# Patient Record
Sex: FEMALE | Race: WHITE | Employment: OTHER | URBAN - METROPOLITAN AREA
[De-identification: names, ages, dates, MRNs, and addresses within clinical notes are randomized per-mention and may not be internally consistent; named-entity substitution may affect disease eponyms.]

---

## 2020-01-01 ENCOUNTER — APPOINTMENT (OUTPATIENT)
Dept: CT IMAGING | Age: 85
DRG: 064 | End: 2020-01-01
Payer: MEDICARE

## 2020-01-01 ENCOUNTER — INITIAL CONSULT (OUTPATIENT)
Dept: CARDIOLOGY CLINIC | Age: 85
End: 2020-01-01
Payer: MEDICARE

## 2020-01-01 ENCOUNTER — APPOINTMENT (OUTPATIENT)
Dept: MRI IMAGING | Age: 85
DRG: 064 | End: 2020-01-01
Payer: MEDICARE

## 2020-01-01 ENCOUNTER — TELEPHONE (OUTPATIENT)
Dept: CARDIOLOGY CLINIC | Age: 85
End: 2020-01-01

## 2020-01-01 ENCOUNTER — APPOINTMENT (OUTPATIENT)
Dept: GENERAL RADIOLOGY | Age: 85
DRG: 064 | End: 2020-01-01
Payer: MEDICARE

## 2020-01-01 ENCOUNTER — HOSPITAL ENCOUNTER (INPATIENT)
Age: 85
LOS: 1 days | Discharge: HOME | DRG: 064 | End: 2020-09-22
Attending: EMERGENCY MEDICINE | Admitting: INTERNAL MEDICINE
Payer: MEDICARE

## 2020-01-01 ENCOUNTER — HOSPITAL ENCOUNTER (INPATIENT)
Age: 85
LOS: 6 days | Discharge: HOME HEALTH | DRG: 064 | End: 2020-10-30
Attending: EMERGENCY MEDICINE | Admitting: INTERNAL MEDICINE
Payer: MEDICARE

## 2020-01-01 VITALS
BODY MASS INDEX: 27.29 KG/M2 | OXYGEN SATURATION: 98 % | HEIGHT: 60 IN | WEIGHT: 139 LBS | SYSTOLIC BLOOD PRESSURE: 120 MMHG | RESPIRATION RATE: 14 BRPM | TEMPERATURE: 97.1 F | DIASTOLIC BLOOD PRESSURE: 80 MMHG | HEART RATE: 81 BPM

## 2020-01-01 VITALS
TEMPERATURE: 95.2 F | BODY MASS INDEX: 29.35 KG/M2 | RESPIRATION RATE: 19 BRPM | OXYGEN SATURATION: 97 % | HEART RATE: 93 BPM | WEIGHT: 149.5 LBS | HEIGHT: 60 IN | SYSTOLIC BLOOD PRESSURE: 118 MMHG | DIASTOLIC BLOOD PRESSURE: 97 MMHG

## 2020-01-01 VITALS
TEMPERATURE: 98.2 F | DIASTOLIC BLOOD PRESSURE: 84 MMHG | HEIGHT: 60 IN | BODY MASS INDEX: 30.26 KG/M2 | WEIGHT: 154.13 LBS | HEART RATE: 77 BPM | RESPIRATION RATE: 23 BRPM | OXYGEN SATURATION: 96 % | SYSTOLIC BLOOD PRESSURE: 167 MMHG

## 2020-01-01 LAB
ALBUMIN SERPL-MCNC: 3.5 GM/DL (ref 3.4–5)
ALBUMIN SERPL-MCNC: 3.8 GM/DL (ref 3.4–5)
ALP BLD-CCNC: 54 IU/L (ref 40–129)
ALP BLD-CCNC: 65 IU/L (ref 40–129)
ALT SERPL-CCNC: 13 U/L (ref 10–40)
ALT SERPL-CCNC: 18 U/L (ref 10–40)
ANION GAP SERPL CALCULATED.3IONS-SCNC: 10 MMOL/L (ref 4–16)
ANION GAP SERPL CALCULATED.3IONS-SCNC: 11 MMOL/L (ref 4–16)
ANION GAP SERPL CALCULATED.3IONS-SCNC: 12 MMOL/L (ref 4–16)
ANION GAP SERPL CALCULATED.3IONS-SCNC: 13 MMOL/L (ref 4–16)
ANION GAP SERPL CALCULATED.3IONS-SCNC: 13 MMOL/L (ref 4–16)
ANION GAP SERPL CALCULATED.3IONS-SCNC: 14 MMOL/L (ref 4–16)
APTT: 103.1 SECONDS (ref 25.1–37.1)
APTT: 117.2 SECONDS (ref 25.1–37.1)
APTT: 150 SECONDS (ref 25.1–37.1)
APTT: 28.6 SECONDS (ref 25.1–37.1)
APTT: 31.5 SECONDS (ref 25.1–37.1)
APTT: 34 SECONDS (ref 25.1–37.1)
APTT: 46.1 SECONDS (ref 25.1–37.1)
APTT: 50.7 SECONDS (ref 25.1–37.1)
APTT: 59.6 SECONDS (ref 25.1–37.1)
APTT: 61.6 SECONDS (ref 25.1–37.1)
APTT: 66.2 SECONDS (ref 25.1–37.1)
APTT: 72.8 SECONDS (ref 25.1–37.1)
APTT: 84.2 SECONDS (ref 25.1–37.1)
APTT: 93.3 SECONDS (ref 25.1–37.1)
AST SERPL-CCNC: 20 IU/L (ref 15–37)
AST SERPL-CCNC: 28 IU/L (ref 15–37)
BACTERIA: ABNORMAL /HPF
BACTERIA: NEGATIVE /HPF
BASOPHILS ABSOLUTE: 0.1 K/CU MM
BASOPHILS ABSOLUTE: 0.1 K/CU MM
BASOPHILS RELATIVE PERCENT: 0.6 % (ref 0–1)
BASOPHILS RELATIVE PERCENT: 0.6 % (ref 0–1)
BILIRUB SERPL-MCNC: 0.3 MG/DL (ref 0–1)
BILIRUB SERPL-MCNC: 0.5 MG/DL (ref 0–1)
BILIRUBIN URINE: NEGATIVE MG/DL
BILIRUBIN URINE: NEGATIVE MG/DL
BLOOD, URINE: ABNORMAL
BLOOD, URINE: NEGATIVE
BUN BLDV-MCNC: 10 MG/DL (ref 6–23)
BUN BLDV-MCNC: 10 MG/DL (ref 6–23)
BUN BLDV-MCNC: 14 MG/DL (ref 6–23)
BUN BLDV-MCNC: 15 MG/DL (ref 6–23)
BUN BLDV-MCNC: 16 MG/DL (ref 6–23)
BUN BLDV-MCNC: 23 MG/DL (ref 6–23)
BUN BLDV-MCNC: 24 MG/DL (ref 6–23)
BUN BLDV-MCNC: 24 MG/DL (ref 6–23)
CALCIUM SERPL-MCNC: 10 MG/DL (ref 8.3–10.6)
CALCIUM SERPL-MCNC: 8.3 MG/DL (ref 8.3–10.6)
CALCIUM SERPL-MCNC: 8.6 MG/DL (ref 8.3–10.6)
CALCIUM SERPL-MCNC: 8.7 MG/DL (ref 8.3–10.6)
CALCIUM SERPL-MCNC: 8.7 MG/DL (ref 8.3–10.6)
CALCIUM SERPL-MCNC: 9.2 MG/DL (ref 8.3–10.6)
CALCIUM SERPL-MCNC: 9.5 MG/DL (ref 8.3–10.6)
CALCIUM SERPL-MCNC: 9.9 MG/DL (ref 8.3–10.6)
CHLORIDE BLD-SCNC: 102 MMOL/L (ref 99–110)
CHLORIDE BLD-SCNC: 89 MMOL/L (ref 99–110)
CHLORIDE BLD-SCNC: 92 MMOL/L (ref 99–110)
CHLORIDE BLD-SCNC: 93 MMOL/L (ref 99–110)
CHLORIDE BLD-SCNC: 95 MMOL/L (ref 99–110)
CHLORIDE BLD-SCNC: 98 MMOL/L (ref 99–110)
CHOLESTEROL: 166 MG/DL
CHOLESTEROL: 191 MG/DL
CLARITY: ABNORMAL
CLARITY: CLEAR
CO2: 19 MMOL/L (ref 21–32)
CO2: 20 MMOL/L (ref 21–32)
CO2: 21 MMOL/L (ref 21–32)
CO2: 22 MMOL/L (ref 21–32)
COLOR: ABNORMAL
COLOR: YELLOW
CREAT SERPL-MCNC: 0.7 MG/DL (ref 0.6–1.1)
CREAT SERPL-MCNC: 0.8 MG/DL (ref 0.6–1.1)
CREAT SERPL-MCNC: 0.8 MG/DL (ref 0.6–1.1)
CREAT SERPL-MCNC: 0.9 MG/DL (ref 0.6–1.1)
CREAT SERPL-MCNC: 0.9 MG/DL (ref 0.6–1.1)
CREATININE URINE: 61.2 MG/DL
CREATININE URINE: 61.4 MG/DL (ref 28–217)
CULTURE: NORMAL
DIFFERENTIAL TYPE: ABNORMAL
DIFFERENTIAL TYPE: ABNORMAL
EKG ATRIAL RATE: 119 BPM
EKG ATRIAL RATE: 129 BPM
EKG ATRIAL RATE: 252 BPM
EKG DIAGNOSIS: NORMAL
EKG P AXIS: 0 DEGREES
EKG Q-T INTERVAL: 328 MS
EKG Q-T INTERVAL: 396 MS
EKG Q-T INTERVAL: 400 MS
EKG QRS DURATION: 82 MS
EKG QRS DURATION: 82 MS
EKG QRS DURATION: 88 MS
EKG QTC CALCULATION (BAZETT): 433 MS
EKG QTC CALCULATION (BAZETT): 472 MS
EKG QTC CALCULATION (BAZETT): 500 MS
EKG R AXIS: -48 DEGREES
EKG R AXIS: -48 DEGREES
EKG R AXIS: -52 DEGREES
EKG T AXIS: -1 DEGREES
EKG T AXIS: -12 DEGREES
EKG T AXIS: -14 DEGREES
EKG VENTRICULAR RATE: 105 BPM
EKG VENTRICULAR RATE: 84 BPM
EKG VENTRICULAR RATE: 96 BPM
EOSINOPHILS ABSOLUTE: 0.2 K/CU MM
EOSINOPHILS ABSOLUTE: 0.2 K/CU MM
EOSINOPHILS RELATIVE PERCENT: 1.8 % (ref 0–3)
EOSINOPHILS RELATIVE PERCENT: 2.6 % (ref 0–3)
ESTIMATED AVERAGE GLUCOSE: 128 MG/DL
ESTIMATED AVERAGE GLUCOSE: 131 MG/DL
FOLATE: >20 NG/ML (ref 3.1–17.5)
GFR AFRICAN AMERICAN: >60 ML/MIN/1.73M2
GFR NON-AFRICAN AMERICAN: 59 ML/MIN/1.73M2
GFR NON-AFRICAN AMERICAN: 59 ML/MIN/1.73M2
GFR NON-AFRICAN AMERICAN: >60 ML/MIN/1.73M2
GLUCOSE BLD-MCNC: 103 MG/DL (ref 70–99)
GLUCOSE BLD-MCNC: 107 MG/DL (ref 70–99)
GLUCOSE BLD-MCNC: 110 MG/DL (ref 70–99)
GLUCOSE BLD-MCNC: 112 MG/DL (ref 70–99)
GLUCOSE BLD-MCNC: 114 MG/DL (ref 70–99)
GLUCOSE BLD-MCNC: 115 MG/DL (ref 70–99)
GLUCOSE BLD-MCNC: 118 MG/DL
GLUCOSE BLD-MCNC: 118 MG/DL (ref 70–99)
GLUCOSE BLD-MCNC: 119 MG/DL
GLUCOSE BLD-MCNC: 119 MG/DL (ref 70–99)
GLUCOSE BLD-MCNC: 120 MG/DL (ref 70–99)
GLUCOSE BLD-MCNC: 92 MG/DL (ref 70–99)
GLUCOSE, URINE: NEGATIVE MG/DL
GLUCOSE, URINE: NEGATIVE MG/DL
HBA1C MFR BLD: 6.1 % (ref 4.2–6.3)
HBA1C MFR BLD: 6.2 % (ref 4.2–6.3)
HCT VFR BLD CALC: 36.6 % (ref 37–47)
HCT VFR BLD CALC: 36.7 % (ref 37–47)
HCT VFR BLD CALC: 36.9 % (ref 37–47)
HCT VFR BLD CALC: 37.5 % (ref 37–47)
HCT VFR BLD CALC: 43.4 % (ref 37–47)
HCT VFR BLD CALC: 43.8 % (ref 37–47)
HDLC SERPL-MCNC: 53 MG/DL
HDLC SERPL-MCNC: 71 MG/DL
HEMOGLOBIN: 12.2 GM/DL (ref 12.5–16)
HEMOGLOBIN: 12.6 GM/DL (ref 12.5–16)
HEMOGLOBIN: 12.9 GM/DL (ref 12.5–16)
HEMOGLOBIN: 13 GM/DL (ref 12.5–16)
HEMOGLOBIN: 14.7 GM/DL (ref 12.5–16)
HEMOGLOBIN: 14.9 GM/DL (ref 12.5–16)
HOMOCYSTEINE: 11.3 UMOL/L (ref 0–10)
HOMOCYSTEINE: 9 UMOL/L (ref 0–10)
IMMATURE NEUTROPHIL %: 0.2 % (ref 0–0.43)
IMMATURE NEUTROPHIL %: 0.2 % (ref 0–0.43)
INR BLD: 1.01 INDEX
INR BLD: 1.03 INDEX
INR BLD: 1.14 INDEX
INR BLD: 1.18 INDEX
INR BLD: 1.19 INDEX
INR BLD: 1.25 INDEX
INR BLD: 1.29 INDEX
KETONES, URINE: ABNORMAL MG/DL
KETONES, URINE: NEGATIVE MG/DL
LDL CHOLESTEROL DIRECT: 103 MG/DL
LDL CHOLESTEROL DIRECT: 108 MG/DL
LEUKOCYTE ESTERASE, URINE: ABNORMAL
LEUKOCYTE ESTERASE, URINE: NEGATIVE
LV EF: 53 %
LVEF MODALITY: NORMAL
LYMPHOCYTES ABSOLUTE: 1.2 K/CU MM
LYMPHOCYTES ABSOLUTE: 1.3 K/CU MM
LYMPHOCYTES RELATIVE PERCENT: 14 % (ref 24–44)
LYMPHOCYTES RELATIVE PERCENT: 14.9 % (ref 24–44)
Lab: NORMAL
MAGNESIUM: 1.9 MG/DL (ref 1.8–2.4)
MCH RBC QN AUTO: 29.5 PG (ref 27–31)
MCH RBC QN AUTO: 30.3 PG (ref 27–31)
MCH RBC QN AUTO: 30.7 PG (ref 27–31)
MCH RBC QN AUTO: 31 PG (ref 27–31)
MCH RBC QN AUTO: 31.1 PG (ref 27–31)
MCH RBC QN AUTO: 31.4 PG (ref 27–31)
MCHC RBC AUTO-ENTMCNC: 33.2 % (ref 32–36)
MCHC RBC AUTO-ENTMCNC: 33.9 % (ref 32–36)
MCHC RBC AUTO-ENTMCNC: 34 % (ref 32–36)
MCHC RBC AUTO-ENTMCNC: 34.4 % (ref 32–36)
MCHC RBC AUTO-ENTMCNC: 34.7 % (ref 32–36)
MCHC RBC AUTO-ENTMCNC: 35 % (ref 32–36)
MCV RBC AUTO: 88.6 FL (ref 78–100)
MCV RBC AUTO: 88.7 FL (ref 78–100)
MCV RBC AUTO: 89.2 FL (ref 78–100)
MCV RBC AUTO: 89.7 FL (ref 78–100)
MCV RBC AUTO: 90.6 FL (ref 78–100)
MCV RBC AUTO: 91.3 FL (ref 78–100)
MONOCYTES ABSOLUTE: 0.7 K/CU MM
MONOCYTES ABSOLUTE: 0.9 K/CU MM
MONOCYTES RELATIVE PERCENT: 10.1 % (ref 0–4)
MONOCYTES RELATIVE PERCENT: 8.3 % (ref 0–4)
MUCUS: ABNORMAL HPF
NITRITE URINE, QUANTITATIVE: NEGATIVE
NITRITE URINE, QUANTITATIVE: NEGATIVE
NUCLEATED RBC %: 0 %
NUCLEATED RBC %: 0 %
OSMOLALITY URINE: 455 MOS/L (ref 292–1090)
PDW BLD-RTO: 13 % (ref 11.7–14.9)
PDW BLD-RTO: 13.1 % (ref 11.7–14.9)
PDW BLD-RTO: 13.2 % (ref 11.7–14.9)
PDW BLD-RTO: 13.3 % (ref 11.7–14.9)
PDW BLD-RTO: 13.3 % (ref 11.7–14.9)
PDW BLD-RTO: 13.4 % (ref 11.7–14.9)
PH, URINE: 6 (ref 5–8)
PH, URINE: 7 (ref 5–8)
PHOSPHORUS: 3.1 MG/DL (ref 2.5–4.9)
PLATELET # BLD: 279 K/CU MM (ref 140–440)
PLATELET # BLD: 287 K/CU MM (ref 140–440)
PLATELET # BLD: 290 K/CU MM (ref 140–440)
PLATELET # BLD: 312 K/CU MM (ref 140–440)
PLATELET # BLD: 321 K/CU MM (ref 140–440)
PLATELET # BLD: 345 K/CU MM (ref 140–440)
PMV BLD AUTO: 10.1 FL (ref 7.5–11.1)
PMV BLD AUTO: 10.6 FL (ref 7.5–11.1)
PMV BLD AUTO: 9.3 FL (ref 7.5–11.1)
PMV BLD AUTO: 9.4 FL (ref 7.5–11.1)
PMV BLD AUTO: 9.7 FL (ref 7.5–11.1)
PMV BLD AUTO: 9.8 FL (ref 7.5–11.1)
POTASSIUM SERPL-SCNC: 3.7 MMOL/L (ref 3.5–5.1)
POTASSIUM SERPL-SCNC: 3.9 MMOL/L (ref 3.5–5.1)
POTASSIUM SERPL-SCNC: 4.2 MMOL/L (ref 3.5–5.1)
POTASSIUM SERPL-SCNC: 4.3 MMOL/L (ref 3.5–5.1)
POTASSIUM SERPL-SCNC: 4.4 MMOL/L (ref 3.5–5.1)
POTASSIUM SERPL-SCNC: 4.4 MMOL/L (ref 3.5–5.1)
POTASSIUM SERPL-SCNC: 4.5 MMOL/L (ref 3.5–5.1)
POTASSIUM SERPL-SCNC: 4.7 MMOL/L (ref 3.5–5.1)
PROT/CREAT RATIO, UR: 0.2
PROTEIN UA: 100 MG/DL
PROTEIN UA: NEGATIVE MG/DL
PROTHROMBIN TIME: 12.2 SECONDS (ref 11.7–14.5)
PROTHROMBIN TIME: 12.5 SECONDS (ref 11.7–14.5)
PROTHROMBIN TIME: 13.8 SECONDS (ref 11.7–14.5)
PROTHROMBIN TIME: 14.3 SECONDS (ref 11.7–14.5)
PROTHROMBIN TIME: 14.4 SECONDS (ref 11.7–14.5)
PROTHROMBIN TIME: 15.1 SECONDS (ref 11.7–14.5)
PROTHROMBIN TIME: 15.7 SECONDS (ref 11.7–14.5)
RBC # BLD: 4.01 M/CU MM (ref 4.2–5.4)
RBC # BLD: 4.14 M/CU MM (ref 4.2–5.4)
RBC # BLD: 4.16 M/CU MM (ref 4.2–5.4)
RBC # BLD: 4.18 M/CU MM (ref 4.2–5.4)
RBC # BLD: 4.79 M/CU MM (ref 4.2–5.4)
RBC # BLD: 4.91 M/CU MM (ref 4.2–5.4)
RBC URINE: 1 /HPF (ref 0–6)
RBC URINE: 17 /HPF (ref 0–6)
REASON FOR REJECTION: NORMAL
REJECTED TEST: NORMAL
SEGMENTED NEUTROPHILS ABSOLUTE COUNT: 6 K/CU MM
SEGMENTED NEUTROPHILS ABSOLUTE COUNT: 6.4 K/CU MM
SEGMENTED NEUTROPHILS RELATIVE PERCENT: 71.6 % (ref 36–66)
SEGMENTED NEUTROPHILS RELATIVE PERCENT: 75.1 % (ref 36–66)
SODIUM BLD-SCNC: 122 MMOL/L (ref 135–145)
SODIUM BLD-SCNC: 127 MMOL/L (ref 135–145)
SODIUM BLD-SCNC: 128 MMOL/L (ref 135–145)
SODIUM BLD-SCNC: 128 MMOL/L (ref 135–145)
SODIUM BLD-SCNC: 130 MMOL/L (ref 135–145)
SODIUM BLD-SCNC: 133 MMOL/L (ref 135–145)
SODIUM URINE: 63 MMOL/L (ref 35–167)
SPECIFIC GRAVITY UA: 1.03 (ref 1–1.03)
SPECIFIC GRAVITY UA: 1.03 (ref 1–1.03)
SPECIMEN: NORMAL
SQUAMOUS EPITHELIAL: <1 /HPF
SQUAMOUS EPITHELIAL: <1 /HPF
TOTAL IMMATURE NEUTOROPHIL: 0.02 K/CU MM
TOTAL IMMATURE NEUTOROPHIL: 0.02 K/CU MM
TOTAL NUCLEATED RBC: 0 K/CU MM
TOTAL NUCLEATED RBC: 0 K/CU MM
TOTAL PROTEIN: 6.7 GM/DL (ref 6.4–8.2)
TOTAL PROTEIN: 7.5 GM/DL (ref 6.4–8.2)
TOTAL RETICULOCYTE COUNT: 0.07 K/CU MM
TRICHOMONAS: ABNORMAL /HPF
TRICHOMONAS: ABNORMAL /HPF
TRIGL SERPL-MCNC: 62 MG/DL
TRIGL SERPL-MCNC: 78 MG/DL
TROPONIN T: 0.01 NG/ML
TROPONIN T: 0.03 NG/ML
TROPONIN T: 0.04 NG/ML
TROPONIN T: <0.01 NG/ML
TSH HIGH SENSITIVITY: 1.26 UIU/ML (ref 0.27–4.2)
URINE TOTAL PROTEIN: 11.3 MG/DL
UROBILINOGEN, URINE: NORMAL MG/DL (ref 0.2–1)
UROBILINOGEN, URINE: NORMAL MG/DL (ref 0.2–1)
VITAMIN B-12: 1282 PG/ML (ref 211–911)
WBC # BLD: 6.5 K/CU MM (ref 4–10.5)
WBC # BLD: 6.9 K/CU MM (ref 4–10.5)
WBC # BLD: 7 K/CU MM (ref 4–10.5)
WBC # BLD: 7.2 K/CU MM (ref 4–10.5)
WBC # BLD: 8.4 K/CU MM (ref 4–10.5)
WBC # BLD: 8.5 K/CU MM (ref 4–10.5)
WBC UA: 5 /HPF (ref 0–5)
WBC UA: ABNORMAL /HPF (ref 0–5)

## 2020-01-01 PROCEDURE — 94761 N-INVAS EAR/PLS OXIMETRY MLT: CPT

## 2020-01-01 PROCEDURE — 93000 ELECTROCARDIOGRAM COMPLETE: CPT | Performed by: INTERNAL MEDICINE

## 2020-01-01 PROCEDURE — 93010 ELECTROCARDIOGRAM REPORT: CPT | Performed by: INTERNAL MEDICINE

## 2020-01-01 PROCEDURE — 85027 COMPLETE CBC AUTOMATED: CPT

## 2020-01-01 PROCEDURE — 6370000000 HC RX 637 (ALT 250 FOR IP): Performed by: PSYCHIATRY & NEUROLOGY

## 2020-01-01 PROCEDURE — 83090 ASSAY OF HOMOCYSTEINE: CPT

## 2020-01-01 PROCEDURE — 2580000003 HC RX 258: Performed by: NURSE PRACTITIONER

## 2020-01-01 PROCEDURE — 97116 GAIT TRAINING THERAPY: CPT

## 2020-01-01 PROCEDURE — 99232 SBSQ HOSP IP/OBS MODERATE 35: CPT | Performed by: INTERNAL MEDICINE

## 2020-01-01 PROCEDURE — 85730 THROMBOPLASTIN TIME PARTIAL: CPT

## 2020-01-01 PROCEDURE — 6360000002 HC RX W HCPCS: Performed by: NURSE PRACTITIONER

## 2020-01-01 PROCEDURE — 6370000000 HC RX 637 (ALT 250 FOR IP): Performed by: NURSE PRACTITIONER

## 2020-01-01 PROCEDURE — 82962 GLUCOSE BLOOD TEST: CPT

## 2020-01-01 PROCEDURE — 6370000000 HC RX 637 (ALT 250 FOR IP): Performed by: INTERNAL MEDICINE

## 2020-01-01 PROCEDURE — 36415 COLL VENOUS BLD VENIPUNCTURE: CPT

## 2020-01-01 PROCEDURE — 4040F PNEUMOC VAC/ADMIN/RCVD: CPT | Performed by: INTERNAL MEDICINE

## 2020-01-01 PROCEDURE — 96374 THER/PROPH/DIAG INJ IV PUSH: CPT

## 2020-01-01 PROCEDURE — 85610 PROTHROMBIN TIME: CPT

## 2020-01-01 PROCEDURE — 96372 THER/PROPH/DIAG INJ SC/IM: CPT

## 2020-01-01 PROCEDURE — 83721 ASSAY OF BLOOD LIPOPROTEIN: CPT

## 2020-01-01 PROCEDURE — 84156 ASSAY OF PROTEIN URINE: CPT

## 2020-01-01 PROCEDURE — G0378 HOSPITAL OBSERVATION PER HR: HCPCS

## 2020-01-01 PROCEDURE — 1036F TOBACCO NON-USER: CPT | Performed by: INTERNAL MEDICINE

## 2020-01-01 PROCEDURE — 6360000002 HC RX W HCPCS: Performed by: INTERNAL MEDICINE

## 2020-01-01 PROCEDURE — 2700000000 HC OXYGEN THERAPY PER DAY

## 2020-01-01 PROCEDURE — 97162 PT EVAL MOD COMPLEX 30 MIN: CPT

## 2020-01-01 PROCEDURE — 97110 THERAPEUTIC EXERCISES: CPT

## 2020-01-01 PROCEDURE — 6360000002 HC RX W HCPCS: Performed by: PSYCHIATRY & NEUROLOGY

## 2020-01-01 PROCEDURE — 97530 THERAPEUTIC ACTIVITIES: CPT

## 2020-01-01 PROCEDURE — 70450 CT HEAD/BRAIN W/O DYE: CPT

## 2020-01-01 PROCEDURE — 80061 LIPID PANEL: CPT

## 2020-01-01 PROCEDURE — 2140000000 HC CCU INTERMEDIATE R&B

## 2020-01-01 PROCEDURE — 70496 CT ANGIOGRAPHY HEAD: CPT

## 2020-01-01 PROCEDURE — G8417 CALC BMI ABV UP PARAM F/U: HCPCS | Performed by: INTERNAL MEDICINE

## 2020-01-01 PROCEDURE — 82607 VITAMIN B-12: CPT

## 2020-01-01 PROCEDURE — 84100 ASSAY OF PHOSPHORUS: CPT

## 2020-01-01 PROCEDURE — 97166 OT EVAL MOD COMPLEX 45 MIN: CPT

## 2020-01-01 PROCEDURE — 83935 ASSAY OF URINE OSMOLALITY: CPT

## 2020-01-01 PROCEDURE — 6370000000 HC RX 637 (ALT 250 FOR IP): Performed by: HOSPITALIST

## 2020-01-01 PROCEDURE — 71045 X-RAY EXAM CHEST 1 VIEW: CPT

## 2020-01-01 PROCEDURE — 2000000000 HC ICU R&B

## 2020-01-01 PROCEDURE — 99205 OFFICE O/P NEW HI 60 MIN: CPT | Performed by: INTERNAL MEDICINE

## 2020-01-01 PROCEDURE — 83735 ASSAY OF MAGNESIUM: CPT

## 2020-01-01 PROCEDURE — 84484 ASSAY OF TROPONIN QUANT: CPT

## 2020-01-01 PROCEDURE — 82746 ASSAY OF FOLIC ACID SERUM: CPT

## 2020-01-01 PROCEDURE — 83036 HEMOGLOBIN GLYCOSYLATED A1C: CPT

## 2020-01-01 PROCEDURE — 6360000004 HC RX CONTRAST MEDICATION: Performed by: EMERGENCY MEDICINE

## 2020-01-01 PROCEDURE — 1123F ACP DISCUSS/DSCN MKR DOCD: CPT | Performed by: INTERNAL MEDICINE

## 2020-01-01 PROCEDURE — 85025 COMPLETE CBC W/AUTO DIFF WBC: CPT

## 2020-01-01 PROCEDURE — 80048 BASIC METABOLIC PNL TOTAL CA: CPT

## 2020-01-01 PROCEDURE — 82570 ASSAY OF URINE CREATININE: CPT

## 2020-01-01 PROCEDURE — 80053 COMPREHEN METABOLIC PANEL: CPT

## 2020-01-01 PROCEDURE — 84300 ASSAY OF URINE SODIUM: CPT

## 2020-01-01 PROCEDURE — 2500000003 HC RX 250 WO HCPCS: Performed by: INTERNAL MEDICINE

## 2020-01-01 PROCEDURE — 99285 EMERGENCY DEPT VISIT HI MDM: CPT

## 2020-01-01 PROCEDURE — 70551 MRI BRAIN STEM W/O DYE: CPT

## 2020-01-01 PROCEDURE — 70547 MR ANGIOGRAPHY NECK W/O DYE: CPT

## 2020-01-01 PROCEDURE — 1200000000 HC SEMI PRIVATE

## 2020-01-01 PROCEDURE — 93005 ELECTROCARDIOGRAM TRACING: CPT | Performed by: EMERGENCY MEDICINE

## 2020-01-01 PROCEDURE — 99291 CRITICAL CARE FIRST HOUR: CPT | Performed by: INTERNAL MEDICINE

## 2020-01-01 PROCEDURE — 92610 EVALUATE SWALLOWING FUNCTION: CPT | Performed by: SPEECH-LANGUAGE PATHOLOGIST

## 2020-01-01 PROCEDURE — 6360000004 HC RX CONTRAST MEDICATION: Performed by: PHYSICIAN ASSISTANT

## 2020-01-01 PROCEDURE — 6360000002 HC RX W HCPCS: Performed by: PHYSICIAN ASSISTANT

## 2020-01-01 PROCEDURE — G8484 FLU IMMUNIZE NO ADMIN: HCPCS | Performed by: INTERNAL MEDICINE

## 2020-01-01 PROCEDURE — 87086 URINE CULTURE/COLONY COUNT: CPT

## 2020-01-01 PROCEDURE — 2580000003 HC RX 258: Performed by: PHYSICIAN ASSISTANT

## 2020-01-01 PROCEDURE — 93005 ELECTROCARDIOGRAM TRACING: CPT | Performed by: PHYSICIAN ASSISTANT

## 2020-01-01 PROCEDURE — 96375 TX/PRO/DX INJ NEW DRUG ADDON: CPT

## 2020-01-01 PROCEDURE — 81001 URINALYSIS AUTO W/SCOPE: CPT

## 2020-01-01 PROCEDURE — 51798 US URINE CAPACITY MEASURE: CPT

## 2020-01-01 PROCEDURE — 97535 SELF CARE MNGMENT TRAINING: CPT

## 2020-01-01 PROCEDURE — G8427 DOCREV CUR MEDS BY ELIG CLIN: HCPCS | Performed by: INTERNAL MEDICINE

## 2020-01-01 PROCEDURE — 93306 TTE W/DOPPLER COMPLETE: CPT

## 2020-01-01 PROCEDURE — 93005 ELECTROCARDIOGRAM TRACING: CPT | Performed by: NURSE PRACTITIONER

## 2020-01-01 PROCEDURE — 1090F PRES/ABSN URINE INCON ASSESS: CPT | Performed by: INTERNAL MEDICINE

## 2020-01-01 PROCEDURE — APPSS30 APP SPLIT SHARED TIME 16-30 MINUTES: Performed by: NURSE PRACTITIONER

## 2020-01-01 PROCEDURE — 84443 ASSAY THYROID STIM HORMONE: CPT

## 2020-01-01 PROCEDURE — 70498 CT ANGIOGRAPHY NECK: CPT

## 2020-01-01 RX ORDER — CLOPIDOGREL BISULFATE 75 MG/1
75 TABLET ORAL DAILY
Status: DISCONTINUED | OUTPATIENT
Start: 2020-01-01 | End: 2020-01-01 | Stop reason: HOSPADM

## 2020-01-01 RX ORDER — LEVOTHYROXINE SODIUM 112 UG/1
112 TABLET ORAL DAILY
Status: DISCONTINUED | OUTPATIENT
Start: 2020-01-01 | End: 2020-01-01 | Stop reason: HOSPADM

## 2020-01-01 RX ORDER — ASPIRIN 81 MG/1
81 TABLET, CHEWABLE ORAL DAILY
Status: DISCONTINUED | OUTPATIENT
Start: 2020-01-01 | End: 2020-01-01 | Stop reason: HOSPADM

## 2020-01-01 RX ORDER — ASPIRIN 300 MG/1
300 SUPPOSITORY RECTAL DAILY
Status: DISCONTINUED | OUTPATIENT
Start: 2020-01-01 | End: 2020-01-01

## 2020-01-01 RX ORDER — ASPIRIN 81 MG/1
81 TABLET ORAL DAILY
Qty: 30 TABLET | Refills: 2 | Status: SHIPPED | OUTPATIENT
Start: 2020-01-01 | End: 2020-01-01

## 2020-01-01 RX ORDER — AMLODIPINE BESYLATE 2.5 MG/1
2.5 TABLET ORAL DAILY
Status: DISCONTINUED | OUTPATIENT
Start: 2020-01-01 | End: 2020-01-01

## 2020-01-01 RX ORDER — DEXTROSE MONOHYDRATE 25 G/50ML
12.5 INJECTION, SOLUTION INTRAVENOUS
Status: DISCONTINUED | OUTPATIENT
Start: 2020-01-01 | End: 2020-01-01

## 2020-01-01 RX ORDER — WARFARIN SODIUM 5 MG/1
5 TABLET ORAL DAILY
Status: DISCONTINUED | OUTPATIENT
Start: 2020-01-01 | End: 2020-01-01 | Stop reason: HOSPADM

## 2020-01-01 RX ORDER — CLOPIDOGREL BISULFATE 75 MG/1
75 TABLET ORAL DAILY
Qty: 30 TABLET | Refills: 3 | Status: SHIPPED | OUTPATIENT
Start: 2020-01-01 | End: 2020-01-01 | Stop reason: ALTCHOICE

## 2020-01-01 RX ORDER — ATORVASTATIN CALCIUM 80 MG/1
80 TABLET, FILM COATED ORAL NIGHTLY
Status: DISCONTINUED | OUTPATIENT
Start: 2020-01-01 | End: 2020-01-01 | Stop reason: HOSPADM

## 2020-01-01 RX ORDER — 0.9 % SODIUM CHLORIDE 0.9 %
50 INTRAVENOUS SOLUTION INTRAVENOUS ONCE
Status: DISCONTINUED | OUTPATIENT
Start: 2020-01-01 | End: 2020-01-01

## 2020-01-01 RX ORDER — PROMETHAZINE HYDROCHLORIDE 25 MG/1
12.5 TABLET ORAL EVERY 6 HOURS PRN
Status: DISCONTINUED | OUTPATIENT
Start: 2020-01-01 | End: 2020-01-01 | Stop reason: HOSPADM

## 2020-01-01 RX ORDER — CLOPIDOGREL BISULFATE 75 MG/1
75 TABLET ORAL DAILY
Status: DISCONTINUED | OUTPATIENT
Start: 2020-01-01 | End: 2020-01-01

## 2020-01-01 RX ORDER — DOCUSATE SODIUM 100 MG/1
100 CAPSULE, LIQUID FILLED ORAL 2 TIMES DAILY
Status: DISCONTINUED | OUTPATIENT
Start: 2020-01-01 | End: 2020-01-01 | Stop reason: HOSPADM

## 2020-01-01 RX ORDER — CLONIDINE HYDROCHLORIDE 0.1 MG/1
0.1 TABLET ORAL 3 TIMES DAILY PRN
Status: DISCONTINUED | OUTPATIENT
Start: 2020-01-01 | End: 2020-01-01 | Stop reason: HOSPADM

## 2020-01-01 RX ORDER — SODIUM CHLORIDE 9 MG/ML
INJECTION, SOLUTION INTRAVENOUS CONTINUOUS
Status: DISCONTINUED | OUTPATIENT
Start: 2020-01-01 | End: 2020-01-01 | Stop reason: HOSPADM

## 2020-01-01 RX ORDER — LABETALOL HYDROCHLORIDE 5 MG/ML
10 INJECTION, SOLUTION INTRAVENOUS EVERY 10 MIN PRN
Status: DISCONTINUED | OUTPATIENT
Start: 2020-01-01 | End: 2020-01-01

## 2020-01-01 RX ORDER — LACTULOSE 10 G/15ML
20 SOLUTION ORAL 3 TIMES DAILY
Status: DISCONTINUED | OUTPATIENT
Start: 2020-01-01 | End: 2020-01-01 | Stop reason: HOSPADM

## 2020-01-01 RX ORDER — SODIUM CHLORIDE 0.9 % (FLUSH) 0.9 %
10 SYRINGE (ML) INJECTION 2 TIMES DAILY
Status: DISCONTINUED | OUTPATIENT
Start: 2020-01-01 | End: 2020-01-01

## 2020-01-01 RX ORDER — WARFARIN SODIUM 4 MG/1
4 TABLET ORAL EVERY EVENING
Qty: 30 TABLET | Refills: 0 | Status: SHIPPED | OUTPATIENT
Start: 2020-01-01

## 2020-01-01 RX ORDER — METOPROLOL TARTRATE 5 MG/5ML
5 INJECTION INTRAVENOUS EVERY 6 HOURS
Status: DISCONTINUED | OUTPATIENT
Start: 2020-01-01 | End: 2020-01-01

## 2020-01-01 RX ORDER — SODIUM CHLORIDE 0.9 % (FLUSH) 0.9 %
10 SYRINGE (ML) INJECTION PRN
Status: DISCONTINUED | OUTPATIENT
Start: 2020-01-01 | End: 2020-01-01

## 2020-01-01 RX ORDER — ONDANSETRON 2 MG/ML
4 INJECTION INTRAMUSCULAR; INTRAVENOUS ONCE
Status: COMPLETED | OUTPATIENT
Start: 2020-01-01 | End: 2020-01-01

## 2020-01-01 RX ORDER — METOPROLOL TARTRATE 5 MG/5ML
5 INJECTION INTRAVENOUS EVERY 6 HOURS PRN
Status: DISCONTINUED | OUTPATIENT
Start: 2020-01-01 | End: 2020-01-01 | Stop reason: HOSPADM

## 2020-01-01 RX ORDER — WARFARIN SODIUM 5 MG/1
2.5 TABLET ORAL DAILY
Status: DISCONTINUED | OUTPATIENT
Start: 2020-01-01 | End: 2020-01-01

## 2020-01-01 RX ORDER — ONDANSETRON 2 MG/ML
4 INJECTION INTRAMUSCULAR; INTRAVENOUS EVERY 6 HOURS PRN
Status: DISCONTINUED | OUTPATIENT
Start: 2020-01-01 | End: 2020-01-01 | Stop reason: HOSPADM

## 2020-01-01 RX ORDER — SODIUM CHLORIDE 0.9 % (FLUSH) 0.9 %
10 SYRINGE (ML) INJECTION EVERY 12 HOURS SCHEDULED
Status: DISCONTINUED | OUTPATIENT
Start: 2020-01-01 | End: 2020-01-01 | Stop reason: HOSPADM

## 2020-01-01 RX ORDER — LABETALOL HYDROCHLORIDE 5 MG/ML
10 INJECTION, SOLUTION INTRAVENOUS EVERY 10 MIN PRN
Status: DISCONTINUED | OUTPATIENT
Start: 2020-01-01 | End: 2020-01-01 | Stop reason: SDUPTHER

## 2020-01-01 RX ORDER — ASPIRIN 81 MG/1
81 TABLET ORAL DAILY
Status: DISCONTINUED | OUTPATIENT
Start: 2020-01-01 | End: 2020-01-01

## 2020-01-01 RX ORDER — AMLODIPINE BESYLATE 5 MG/1
5 TABLET ORAL DAILY
Status: DISCONTINUED | OUTPATIENT
Start: 2020-01-01 | End: 2020-01-01 | Stop reason: HOSPADM

## 2020-01-01 RX ORDER — HEPARIN SODIUM 10000 [USP'U]/100ML
12 INJECTION, SOLUTION INTRAVENOUS CONTINUOUS
Status: DISCONTINUED | OUTPATIENT
Start: 2020-01-01 | End: 2020-01-01

## 2020-01-01 RX ORDER — HEPARIN SODIUM 1000 [USP'U]/ML
60 INJECTION, SOLUTION INTRAVENOUS; SUBCUTANEOUS ONCE
Status: DISCONTINUED | OUTPATIENT
Start: 2020-01-01 | End: 2020-01-01

## 2020-01-01 RX ORDER — ASPIRIN 81 MG/1
81 TABLET ORAL DAILY
Status: ON HOLD | COMMUNITY
End: 2020-01-01 | Stop reason: HOSPADM

## 2020-01-01 RX ORDER — POLYETHYLENE GLYCOL 3350 17 G/17G
17 POWDER, FOR SOLUTION ORAL DAILY PRN
Status: DISCONTINUED | OUTPATIENT
Start: 2020-01-01 | End: 2020-01-01 | Stop reason: HOSPADM

## 2020-01-01 RX ORDER — SODIUM CHLORIDE 0.9 % (FLUSH) 0.9 %
10 SYRINGE (ML) INJECTION PRN
Status: DISCONTINUED | OUTPATIENT
Start: 2020-01-01 | End: 2020-01-01 | Stop reason: HOSPADM

## 2020-01-01 RX ORDER — SODIUM CHLORIDE 9 MG/ML
INJECTION, SOLUTION INTRAVENOUS CONTINUOUS
Status: DISCONTINUED | OUTPATIENT
Start: 2020-01-01 | End: 2020-01-01

## 2020-01-01 RX ORDER — METOPROLOL TARTRATE 50 MG/1
50 TABLET, FILM COATED ORAL 2 TIMES DAILY
Qty: 60 TABLET | Refills: 3 | Status: SHIPPED | OUTPATIENT
Start: 2020-01-01

## 2020-01-01 RX ORDER — HEPARIN SODIUM 1000 [USP'U]/ML
30 INJECTION, SOLUTION INTRAVENOUS; SUBCUTANEOUS PRN
Status: DISCONTINUED | OUTPATIENT
Start: 2020-01-01 | End: 2020-01-01

## 2020-01-01 RX ORDER — SODIUM CHLORIDE 0.9 % (FLUSH) 0.9 %
10 SYRINGE (ML) INJECTION EVERY 12 HOURS SCHEDULED
Status: DISCONTINUED | OUTPATIENT
Start: 2020-01-01 | End: 2020-01-01

## 2020-01-01 RX ORDER — ATORVASTATIN CALCIUM 80 MG/1
80 TABLET, FILM COATED ORAL NIGHTLY
Qty: 30 TABLET | Refills: 3 | Status: SHIPPED | OUTPATIENT
Start: 2020-01-01 | End: 2020-01-01

## 2020-01-01 RX ORDER — LACTULOSE 10 G/15ML
10 SOLUTION ORAL 2 TIMES DAILY PRN
Qty: 2 BOTTLE | Refills: 0 | Status: SHIPPED | OUTPATIENT
Start: 2020-01-01 | End: 2020-01-01

## 2020-01-01 RX ORDER — LABETALOL HYDROCHLORIDE 5 MG/ML
10 INJECTION, SOLUTION INTRAVENOUS EVERY 10 MIN PRN
Status: DISCONTINUED | OUTPATIENT
Start: 2020-01-01 | End: 2020-01-01 | Stop reason: HOSPADM

## 2020-01-01 RX ORDER — HEPARIN SODIUM 1000 [USP'U]/ML
60 INJECTION, SOLUTION INTRAVENOUS; SUBCUTANEOUS PRN
Status: DISCONTINUED | OUTPATIENT
Start: 2020-01-01 | End: 2020-01-01

## 2020-01-01 RX ORDER — SODIUM CHLORIDE 9 MG/ML
INJECTION, SOLUTION INTRAVENOUS CONTINUOUS
Status: ACTIVE | OUTPATIENT
Start: 2020-01-01 | End: 2020-01-01

## 2020-01-01 RX ORDER — ACETAMINOPHEN 325 MG/1
650 TABLET ORAL EVERY 4 HOURS PRN
Status: DISCONTINUED | OUTPATIENT
Start: 2020-01-01 | End: 2020-01-01 | Stop reason: HOSPADM

## 2020-01-01 RX ORDER — DIGOXIN 125 MCG
125 TABLET ORAL DAILY
COMMUNITY
Start: 2020-01-01

## 2020-01-01 RX ORDER — LORAZEPAM 2 MG/ML
1 INJECTION INTRAMUSCULAR ONCE
Status: COMPLETED | OUTPATIENT
Start: 2020-01-01 | End: 2020-01-01

## 2020-01-01 RX ADMIN — ATORVASTATIN CALCIUM 80 MG: 80 TABLET, FILM COATED ORAL at 20:42

## 2020-01-01 RX ADMIN — ONDANSETRON 4 MG: 2 INJECTION INTRAMUSCULAR; INTRAVENOUS at 21:17

## 2020-01-01 RX ADMIN — SODIUM CHLORIDE, PRESERVATIVE FREE 10 ML: 5 INJECTION INTRAVENOUS at 21:17

## 2020-01-01 RX ADMIN — ASPIRIN 81 MG CHEWABLE TABLET 81 MG: 81 TABLET CHEWABLE at 09:56

## 2020-01-01 RX ADMIN — SODIUM CHLORIDE, PRESERVATIVE FREE 10 ML: 5 INJECTION INTRAVENOUS at 20:06

## 2020-01-01 RX ADMIN — DOCUSATE SODIUM 100 MG: 100 CAPSULE, LIQUID FILLED ORAL at 20:42

## 2020-01-01 RX ADMIN — METOPROLOL TARTRATE 25 MG: 25 TABLET, FILM COATED ORAL at 21:18

## 2020-01-01 RX ADMIN — LEVOTHYROXINE SODIUM 112 MCG: 112 TABLET ORAL at 05:45

## 2020-01-01 RX ADMIN — ASPIRIN 81 MG CHEWABLE TABLET 81 MG: 81 TABLET CHEWABLE at 08:43

## 2020-01-01 RX ADMIN — ASPIRIN 81 MG CHEWABLE TABLET 81 MG: 81 TABLET CHEWABLE at 08:26

## 2020-01-01 RX ADMIN — DOCUSATE SODIUM 100 MG: 100 CAPSULE, LIQUID FILLED ORAL at 10:27

## 2020-01-01 RX ADMIN — LEVOTHYROXINE SODIUM 112 MCG: 112 TABLET ORAL at 09:09

## 2020-01-01 RX ADMIN — METOPROLOL TARTRATE 25 MG: 25 TABLET, FILM COATED ORAL at 08:34

## 2020-01-01 RX ADMIN — ENOXAPARIN SODIUM 40 MG: 40 INJECTION SUBCUTANEOUS at 21:48

## 2020-01-01 RX ADMIN — WARFARIN SODIUM 5 MG: 5 TABLET ORAL at 16:00

## 2020-01-01 RX ADMIN — ENOXAPARIN SODIUM 40 MG: 40 INJECTION SUBCUTANEOUS at 08:42

## 2020-01-01 RX ADMIN — CLOPIDOGREL BISULFATE 75 MG: 75 TABLET ORAL at 08:43

## 2020-01-01 RX ADMIN — WARFARIN SODIUM 2.5 MG: 5 TABLET ORAL at 18:28

## 2020-01-01 RX ADMIN — WARFARIN SODIUM 5 MG: 5 TABLET ORAL at 18:15

## 2020-01-01 RX ADMIN — SODIUM CHLORIDE: 9 INJECTION, SOLUTION INTRAVENOUS at 00:13

## 2020-01-01 RX ADMIN — IOPAMIDOL 75 ML: 755 INJECTION, SOLUTION INTRAVENOUS at 19:49

## 2020-01-01 RX ADMIN — DOCUSATE SODIUM 100 MG: 100 CAPSULE, LIQUID FILLED ORAL at 09:10

## 2020-01-01 RX ADMIN — SODIUM CHLORIDE: 9 INJECTION, SOLUTION INTRAVENOUS at 20:39

## 2020-01-01 RX ADMIN — METOPROLOL TARTRATE 25 MG: 25 TABLET, FILM COATED ORAL at 21:21

## 2020-01-01 RX ADMIN — ASPIRIN 325 MG: 325 TABLET, COATED ORAL at 08:40

## 2020-01-01 RX ADMIN — LACTULOSE 20 G: 10 SOLUTION ORAL at 16:39

## 2020-01-01 RX ADMIN — SODIUM CHLORIDE, PRESERVATIVE FREE 10 ML: 5 INJECTION INTRAVENOUS at 11:50

## 2020-01-01 RX ADMIN — SODIUM CHLORIDE, PRESERVATIVE FREE 10 ML: 5 INJECTION INTRAVENOUS at 21:21

## 2020-01-01 RX ADMIN — ENOXAPARIN SODIUM 40 MG: 40 INJECTION SUBCUTANEOUS at 18:25

## 2020-01-01 RX ADMIN — SODIUM CHLORIDE: 9 INJECTION, SOLUTION INTRAVENOUS at 03:50

## 2020-01-01 RX ADMIN — HEPARIN SODIUM AND DEXTROSE 16 UNITS/KG/HR: 10000; 5 INJECTION INTRAVENOUS at 15:45

## 2020-01-01 RX ADMIN — ASPIRIN 81 MG CHEWABLE TABLET 81 MG: 81 TABLET CHEWABLE at 09:09

## 2020-01-01 RX ADMIN — SODIUM CHLORIDE, PRESERVATIVE FREE 10 ML: 5 INJECTION INTRAVENOUS at 08:45

## 2020-01-01 RX ADMIN — DOCUSATE SODIUM 100 MG: 100 CAPSULE, LIQUID FILLED ORAL at 09:02

## 2020-01-01 RX ADMIN — METOPROLOL TARTRATE 25 MG: 25 TABLET, FILM COATED ORAL at 08:26

## 2020-01-01 RX ADMIN — SODIUM CHLORIDE, PRESERVATIVE FREE 10 ML: 5 INJECTION INTRAVENOUS at 08:40

## 2020-01-01 RX ADMIN — ASPIRIN 81 MG CHEWABLE TABLET 81 MG: 81 TABLET CHEWABLE at 08:34

## 2020-01-01 RX ADMIN — HEPARIN SODIUM AND DEXTROSE 11.01 UNITS/KG/HR: 10000; 5 INJECTION INTRAVENOUS at 05:47

## 2020-01-01 RX ADMIN — METOPROLOL TARTRATE 5 MG: 5 INJECTION INTRAVENOUS at 11:49

## 2020-01-01 RX ADMIN — ASPIRIN 81 MG CHEWABLE TABLET 81 MG: 81 TABLET CHEWABLE at 09:01

## 2020-01-01 RX ADMIN — CLOPIDOGREL BISULFATE 75 MG: 75 TABLET ORAL at 15:51

## 2020-01-01 RX ADMIN — LEVOTHYROXINE SODIUM 112 MCG: 112 TABLET ORAL at 08:44

## 2020-01-01 RX ADMIN — LORAZEPAM 1 MG: 2 INJECTION INTRAMUSCULAR; INTRAVENOUS at 19:08

## 2020-01-01 RX ADMIN — LEVOTHYROXINE SODIUM 112 MCG: 112 TABLET ORAL at 06:29

## 2020-01-01 RX ADMIN — DOCUSATE SODIUM 100 MG: 100 CAPSULE, LIQUID FILLED ORAL at 21:21

## 2020-01-01 RX ADMIN — AMLODIPINE BESYLATE 2.5 MG: 2.5 TABLET ORAL at 08:40

## 2020-01-01 RX ADMIN — LEVOTHYROXINE SODIUM 112 MCG: 112 TABLET ORAL at 06:46

## 2020-01-01 RX ADMIN — DOCUSATE SODIUM 100 MG: 100 CAPSULE, LIQUID FILLED ORAL at 08:26

## 2020-01-01 RX ADMIN — ONDANSETRON 4 MG: 2 INJECTION INTRAMUSCULAR; INTRAVENOUS at 14:15

## 2020-01-01 RX ADMIN — WARFARIN SODIUM 2.5 MG: 5 TABLET ORAL at 16:59

## 2020-01-01 RX ADMIN — SODIUM CHLORIDE, PRESERVATIVE FREE 10 ML: 5 INJECTION INTRAVENOUS at 09:00

## 2020-01-01 RX ADMIN — LEVOTHYROXINE SODIUM 112 MCG: 112 TABLET ORAL at 06:32

## 2020-01-01 RX ADMIN — LEVOTHYROXINE SODIUM 112 MCG: 112 TABLET ORAL at 09:01

## 2020-01-01 RX ADMIN — LEVOTHYROXINE SODIUM 112 MCG: 112 TABLET ORAL at 05:55

## 2020-01-01 RX ADMIN — METOPROLOL TARTRATE 25 MG: 25 TABLET, FILM COATED ORAL at 09:56

## 2020-01-01 RX ADMIN — ATORVASTATIN CALCIUM 80 MG: 80 TABLET, FILM COATED ORAL at 21:13

## 2020-01-01 RX ADMIN — HEPARIN SODIUM AND DEXTROSE 12 UNITS/KG/HR: 10000; 5 INJECTION INTRAVENOUS at 14:22

## 2020-01-01 RX ADMIN — METOPROLOL TARTRATE 25 MG: 25 TABLET, FILM COATED ORAL at 21:13

## 2020-01-01 RX ADMIN — DOCUSATE SODIUM 100 MG: 100 CAPSULE, LIQUID FILLED ORAL at 21:13

## 2020-01-01 RX ADMIN — ONDANSETRON 4 MG: 2 INJECTION INTRAMUSCULAR; INTRAVENOUS at 07:59

## 2020-01-01 RX ADMIN — SODIUM CHLORIDE: 9 INJECTION, SOLUTION INTRAVENOUS at 05:45

## 2020-01-01 RX ADMIN — WARFARIN SODIUM 2.5 MG: 5 TABLET ORAL at 18:44

## 2020-01-01 RX ADMIN — SODIUM CHLORIDE, PRESERVATIVE FREE 10 ML: 5 INJECTION INTRAVENOUS at 10:08

## 2020-01-01 RX ADMIN — IOPAMIDOL 80 ML: 755 INJECTION, SOLUTION INTRAVENOUS at 14:33

## 2020-01-01 RX ADMIN — SODIUM CHLORIDE, PRESERVATIVE FREE 10 ML: 5 INJECTION INTRAVENOUS at 21:49

## 2020-01-01 RX ADMIN — ASPIRIN 81 MG: 81 TABLET, COATED ORAL at 10:08

## 2020-01-01 RX ADMIN — SODIUM CHLORIDE: 900 INJECTION INTRAVENOUS at 15:23

## 2020-01-01 RX ADMIN — CLOPIDOGREL BISULFATE 75 MG: 75 TABLET ORAL at 01:28

## 2020-01-01 RX ADMIN — SODIUM CHLORIDE, PRESERVATIVE FREE 10 ML: 5 INJECTION INTRAVENOUS at 20:43

## 2020-01-01 RX ADMIN — LACTULOSE 20 G: 10 SOLUTION ORAL at 09:56

## 2020-01-01 RX ADMIN — SODIUM CHLORIDE, PRESERVATIVE FREE 10 ML: 5 INJECTION INTRAVENOUS at 09:46

## 2020-01-01 RX ADMIN — LACTULOSE 20 G: 10 SOLUTION ORAL at 08:37

## 2020-01-01 RX ADMIN — HEPARIN SODIUM AND DEXTROSE 15.99 UNITS/KG/HR: 10000; 5 INJECTION INTRAVENOUS at 15:55

## 2020-01-01 RX ADMIN — ASPIRIN 81 MG: 81 TABLET, COATED ORAL at 21:48

## 2020-01-01 RX ADMIN — METOPROLOL TARTRATE 25 MG: 25 TABLET, FILM COATED ORAL at 09:10

## 2020-01-01 RX ADMIN — ENOXAPARIN SODIUM 40 MG: 40 INJECTION SUBCUTANEOUS at 08:37

## 2020-01-01 ASSESSMENT — PAIN SCALES - GENERAL
PAINLEVEL_OUTOF10: 0
PAINLEVEL_OUTOF10: 10
PAINLEVEL_OUTOF10: 0

## 2020-01-01 ASSESSMENT — ENCOUNTER SYMPTOMS
COLOR CHANGE: 0
WHEEZING: 0
DIARRHEA: 0
ABDOMINAL PAIN: 0
VOMITING: 0
CHEST TIGHTNESS: 0
CONSTIPATION: 0
COUGH: 0
EYE PAIN: 0
SHORTNESS OF BREATH: 1
PHOTOPHOBIA: 0
BLOOD IN STOOL: 0
BACK PAIN: 0
NAUSEA: 0

## 2020-09-20 PROBLEM — R51.9 HEADACHE: Status: ACTIVE | Noted: 2020-01-01

## 2020-09-20 NOTE — ED NOTES
Porcupine,lactic acid, venous ph,ammonia and first set of blood cultures were drawn on patient     Jennifer Peacockem  09/20/20 2837

## 2020-09-20 NOTE — ED PROVIDER NOTES
Triage Chief Complaint:   Headache and Diplopia        Of note, this patient was also evaluated by the attending physician, Dr. Julia Valero. [place]:  Modesto Apodaca is a 80 y.o. female that presents granddaughter for altered mental status, vision changes. Last known well unknown. Granddaughter states that she was scheduled to attend family wedding yesterday and did not arrive. Patient was called by telephone and sounded confused and \"off\". Family member went to the house and patient was laying on couch and appeared to be confused and thus was brought to the emergency department for evaluation. Patient is unable to determine when her last known well was. She states yesterday she notes frontal headache with associated vision changes-she is unable to qualify changes in vision-patient states she can see the object but cannot state what it is. REVIEW OF SYSTEMS    Review of systems somewhat limited due to patient appears afraid, slightly panicked and offer scattered information, sometimes does not answer my questions directly. General:  No fevers  Eyes: See HPI.  ENT:  No hearing changes  Cardiovascular:  No chest pain, no palpitations  Respiratory:  No shortness of breath, no cough, no wheezing  Gastrointestinal:  No pain, no nausea, no vomiting  Musculoskeletal:  No muscle pain, no joint pain  Skin:  No rash  Neurologic:  See HPI  Genitourinary:  No dysuria, no hematuria  Endocrine:  No unexpected weight gain, no unexpected weight loss  Extremities:  no edema, no pain    All other review of systems are negative  See HPI and nursing notes for additional information      Physical Exam:  ED Triage Vitals [09/20/20 1351]   Enc Vitals Group      BP (!) 158/84      Pulse 83      Resp 18      Temp       Temp src       SpO2 98 %      Weight       Height       Head Circumference       Peak Flow       Pain Score       Pain Loc       Pain Edu? Excl. in [address]?         GENERAL APPEARANCE: Awake and alert to my presence. Patient appears anxious. HEAD: Normocephalic. Atraumatic. EYES:  EOM's grossly intact. Sclera anicteric. ENT: Mucous membranes are moist. Tolerates saliva. No trismus. NECK: Supple. No meningismus. Trachea midline. HEART: RRR. Radial pulses 2+. LUNGS: Respirations unlabored. CTAB  ABDOMEN: Soft. Non-tender. No guarding or rebound. EXTREMITIES: No acute deformities. SKIN: Warm and dry. Neurologic:    - Alert & oriented person. Disoriented to place and time. No speech difficulties or slurring.  - No obvious gross motor deficits, however I am unable to fully assess neurological exam as well as NIH stroke scale given that patient does not cooperate, follow my directions. She repetitively states she \"cannot see\" but notes that indeed she can see objects that I hold in her visual field but is unable to identify them. She does not follow my directions for finger-to-nose, rapid alternating movements, heel-to-shin, pronator drift, extremity strength assessment or extremity sensation assessment  -No truncal ataxia  -Negative skew test bilateral eyes      ----------------------------------------------------------------------------------------------------------------------    NIH Stroke Scale  (Total score at bottom)        (1A) LOC  (Alert?):  0 - alert; keenly responsive    (1B) LOC Questions (Month / Age):  2 - answers neither question correctly     (1C) LOC Command  (Close eyes, squeeze my hands):  1 - performs one task correctly    (2) Best Gaze  (Eyes open-patient follows finger or face):   -Unable to assess due to poor patient cooperation, not following my commands or answering my questions    (3) Visual  (Introduce visual stimulus to patient's visual field quadrants):   -Unable to assess due to poor patient cooperation, not following my commands or answering my questions    (4)  Facial palsy  (Show teeth, raise eyebrows and squeeze eyes shut): No obvious facial palsy.   Again not able to confidently assess due to poor patient cooperation    (5) Motor arms  (Elevate extremity to 90° in sitting or 45° if supine -  score drift/movement)       (5A)  motor arm LEFT :   -Unable to assess due to poor patient cooperation, not following my commands or answering my questions       (5B) motor arm RIGHT:    -Unable to assess due to poor patient cooperation, not following my commands or answering my questions      (6) Motor legs  (Elevate extremity to 30° while supine and score drift/movement)       (6A)  motor leg LEFT:    -Unable to assess due to poor patient cooperation, not following my commands or answering my questions       (6B) motor leg RIGHT:   -Unable to assess due to poor patient cooperation, not following my commands or answering my questions      (7)  Limb Ataxia  (Finger-nose, heel-shin):   -Unable to assess due to poor patient cooperation, not following my commands or answering my questions    (8) Sensory  (face, arms trunk, and legs-compare side to side):  -Unable to assess due to poor patient cooperation, not following my commands or answering my questions    (9)  Best language  (Name items, describes a picture, read sentence-see attached testing cards): No obvious slurring or difficulty enunciating.    (10)  Dysarthria  (Evaluate speech clarity by patient repeating listed words):  0 - normal    (11)  Extinction and inattention  (can feel \"left, right, or both sides\" of face, arms, legs w/ closed eyes) (can see moving index finger in \"left, right, or both\":   -Unable to assess due to poor patient cooperation, not following my commands or answering my questions      Total NIH stroke scale score-unable to confidently assess score given patient's poor cooperation, not following commands mentioned above.    ----------------------------------------------------------------------------------------------------------------------        LABS:  Results for orders placed or performed during the hospital encounter of 09/20/20   CBC Auto Differential   Result Value Ref Range    WBC 8.5 4.0 - 10.5 K/CU MM    RBC 4.18 (L) 4.2 - 5.4 M/CU MM    Hemoglobin 13.0 12.5 - 16.0 GM/DL    Hematocrit 37.5 37 - 47 %    MCV 89.7 78 - 100 FL    MCH 31.1 (H) 27 - 31 PG    MCHC 34.7 32.0 - 36.0 %    RDW 13.0 11.7 - 14.9 %    Platelets 789 932 - 826 K/CU MM    MPV 9.3 7.5 - 11.1 FL    Differential Type AUTOMATED DIFFERENTIAL     Segs Relative 75.1 (H) 36 - 66 %    Lymphocytes % 14.0 (L) 24 - 44 %    Monocytes % 8.3 (H) 0 - 4 %    Eosinophils % 1.8 0 - 3 %    Basophils % 0.6 0 - 1 %    Segs Absolute 6.4 K/CU MM    Lymphocytes Absolute 1.2 K/CU MM    Monocytes Absolute 0.7 K/CU MM    Eosinophils Absolute 0.2 K/CU MM    Basophils Absolute 0.1 K/CU MM    Nucleated RBC % 0.0 %    Total Nucleated RBC 0.0 K/CU MM    TRC 0.0665 K/CU MM    Total Immature Neutrophil 0.02 K/CU MM    Immature Neutrophil % 0.2 0 - 0.43 %   Comprehensive Metabolic Panel w/ Reflex to MG   Result Value Ref Range    Sodium 122 (L) 135 - 145 MMOL/L    Potassium 3.9 3.5 - 5.1 MMOL/L    Chloride 89 (L) 99 - 110 mMol/L    CO2 19 (L) 21 - 32 MMOL/L    BUN 24 (H) 6 - 23 MG/DL    CREATININE 0.7 0.6 - 1.1 MG/DL    Glucose 114 (H) 70 - 99 MG/DL    Calcium 8.7 8.3 - 10.6 MG/DL    Alb 3.5 3.4 - 5.0 GM/DL    Total Protein 6.7 6.4 - 8.2 GM/DL    Total Bilirubin 0.5 0.0 - 1.0 MG/DL    ALT 13 10 - 40 U/L    AST 20 15 - 37 IU/L    Alkaline Phosphatase 54 40 - 129 IU/L    GFR Non-African American >60 >60 mL/min/1.73m2    GFR African American >60 >60 mL/min/1.73m2    Anion Gap 14 4 - 16   Troponin   Result Value Ref Range    Troponin T <0.010 <0.01 NG/ML   Protime-INR   Result Value Ref Range    Protime 12.5 11.7 - 14.5 SECONDS    INR 1.03 INDEX   Urinalysis   Result Value Ref Range    Color, UA YELLOW YELLOW    Clarity, UA HAZY (A) CLEAR    Glucose, Urine NEGATIVE NEGATIVE MG/DL    Bilirubin Urine NEGATIVE NEGATIVE MG/DL    Ketones, Urine SMALL (A) NEGATIVE MG/DL    Specific Gravity,

## 2020-09-20 NOTE — ED PROVIDER NOTES
12 lead EKG per my interpretation:  Atrial Flutter 3:1 84 bpm, LAFB  Axis is   Left axis deviation  QTc is  472  There is specific T wave changes appreciated. Inverted T wave III  There is no specific ST wave changes appreciated.     Prior EKG to compare with was not available        Ehsan Burciaga DO  09/20/20 3397

## 2020-09-20 NOTE — ED NOTES
Bed: ED-18  Expected date:   Expected time:   Means of arrival:   Comments:  rajwinder Walter, MARIANELA  09/20/20 [id number]

## 2020-09-20 NOTE — ED PROVIDER NOTES
eMERGENCY dEPARTMENT eNCOUnter    Attending note    I cared for and evaluated the patient in conjunction with the ED Advanced Practice Provider    HPI/Physical Exam/Medical Decision Making  Seun Hess is a 80 y.o. female here for evaluation of confusion and vision changes. The patient is here with her granddaughter who helps provide the history. The granddaughter states that there was a family wedding yesterday that they were expecting the patient to come to but she did not show up. They called her at home today and she seemed to be confused. They brought her here for further evaluation. The patient is complaining that she can't see correctly. Please see FRANCESCA note for further details. Vitals:   ED Triage Vitals [09/20/20 1351]   Enc Vitals Group      BP (!) 158/84      Pulse 83      Resp 18      Temp       Temp src       SpO2 98 %      Weight       Height       Head Circumference       Peak Flow       Pain Score       Pain Loc       Pain Edu? Excl. in [address]? EKG Interpretation  Interpreted by me  No prior to compare to  Rhythm: normal sinus   Rate: normal 84  Axis: normal  Ectopy: none  Conduction: LAFB  ST Segments: normal  T Waves: inverted in lead 3, otherwise normal  Clinical Impression: normal sinus rhythm, LAFB, inverted t-waves in lead 3    Cardiac Monitor Strip Interpretation  Interpreted by me  Monitor strip interpreted for greater than 10 seconds  Rhythm: normal sinus   Rate: normal  Ectopy: none  ST Segments: normal      On exam, the patient is afebrile and nontoxic appearing. She is hypertensive with a known history of hypertension and is asymptomatic. She is otherwise hemodynamically stable. She is confused at times. She has no focal neurological deficits. Airway is patent. Neck is supple. Heart sounds have a regular rate and rhythm. Lungs are clear to auscultation bilaterally. The patient's abdomen is soft, non-tender and non-distended with no peritoneal signs.  Extremities are warm, pink, and well perfused. EKG shows a normal sinus rhythm with no ST elevation or depression. Labs are obtained and are significant for hyponatremia. There are no other clinically significant lab abnormalities. CT head is negative for acute intracranial abnormality. There is cerebral parenchymal volume loss with chronic microvascular white matter ischemic disease. CT of the head and neck are obtained and there is a 25% stenosis in the proximal right internal carotid artery. There is no flow limiting stenosis in the left internal carotid artery. There is 50% stenosis and bilateral supraclinoid ICA's. Otherwise unremarkable CT of the head and neck. Chest x-rays obtained and shows interstitial changes of the lungs and central pulmonary vascular congestion. This likely reflects a degree of chronic underlying lung changes with possible superimposed mild edema versus pneumonitis. There is no consolidation. The patient was treated with Zofran. The etiology of her confusion and vision changes is unclear. We recommended admission to the hospital and the patient was agreeable. The patient will be admitted to the hospitalist.      All diagnostic, treatment, and disposition decisions were made by myself in conjunction with the advanced practice provider. For all further details of the patient's emergency department visit, please see the advanced practice provider's documentation. Comment: Please note this report has been produced using speech recognition software and may contain errors related to that system including errors in grammar, punctuation, and spelling, as well as words and phrases that may be inappropriate. If there are any questions or concerns please feel free to contact the dictating provider for clarification.        Chastity Welsh MD  09/22/20 6773 [address], [place]  09/22/20 1789

## 2020-09-20 NOTE — PROGRESS NOTES
Pt arrive to room 3008 from ED. Pt is very confused and pulling at all wires. Skin assessment completed. Skin is clean, dry, and intact. Bruising to right foot. Pt [place]. Oriented to room. Call light in reach and bed alarm on.

## 2020-09-20 NOTE — H&P
History and Physical      Name:  Nell Madrid /Age/Sex: 1928  (80 y.o. female)   MRN & CSN:  [id number] & [id number] Admission Date/Time: 2020  1:42 PM   Location:  ED18/ED-18 PCP: Ronald Myers MD       Admitting Physicians : Dr. Jeannine Beltran is a 80 y.o.  female  who presents with Headache and Diplopia    Assessment and Plan:   Headache concerning for CVA  CT head and CTA head and neck: No acute intracranial abnormality. Cerebral parenchymal volume loss with chronic microvascular white matter ischemic disease. There is a 25% stenosis in the proximal right internal carotid artery. No evidence of a flow-limiting stenosis in the left internal carotid artery. 50% stenosis in the bilateral supraclinoid ICA. No stroke alert initiated  - Start aspirin  -Not a candidate for statins  -Hold blood pressure medication to allow permissive hypertension  -Neuro check  -PT/ OT  -MRI brain    Acute moderate Hyponatremia-122  -IVF  -Monitor BMP q 4  -Check TSH and urine indices    Hypothyroidism  -TSH  -Continue    HTN, uncontrolled  -Hold valsartan and Bysystolic  -Labetalol PRN IV for SBP greater than 220 or DBP greater than 120          DVT-PPX: Lovenox  Diet: General after passing swallow eval      Medications:   Medications:    Infusions:    sodium chloride 60 mL/hr at 20 1523     PRN Meds:      Current Facility-Administered Medications:     0.9 % sodium chloride infusion, , Intravenous, Continuous, Arielle Kearneyma, Last Rate: 60 mL/hr at 20 1523    Current Outpatient Medications:     Nebivolol HCl (BYSTOLIC PO), Take  by mouth., Disp: , Rfl:     levothyroxine (SYNTHROID) 112 MCG tablet, Take 112 mcg by mouth Daily. , Disp: , Rfl:     Valsartan (DIOVAN PO), Take  by mouth., Disp: , Rfl:     History of present illness     Chief Complaint: Headache and Diplopia      Nell Madrid is a 80 y.o.  female  who presents with granddaughter for vision changes and altered mental Lifestyle    Physical activity     Days per week: None     Minutes per session: None    Stress: None   Relationships    Social connections     Talks on phone: None     Gets together: None     Attends Jainism service: None     Active member of club or organization: None     Attends meetings of clubs or organizations: None     Relationship status: None    Intimate partner violence     Fear of current or ex partner: None     Emotionally abused: None     Physically abused: None     Forced sexual activity: None   Other Topics Concern    None   Social History Narrative    None       Electronically signed by MAY Leo CNP on 9/20/2020 at 5:27 PM

## 2020-09-21 PROBLEM — I63.9 ACUTE CVA (CEREBROVASCULAR ACCIDENT) (HCC): Status: ACTIVE | Noted: 2020-01-01

## 2020-09-21 NOTE — CONSULTS
Continuous  levothyroxine (SYNTHROID) tablet 112 mcg, 112 mcg, Oral, Daily  sodium chloride flush 0.9 % injection 10 mL, 10 mL, Intravenous, 2 times per day  sodium chloride flush 0.9 % injection 10 mL, 10 mL, Intravenous, PRN  polyethylene glycol (GLYCOLAX) packet 17 g, 17 g, Oral, Daily PRN  promethazine (PHENERGAN) tablet 12.5 mg, 12.5 mg, Oral, Q6H PRN **OR** ondansetron (ZOFRAN) injection 4 mg, 4 mg, Intravenous, Q6H PRN  enoxaparin (LOVENOX) injection 40 mg, 40 mg, Subcutaneous, QPM  aspirin EC tablet 81 mg, 81 mg, Oral, Daily **OR** aspirin suppository 300 mg, 300 mg, Rectal, Daily  labetalol (NORMODYNE;TRANDATE) injection 10 mg, 10 mg, Intravenous, Q10 Min PRN  acetaminophen (TYLENOL) tablet 650 mg, 650 mg, Oral, Q4H PRN  Allergies:  Patient has no known allergies. Social History:  TOBACCO:   reports that she has never smoked. She does not have any smokeless tobacco history on file. ETOH:   reports no history of alcohol use. DRUGS:   reports no history of drug use. Family History:   History reviewed. No pertinent family history. REVIEW OF SYSTEMS:  CONSTITUTIONAL:  negative  HEENT:  negative  RESPIRATORY:  negative  CARDIOVASCULAR:  negative  GASTROINTESTINAL:  negative  GENITOURINARY:  negative  MUSCULOSKELETAL:  negative  BEHAVIOR/PSYCH:  Negative    ROS neg    Family hx neg    PHYSICAL EXAM  ------------------------  Vitals:  /75   Pulse 76   Temp 97.3 °F (36.3 °C) (Oral)   Resp 16   Ht 5' (1.524 m)   Wt 154 lb 2 oz (69.9 kg)   SpO2 94%   BMI 30.10 kg/m²      General:  Awake, alert, oriented X 3. Well developed, well nourished, well groomed. No apparent distress. HEENT:  Normocephalic, atraumatic. Pupils equal, round, reactive to light. No scleral icterus. No conjunctival injection. Normal lips, teeth, and gums. No nasal discharge.   Neck:  Supple  Heart:  RRR, no murmurs, gallops, rubs  Lungs:  CTA bilaterally, bilat symmetrical expansion, no wheeze, rales, or rhonchi  Abdomen: Bowel sounds present, soft, nontender, no masses, no organomegaly, no peritoneal signs  Extremities:  No clubbing, cyanosis, or edema  Skin:  Warm and dry, no open lesions or rash  Breast: deferred  Rectal: deferred  Genitalia:  deferred    NEUROLOGICAL EXAM  ---------------------------------    Mental Status Exam:             Alert and oriented times three,follows commands,speech and language intact    Cranial Olocwp-FL-PKY Intact.         Cranial nerve II           Visual acuity:  normal                 Cranial nerve III           Pupils:  equal, round, reactive to light      Cranial nerves III, IV, VI           Extraocular Movements: intact      Cranial nerve V           Facial sensation:  intact      Cranial nerve VII           Facial strength: intact      Cranial nerve VIII           Hearing:  intact      Cranial nerve IX           Palate:  intact      Cranial nerve XI         Shoulder shrug:  intact      Cranial nerve XII          Tongue movement:  normal    Motor:    Drift:  absent  Motor exam is symmetrical 5 out of 5 all extremities bilaterally  Tone:  normal  Abnormal Movements:  Absent    DTRs-2+ biceps,triceps,brachioradialis,knee jerks and ankle jerks bilaterally symmetrical.  Toes-downgoing bilaterally            Sensory:normal sensation              CBC with Differential:    Lab Results   Component Value Date    WBC 8.5 09/20/2020    RBC 4.18 09/20/2020    HGB 13.0 09/20/2020    HCT 37.5 09/20/2020     09/20/2020    MCV 89.7 09/20/2020    MCH 31.1 09/20/2020    MCHC 34.7 09/20/2020    RDW 13.0 09/20/2020    SEGSPCT 75.1 09/20/2020    LYMPHOPCT 14.0 09/20/2020    MONOPCT 8.3 09/20/2020    BASOPCT 0.6 09/20/2020    MONOSABS 0.7 09/20/2020    LYMPHSABS 1.2 09/20/2020    EOSABS 0.2 09/20/2020    BASOSABS 0.1 09/20/2020    DIFFTYPE AUTOMATED DIFFERENTIAL 09/20/2020     CMP:    Lab Results   Component Value Date     09/21/2020    K 4.5 09/21/2020    CL 95 09/21/2020    CO2 21 09/21/2020    BUN 24 09/21/2020    CREATININE 0.8 09/21/2020    GFRAA >60 09/21/2020    LABGLOM >60 09/21/2020    GLUCOSE 110 09/21/2020    PROT 6.7 09/20/2020    LABALBU 3.5 09/20/2020    CALCIUM 8.7 09/21/2020    BILITOT 0.5 09/20/2020    ALKPHOS 54 09/20/2020    AST 20 09/20/2020    ALT 13 09/20/2020     BMP:    Lab Results   Component Value Date     09/21/2020    K 4.5 09/21/2020    CL 95 09/21/2020    CO2 21 09/21/2020    BUN 24 09/21/2020    LABALBU 3.5 09/20/2020    CREATININE 0.8 09/21/2020    CALCIUM 8.7 09/21/2020    GFRAA >60 09/21/2020    LABGLOM >60 09/21/2020    GLUCOSE 110 09/21/2020     PT/INR:    Lab Results   Component Value Date    PROTIME 12.5 09/20/2020    INR 1.03 09/20/2020     PTT:  No results found for: APTT, PTT[APTT  U/A:    Lab Results   Component Value Date    COLORU YELLOW 09/20/2020    WBCUA 5 09/20/2020    RBCUA 17 09/20/2020    MUCUS RARE 09/20/2020    TRICHOMONAS NONE SEEN 09/20/2020    BACTERIA NEGATIVE 09/20/2020    CLARITYU HAZY 09/20/2020    SPECGRAV 1.034 09/20/2020    LEUKOCYTESUR TRACE 09/20/2020    UROBILINOGEN NORMAL 09/20/2020    BILIRUBINUR NEGATIVE 09/20/2020    BLOODU NEGATIVE 09/20/2020     TSH:  No results found for: TSH  VITAMIN B12: No components found for: B12  FOLATE:  No results found for: FOLATE  RPR:  No results found for: RPR  BROCK:  No results found for: ANATITER, BROCK  Urine Toxicology:  No components found for: IAMMENTA, IBARBIT, IBENZO, ICOCAINE, IMARTHC, IOPIATES, IPHENCYC     IMPRESSION:    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 Yassine Real MD  BOARD CERTIFIED-NEUROLOGY.

## 2020-09-21 NOTE — CARE COORDINATION
Attempted to see patient for discharge planning and she was sleeping soundly. 2:25 PM  Chart reviewed, in to see pt for dc planning. Introduced self and role explained. Pt was admitted for neuro work up and found to have an acute CVA. States she lives at home alone and is able to call her family when she needs anything though does not have regular visits. Reviewed therapy rec of ARU and she adamantly denied this and also denied HHC. States she has no new deficits as she has a broken bone in her foot and failed knee replacements. Explained she follows with PCP, has insurance with affordable RX co-pays and reliable transportation per herself and friends. CM did speak with tech who states patient ambulates with no hands on assistance to bathroom with her cane. CM also called her granddaughter/Cindy [phone] while in the room and updated her of patient plan for home without [facility] and she is agreeable and states family will check in more often. CM remains available if needs arise. 3:00 PM  Updated [facility] admissions patient going home.

## 2020-09-21 NOTE — PROGRESS NOTES
[address] [place] [address]  HOSPITALIST PROGRESS NOTE                       Name:  Donnell Mendez /Age/Sex: 1928  (80 y.o. female)   MRN & CSN:  [id number] & [id number] Admission Date/Time: 2020  1:42 PM   Location:  [facility]3008- Attending:  Tracy Burns MD                                                  HPI  Donnell Mendez is a 80 y.o. female who presents with headache    SUBJECTIVE  - less headache today, speech not very clear at times. 10 point review of systems reviewed and negative unless noted above. ALLERGIES: No Known Allergies    PCP: Aditi Russell MD    PAST MEDICAL HISTORY, SURGICAL HISTORY, SOCIAL HISTORY and  HOME MEDICATIONS all reviewed. OBJECTIVE  Vitals:    20 0101 20 0512 20 0905 20 1000   BP: 132/67 (!) 147/73  (!) 142/71   Pulse: 78 62 74 75   Resp: 18 15  19   Temp: 97.4 °F (36.3 °C) 97.6 °F (36.4 °C)  97.3 °F (36.3 °C)   TempSrc: Oral Oral  Oral   SpO2: 92% 94%  94%       PHYSICAL EXAM   GEN Awake female, sitting upright in bed in no apparent distress. [place] EYES Pupils are equally round. No scleral erythema, discharge, or conjunctivitis. HENT Mucous membranes are moist. Oral pharynx without exudates, no evidence of thrush. NECK Supple, no apparent thyromegaly or masses. RESP Clear to auscultation, no wheezes, rales or rhonchi. Symmetric chest movement  CARDIO/VASC S1/S2 auscultated. Regular rate without appreciable murmurs, rubs, or gallops. No JVD or carotid bruits. Peripheral pulses equal bilaterally and palpable. No peripheral edema. GI Abdomen is soft without significant tenderness, masses, or guarding. Bowel sounds are normoactive. Rectal exam deferred.  No costovertebral angle tenderness. Normal appearing external genitalia. Martin catheter is not present. HEME/LYMPH No palpable cervical lymphadenopathy and no hepatosplenomegaly. No petechiae or ecchymoses. MSK Spontaneous movement of all extremities. No gross joint deformities.   SKIN Normal coloration, warm, dry. NEURO Cranial nerves appear grossly intact, normal speech, no lateralizing weakness. INTAKE: In: 775 [I.V.:775]  Out: -   OUTPUT: In: 775   Out: -     LABS  Recent Labs     09/20/20  1420   WBC 8.5   HGB 13.0   HCT 37.5         Recent Labs     09/20/20  1420 09/20/20  2102 09/21/20  0328   * 128* 127*   K 3.9 4.4 4.5   CL 89* 93* 95*   CO2 19* 21 21   BUN 24* 23 24*   CREATININE 0.7 0.7 0.8     Recent Labs     09/20/20  1420   AST 20   ALT 13   BILITOT 0.5   ALKPHOS 54     Recent Labs     09/20/20  1420   INR 1.03     Recent Labs     09/20/20  1420   TROPONINT <0.010          Abnormal labs for today noted      Imaging:     ECHO:    Microbiology:  Blood culture:    Urine culture:    Sputum culture:    Procedures done this admission:    MEDS  Scheduled Meds:   levothyroxine  112 mcg Oral Daily    sodium chloride flush  10 mL Intravenous 2 times per day    enoxaparin  40 mg Subcutaneous QPM    aspirin  81 mg Oral Daily    Or    aspirin  300 mg Rectal Daily     Continuous Infusions:   sodium chloride 75 mL/hr at 09/21/20 0545     PRN Meds:sodium chloride flush, polyethylene glycol, promethazine **OR** ondansetron, labetalol, acetaminophen        ASSESSMENT and PLAN  Hospital Day: 2    1-Acute to subacute infarctions on MRI- in right occipital, left frontal and cerbellar hemispheres- 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 ASA/statin. 2-Acute moderate Hyponatremia-likely due to hypovolemia- improved with IVF- nephro managing.   3-HTN- hold antihypertensives for now  4-Hypothyroidism- on levothyroxine    -continue work up in progress, PT/OT         Updated grand daughter      Disp:     Diet DIET GENERAL; Daily Fluid Restriction: 1800 ml   DVT Prophylaxis [] Lovenox, []  Heparin, [] SCDs, [] Ambulation   GI Prophylaxis [] PPI,  [] H2 Blocker,  [] Carafate,  [] Diet/Tube Feeds   Code Status Full Code   Disposition Patient requires continued admission due to acute CVA   CMS Level of Risk [] Low, [] Moderate,[x]  High  Patient's risk as above due to acute CVA     NOELLE SHORT MD 9/21/2020 10:07 AM

## 2020-09-21 NOTE — CONSULTS
Nephrology Service Consultation    Patient:  Maricarmen Lee  MRN: [id number]  Consulting physician:  Baldemar Rojas MD  Reason for Consult:   Acute hyponatremia     History Obtained From:  patient  PCP: Sharri Everett MD    HISTORY OF PRESENT ILLNESS:   The patient is a 80 y.o. female who was transported to [facility] ED on 9/20. It is reported that the patient had presented to the ED with altered mental   Status and visual changes. Patient had reported having a frontal headache  And myalgias which had started on 9/18 and she was concerned that  She may have contracted the coronavirus. REVIEW OF SYSTEMS:  The ten point review of systems   are negative except as mentioned above. Medical History: HTN (2010) / hard of hearing / hypothyroidism   CVA: small acute to sub-acute infarctions: multiple sites (Sept. 2020)  Breast CA (circa 2000); in remission     Surgical History: Mastectomy   Bilateral knee replacement surgery     Renal History: no known history of nephrolithiasis / denies having RRT      SOCIAL HISTORY:   Tobacco: denies use     Alcohol: denies use     Recreational Drug Use: denies use     Demographic History; prior to admission: residing at home alone     Review of pertinent lab work and diagnostics available thus far:   Non-contrast Brain MRI from 9/20:   Scattered small acute to subacute infarctions, most pronounced in the right   occipital lobe, minimally in the left frontal lobe and bilateral cerebellar   hemispheres, may be embolic. Scattered small old lacunar infarcts. Mild parenchymal volume loss. Mild chronic microvascular disease.     Medications:   Scheduled Meds:   levothyroxine  112 mcg Oral Daily    sodium chloride flush  10 mL Intravenous 2 times per day    enoxaparin  40 mg Subcutaneous QPM    aspirin  81 mg Oral Daily    Or    aspirin  300 mg Rectal Daily     Continuous Infusions:   sodium chloride 75 mL/hr at 09/21/20 0545     PRN Meds:.sodium chloride flush, polyethylene glycol, promethazine **OR** ondansetron, labetalol, acetaminophen    Allergies:  Patient has no known allergies. Family History:   History reviewed. No pertinent family history. Physical Exam:    Vitals: BP (!) 147/73   Pulse 62   Temp 97.6 °F (36.4 °C) (Oral)   Resp 15   SpO2 94%     General appearance:  awake and verbally interactive   HEENT: Head: Normal, normocephalic, atraumatic. Neck: supple, symmetrical, trachea midline  Cardiovascular: S1 and S2: normal / no rub  Pulmonary: diminished lung sounds bilaterally  Abdomen:  soft / non-tender   Extremities: Trace edema to bilateral lower legs     CBC:   Recent Labs     09/20/20  1420   WBC 8.5   HGB 13.0        BMP:    Recent Labs     09/20/20  1420 09/20/20  2102 09/21/20  0328   * 128* 127*   K 3.9 4.4 4.5   CL 89* 93* 95*   CO2 19* 21 21   BUN 24* 23 24*   CREATININE 0.7 0.7 0.8   GLUCOSE 114* 107* 110*     Hepatic:   Recent Labs     09/20/20  1420   AST 20   ALT 13   BILITOT 0.5   ALKPHOS 54     Troponin: No results for input(s): TROPONINI in the last 72 hours. Mg, Phos: No results for input(s): MG, PHOS in the last 72 hours. ABGs: No results found for: PHART, PO2ART, WMR0QSP  INR:   Recent Labs     09/20/20  1420   INR 1.03     -----------------------------------------------------------------  Patient Active Problem List   Diagnosis Code    Headache R51     Assessment and Recommendations     Impression   1. Acute hyponatremia   2. Small acute to subacute infarct  3. HTN / headache  4. Metabolic Encephalopathy   5.   Hypothyroidism      PLAN   1.   -BMP Q6H to follow serum sodium trend   -NS reduced from 75 to 50 / hour   -serum sodium: 122 to 127 in approximately 13 hours  -additional labs: Urine Osm / Na / UPC / magnesium / phosphorous   -bladder scan ordered to screen for urinary retention   -please placed on fluid restriction when diet initiated   2.   -referral submitted to neuro for further evaluation 3   -BP trend: systolic BP's have recently been 132 - 149  -IV labetalol for systolic greater than 894  -no routine BP medications at present time and patient is NPO   Preceding swallow test  4.   -monitor affect and sensorium  -avoid CNS-altering medications   5.   -currently on synthyroid    Electronically signed by MAY Núñez - CNP      Nephrology Attending Progress Note  9/21/2020 10:04 AM  Subjective:   Admit Date: 9/20/2020  PCP: Carmine Magaña MD    Interval History: I have personally performed face to face diagnostic evaluation on this patient. I have personally reviewed pertinent labs and imaging and agree with the care plan above. My additional findings are as follows:  80year old white female with no diuretic prior and grandson physician here and asked renal monitor low na. Pt state did go to wedding this weekend and has some wine and state drink sig water. Present with severe headache and low na nad admitted work up. Pt wants to go home today.  Pt lives alone and chart state pt actually missed the wedding and no acute change cta head with contrast.     Objective:   Vitals: BP (!) 142/71   Pulse 75   Temp 97.3 °F (36.3 °C) (Oral)   Resp 19   SpO2 94%   Weak anxious  Soft nt  Decrease bs  Trace edema    Assessment and Plan:  IMP:  As stated above    Plan     1 bp stable today monitor  2 headache improved fu neuro eval- concern for cva on mri  3 slowly get na > 130 and then can plan for dc  4 concern if able care for herself in home setting- check pt/ot  5 restrict water  6 sp contrast and renal stable and maintain ivf for now  Will follow             Electronically signed by Maty Paez MD on 9/21/2020 at 10:04 AM

## 2020-09-21 NOTE — CONSULTS
[address] [facility], 9/22/1928, 3008/3008-A, 9/21/2020    History  [place]:  The primary encounter diagnosis was Altered mental status, unspecified altered mental status type. A diagnosis of Hyponatremia was also pertinent to this visit. Patient  has a past medical history of Cancer ([address] Hypertension, Osteoporosis, and TIA (transient ischemic attack). Patient  has a past surgical history that includes Breast surgery. Subjective:  Patient states:  \"I'm going home, get my clothes\". Pain:  Denies. Communication with other providers:  Handoff to RN, co-eval with OT. Restrictions: Fall risk    Home Setup/Prior level of function  Social/Functional History  Lives With: Alone  Type of Home: House  ADL Assistance: Independent  Homemaking Assistance: Independent  Ambulation Assistance: Independent  Transfer Assistance: [address]: traveling    Examination of body systems (includes body structures/functions, activity/participation limitations):  · Observation:  Supine in bed upon arrival  · Vision:  [employer]  · Hearing:  [facility] [employer]  · Cardiopulmonary:  No O2 needs  · Cognition: min confusion and decreased insight, impulsive, see OT/SLP note for further evaluation. Musculoskeletal  · ROM R/L:  WFL. · Strength R/L:  4+/5, min weakness in function and endurance. · Neuro:  L visuospatial deficits       Mobility:  · Supine to sit:  SBA  · Transfers: CGA  · Sitting balance:  SBA. · Standing balance:  CGA. · Gait: CGA    [facility] 6 Clicks Inpatient Mobility:  18/24    Treatment:  Sup to sit: SBA from flat bed. Sitting balance: SBA    Transfers: STS from bed and commode CGA for safety. Gait: ambulated x10ft, x50ft x2, no AD, unable to navigate around L visual field objects in environment without prompting. Unable to fully educate on CVA and CVA rehab as patient has not been updated on MRI results.   Limited ability to educate on deficits, patient dismissive during this conversation. Safety: patient left in chair with chair alarm, call light within reach, RN notified, gait belt used. Assessment:  Patient is a 79 yo female who presents with acute CVA in R occipital, L frontal and cerebellar hemispheres, mild dysarthria and noted balance deficits and visouspatial deficits. Rec d/c to ARU to address new deficits. Complexity: Moderate  Prognosis: Good, no significant barriers to participation at this time. Plan Times per week: 3/week, 1 week,      Equipment: TBD    Goals:  Short term goals  Time Frame for Short term goals: 1 week  Short term goal 1: Patient will ambulate x200ft mod I. Short term goal 2: Patient will ambulate with appropriate environmental scanning with less than 2 cues. Treatment plan:  Bed mobility, transfers, balance, gait, TA, TX. Recommendations for NURSING mobility: ambulate in halls with gait belt.     Time:   Time in: 0915  Time out: 0935  Timed treatment minutes: 10  Total time: 20    Electronically signed by:    Kendra Cisse, PT  9/21/2020, 3:56 PM

## 2020-09-21 NOTE — PROGRESS NOTES
Speech Language Pathology  Facility/Department: [address] [place] [address] BEDSIDE SWALLOW EVALUATION    NAME: Klarissa Bates  : 1928  MRN: [id number]    Impression  Dysphagia Diagnosis: Swallow function appears grossly intact  Dysphagia Outcome Severity Scale: Level 7: Normal in all situations     Klairssa Bates was seen for a clinical bedside swallow evaluation following admission for c/o severe headache, acute to subacute infarctions (right occipital, left frontal, and cerebellar hemispheres). CXR + for possible edema vs pneumonitis. Pt was alert, pleasant, and cooperative and denies dysphagia. She accurately provided [place] and reports mild dysarthria compared to her baseline but feels it is greatly improved. Pt followed complex commands for oral motor exam which demonstrated no focal or generalized weakness. Vocal quality and cough were WNL. PO trials of regular solids and thin liquids by straw from breakfast tray completed with normal oropharyngeal swallow. Speech and language were screened throughout the assessment and judged [facility] with occasional mild imprecise targets. Cognition appears at baseline as pt was able to provide accurate history and demonstrate normal insight regarding her speech. Recommend:  Regular/Thins. No needs identified at this time.      ADMISSION DATE: 2020  ADMITTING DIAGNOSIS: has Headache on their problem list.  ONSET DATE: 2020     Recent Chest Xray/CT of Chest:    Narrative    EXAMINATION:    ONE XRAY VIEW OF THE CHEST         2020 2:08 pm         COMPARISON:    Chest x-ray 2009; CT chest 2009         HISTORY:    ORDERING SYSTEM PROVIDED HISTORY: ams    TECHNOLOGIST PROVIDED HISTORY:    Reason for exam:->ams    Acuity: Acute    Type of Exam: Initial         FINDINGS:    Cardiac silhouette is stable.  Atherosclerotic changes of the aorta.  Mild    central pulmonary vascular congestion and mild interstitial opacities    bilaterally.  Findings likely reflect a degree of chronic underlying    interstitial lung changes with possible superimposed mild edema versus    pneumonitis.  No well-defined consolidation.  No pleural effusion or    pneumothorax.  Surgical clips project over the region of thyroid bed. Surgical clips also project over the bilateral axilla.              Impression    1. Interstitial change of the lungs and central pulmonary vascular    congestion.  Findings likely reflect a degree of chronic underlying lung    changes with possible superimposed mild edema versus pneumonitis.  No    well-defined consolidation. Date of Eval: 9/21/2020  Evaluating Therapist: Lian Crump    Current Diet level:  Current Diet : Regular  Current Liquid Diet : Thin    Primary Complaint  Patient Complaint: denies    Pain:  Pain Assessment  Pain Assessment: 0-10  Pain Level: 0  Patient's Stated Pain Goal: No pain    Reason for Referral  Tracy Rivera was referred for a bedside swallow evaluation to assess the efficiency of her swallow function, identify signs and symptoms of aspiration and make recommendations regarding safe dietary consistencies, effective compensatory strategies, and safe eating environment. Treatment Plan  Requires SLP Intervention: No  Duration/Frequency of Treatment: n/a  D/C Recommendations: To be determined       Recommended Diet and Intervention  Diet Solids Recommendation: Regular  Liquid Consistency Recommendation: Thin  Recommended Form of Meds: PO  Recommendations: Self feed       Compensatory Swallowing Strategies     Treatment/Goals  Short-term Goals  Timeframe for Short-term Goals: n/a  Long-term Goals  Timeframe for Long-term Goals: n/a    General  Chart Reviewed: Yes  Behavior/Cognition: Alert; Cooperative;Pleasant mood  Respiratory Status: Room air  O2 Device: None (Room air)  Communication Observation: Functional  Follows Directions: Complex  Dentition: Adequate  Patient Positioning: Upright in chair  Baseline Vocal Quality: Normal  Volitional Cough: Strong  Prior Dysphagia History: denies  Consistencies Administered: Reg solid; Thin - straw      Vision/Hearing  Vision  Vision: Within Functional Limits  Hearing  Hearing: Exceptions to [facility] Exceptions: Hard of hearing/hearing concerns    Oral Motor Deficits  Oral/Motor  Oral Motor:  Within functional limits    Oral Phase Dysfunction  Oral Phase  Oral Phase: WNL     Indicators of Pharyngeal Phase Dysfunction   Pharyngeal Phase  Pharyngeal Phase: WNL    Prognosis  Prognosis  Prognosis for safe diet advancement: excellent  Individuals consulted  Consulted and agree with results and recommendations: Patient;RN    Education  Patient Education: results WNL  Patient Education Response: Demonstrated understanding  Safety Devices in place: Yes  Type of devices: Left in bed       Therapy Time  SLP Individual Minutes  Time In: 6408  Time Out: [address]  Minutes: Sydni [id number], SLP  9/21/2020 10:44 AM

## 2020-09-21 NOTE — PROGRESS NOTES
Occupational Therapy  [facility] OCCUPATIONAL THERAPY EVALUATION    History  [place]:  The primary encounter diagnosis was Altered mental status, unspecified altered mental status type. A diagnosis of Hyponatremia was also pertinent to this visit. Restrictions:                            Communication with other providers: PT    Subjective:  Patient states:  \"I need to go home this morning\"  Pain:  None  Patient goal:  Home ASAP    Occupational profile (relevant social history and personal factors):    Social/Functional History  Lives With: Alone  Type of Home: House  ADL Assistance: Independent  Homemaking Assistance: Independent  Ambulation Assistance: Independent  Transfer Assistance: [address] [place] [address] Street: traveling    Examination of body systems (includes body structures/functions, activity/participation limitations):  · Orientation: WFL   · Cognition:  Reduced attention, impulsive, reduced safety and judgement   · Observation:  Received pt in bed. Alert and cooperative. Perseverates on going home ASAP. · Vision:  L visual field cut observed in function. Not tested formally. · Hearing:  WFL   · ROM:  WFL BUE observed in function  · Strength: WFL BUE observed in function. No unilateral weakness observed  · Sensation: WFL BUE  · Coordination: no overt impairments observed in function, not tested formally    ADLs  Feeding: CHELA    Grooming: CGA (for balance) in stance    Dressing: UB SBA in seated LB CGA    Bathing: UB CGA in seated LB CGA    Toileting: CGA    *Some ADL determined per observation of actual ADL performance, functional mobility, balance, activity tolerance, and cognition.      AM-PAC 6 click short form for inpatient daily activity:  Raw Score: 19  24/24 = unimpaired  23/24 = 1-20% impaired   20/24-22/24 = 21-40% impaired  15/24-19/24 = 41-59% impaired   10/24-14/24 = 60%-79% impaired  7/24-9/24 = 80%-99% impaired  6/24 = 100% impaired    Functional Mobility  Bed mobility:   Supine to sit: SBA    Sitting balance: SBA    Transfers: CGA sit>stand at chair, stand to sit in chair with safety cues. Standing balance: CGA    Ambulation: ambulated x10ft, x50ft x2, no AD, unable to navigate around L visual field objects in environment without prompting.       Activity tolerance  WFL    Assessment:  Assessment  Performance deficits / Impairments: Decreased functional mobility , Decreased endurance, Decreased balance, Decreased posture, Decreased ADL status, Decreased safe awareness, Decreased high-level IADLs, Decreased vision/visual deficit  Treatment Diagnosis: CVA (\"Scattered small acute to subacute infarctions, most pronounced in the right occipital lobe, minimally in the left frontal lobe and bilateral cerebellar hemispheres, may be embolic\")  Prognosis: Good  Decision Making: Medium Complexity  REQUIRES OT FOLLOW UP: Yes  Discharge Recommendations: IP Rehab      Goals:  By d/c or goals met:     Pt will perform all bed mobility with CHELA in prep for EOB/OOB activity. Pt will perform all functional transfers with SBA and appropriate use of LRD to bed, toilet, chair in prep for increased functional independence. Pt will perform UB ADLs with SBA to increase functional independence. Pt will perform LB ADLs with SBA to increase functional independence. Pt will perform all aspects of toileting with SBA to increase functional independence. Pt will participate in therex/therax c emphasis on strength, activity tolerance,  safety, CHELA tasks. Plan:  Plan  Times per week: 2x      Recommendation for activity with nursing staff:  Walk to bathroom c CGA    Treatment today:      Therapeutic Activity Training:   Therapeutic activity training was instructed today. Cues were given for safety, sequence, UE/LE placement, visual cues, and balance.     Activities performed today included bed mobility training, sup-sit, sit-stand, walk  Education: Role of OT, OT POC, d/c needs, home safety    Safety: Left in chair with all needs in reach. chair alarm applied. Gait belt used for transfer and mobility. Time in:  0912  Time out:  0934  Timed treatment minutes:  8  Total treatment time:  22    Electronically signed by:    [id number]utiérrez, OTR/L, JENIFER   [id number]   2:45 PM, 9/21/2020

## 2020-09-22 NOTE — DISCHARGE SUMMARY
[address] Hospitalist     Discharge Summary    Name:  Ban Encarnacion /Age/Sex: 1928  (80 y.o. female)   MRN & CSN:  [id number] & [id number] Admission Date/Time: 2020  1:42 PM   Attending:  Paula Thompson MD Discharging Physician: Paula Thompson MD     HPI:     Please, see admission HPI in [address] and patient's hospital course below    Hospital Course:   Ban Encarnacion is a 80 y.o.  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 - Na improved to 130 - follow up with nephrology as an outpatient    HTN- resume home BP medications as before admission    Hypothyroidism- on levothyroxine     Patient is hemodynamically stable for DC to home    The patient/family expressed appropriate understanding of and agreement with the discharge recommendations, medications, and plan.      Consults this admission:  IP CONSULT TO PHARMACY  PHARMACY TO CHANGE BASE FLUIDS  IP CONSULT TO HOSPITALIST  IP CONSULT TO NEPHROLOGY  IP CONSULT TO NEUROLOGY    Discharge Instruction:   Follow up appointments: Neurology  Primary care physician:  within 1 to 2 weeks    Diet:  regular diet   Activity: activity as tolerated  Disposition: Discharged to:   [x]Home, []C, []SNF, []Acute Rehab, []Hospice   Condition on discharge: Stable    Discharge Medications:      Saroj [facility] Medication Instructions AMF:[id number]    Printed on:20 8980   Medication Information                      aspirin 325 MG EC tablet  Take 1 tablet by mouth daily             atorvastatin (LIPITOR) 80 MG tablet  Take 1 tablet by mouth nightly             clopidogrel (PLAVIX) 75 MG tablet  Take 1 tablet by mouth daily             levothyroxine (SYNTHROID) 112 MCG tablet  Take 112 mcg by mouth Daily. Nebivolol HCl (BYSTOLIC PO)  Take  by mouth. Valsartan (DIOVAN PO)  Take  by mouth. Subjective _patient states that her headache has resolved, and denies any numbness or tingling or weakness of any of her extremities and she wanted to go home    Objective Findings at Discharge:   BP (!) 167/84   Pulse 77   Temp 98.2 °F (36.8 °C) (Oral)   Resp 23   Ht 5' (1.524 m)   Wt 154 lb 2 oz (69.9 kg)   SpO2 96%   BMI 30.10 kg/m²            PHYSICAL EXAM   GEN Awake female, resting in bed in no apparent distress. Appears given age. RESP Clear to auscultation, no wheezes, rales or rhonchi. CARDIO/VAS - S1/S2 auscultated. Regular rate without appreciable murmurs, rubs, or gallops. Peripheral pulses equal bilaterally and palpable. No peripheral edema. GI Abdomen is soft without significant tenderness, masses, or guarding. Bowel sounds are normoactive  MSK No gross joint deformities. Spontaneous movement of all extremities  SKIN Normal coloration, warm, dry. NEURO Cranial nerves appear grossly intact, normal speech, no lateralizing weakness.     BMP/CBC  Recent Labs     09/20/20  1420 09/20/20  2102 09/21/20  0328 09/22/20  0202   * 128* 127* 130*   K 3.9 4.4 4.5 4.2   CL 89* 93* 95* 98*   CO2 19* 21 21 20*   BUN 24* 23 24* 15   CREATININE 0.7 0.7 0.8 0.7   WBC 8.5  --   --   --    HCT 37.5  --   --   --      --   --   --          Discharge Time of 33 minutes    Electronically signed by Rajendra Salazar MD on 9/22/2020 at 3:39 PM

## 2020-09-22 NOTE — PROGRESS NOTES
Physical Therapy  Per nurse ok to tx but pt refusing tx at this time stating she is going home soon.

## 2020-09-22 NOTE — PROGRESS NOTES
Patient refused statin medication stating it makes her muscle pain worse and that Dr Juan Ramon Pedroza has her taking a yeast pill at home to help with cholesterol levels instead.

## 2020-09-22 NOTE — PROGRESS NOTES
Nephrology Progress Note  9/22/2020 7:02 AM  Subjective:   Admit Date: 9/20/2020    PCP: Ronald Myers MD    Interval History: Patient is voicing no pressing concerns during our visit    Diet: DIET GENERAL; Daily Fluid Restriction: 1800 ml    Data:   Scheduled Meds:   atorvastatin  80 mg Oral Nightly    aspirin  325 mg Oral Daily    Or    aspirin  300 mg Rectal Daily    levothyroxine  112 mcg Oral Daily    sodium chloride flush  10 mL Intravenous 2 times per day    enoxaparin  40 mg Subcutaneous QPM     Continuous Infusions:   sodium chloride 50 mL/hr at 09/22/20 0350     PRN Meds:sodium chloride flush, polyethylene glycol, promethazine **OR** ondansetron, labetalol, acetaminophen  No intake/output data recorded. No intake/output data recorded. No intake or output data in the 24 hours ending 09/22/20 0702  CBC:   Recent Labs     09/20/20  1420   WBC 8.5   HGB 13.0        BMP:    Recent Labs     09/20/20  2102 09/21/20  0328 09/22/20  0202   * 127* 130*   K 4.4 4.5 4.2   CL 93* 95* 98*   CO2 21 21 20*   BUN 23 24* 15   CREATININE 0.7 0.8 0.7   GLUCOSE 107* 110* 92     Hepatic:   Recent Labs     09/20/20  1420   AST 20   ALT 13   BILITOT 0.5   ALKPHOS 54     Troponin: No results for input(s): TROPONINI in the last 72 hours. BNP: No results for input(s): BNP in the last 72 hours.   Lipids:   Recent Labs     09/21/20  0751   CHOL 191   HDL 71     ABGs: No results found for: PHART, PO2ART, OMQ8YOX  INR:   Recent Labs     09/20/20  1420   INR 1.03     Renal Labs  Albumin:    Lab Results   Component Value Date    LABALBU 3.5 09/20/2020     Calcium:    Lab Results   Component Value Date    CALCIUM 8.6 09/22/2020     Phosphorus:    Lab Results   Component Value Date    PHOS 3.1 09/22/2020     U/A:    Lab Results   Component Value Date    NITRU NEGATIVE 09/20/2020    COLORU YELLOW 09/20/2020    WBCUA 5 09/20/2020    RBCUA 17 09/20/2020    MUCUS RARE 09/20/2020    TRICHOMONAS NONE SEEN 09/20/2020 BACTERIA NEGATIVE 09/20/2020    CLARITYU HAZY 09/20/2020    SPECGRAV 1.034 09/20/2020    UROBILINOGEN NORMAL 09/20/2020    BILIRUBINUR NEGATIVE 09/20/2020    BLOODU NEGATIVE 09/20/2020    KETUA SMALL 09/20/2020     ABG:  No results found for: PHART, SZB5FBI, PO2ART, BBZ2OXS, BEART, THGBART, JEB9VJF, R6EGHEMH  HgBA1c:    Lab Results   Component Value Date    LABA1C 6.2 09/21/2020     Microalbumen/Creatinine ratio:  No components found for: RUCREAT  TSH:  No results found for: TSH  IRON:  No results found for: IRON  Iron Saturation:  No components found for: PERCENTFE  TIBC:  No results found for: TIBC  FERRITIN:  No results found for: FERRITIN  RPR:  No results found for: RPR  BROCK:  No results found for: ANATITER, BROCK    Objective:   Patient Active Problem List:     Headache     Acute CVA (cerebrovascular accident) ([facility] [address]    BP (!) 154/72   Pulse 68   Temp 98.1 °F (36.7 °C) (Oral)   Resp 24   Ht 5' (1.524 m)   Wt 154 lb 2 oz (69.9 kg)   SpO2 94%   BMI 30.10 kg/m²      General appearance:  awake and verbally interactive   HEENT: Head: Normal, normocephalic, atraumatic. Neck: supple, symmetrical, trachea midline  Cardiovascular: S1 and S2: normal / no rub  Pulmonary: diminished lung sounds bilaterally  Abdomen:  soft / non-tender   Extremities: Trace edema to bilateral lower legs     Impression and Plan:    Impression   1. Acute hyponatremia   2. Small acute to subacute infarct  3. HTN / headache  4. Metabolic Encephalopathy   5. Hypothyroidism       PLAN   1.   -progressive and acceptable rise in serum sodium: latest 130  -BMP Q12h to monitor serum sodium trend  -fluid restriction 1.8 liters per day    -currently on NS which is ordered at 50 / hour  2.   -followed by neurology; recommended YOSELYN and loop recorder via cardiology  And increase ASA to 325 daily and plavix as well   3.    -BP trend: systolic BP's have recently been 138 - 155  -switched PRN IV labetalol to PRN clonidine  -started patient on low

## 2020-09-22 NOTE — PLAN OF CARE
Problem: Skin Integrity:  Goal: Will show no infection signs and symptoms  Description: Will show no infection signs and symptoms  Outcome: Ongoing  Goal: Absence of new skin breakdown  Description: Absence of new skin breakdown  Outcome: Ongoing     Problem: Falls - Risk of:  Goal: Will remain free from falls  Description: Will remain free from falls  Outcome: Ongoing  Goal: Absence of physical injury  Description: Absence of physical injury  Outcome: Ongoing     Problem: Confusion - Acute:  Goal: Absence of continued neurological deterioration signs and symptoms  Description: Absence of continued neurological deterioration signs and symptoms  Outcome: Ongoing  Goal: Mental status will be restored to baseline  Description: Mental status will be restored to baseline  Outcome: Ongoing     Problem: Discharge Planning:  Goal: Ability to perform activities of daily living will improve  Description: Ability to perform activities of daily living will improve  Outcome: Ongoing  Goal: Participates in care planning  Description: Participates in care planning  Outcome: Ongoing

## 2020-09-23 NOTE — PROGRESS NOTES
COMPARISON: 05/11/2013 HISTORY: ORDERING SYSTEM PROVIDED HISTORY: unknown last known well TECHNOLOGIST PROVIDED HISTORY: Has a \"code stroke\" or \"stroke alert\" been called? ->No Reason for exam:->unknown last known well Reason for Exam: AMS/ diplopia, unknown last known well Acuity: Acute Type of Exam: Initial Relevant Medical/Surgical History: no hx; ORDERING SYSTEM PROVIDED HISTORY: unknown last known well TECHNOLOGIST PROVIDED HISTORY: Has a \"code stroke\" or \"stroke alert\" been called? ->No Reason for exam:->unknown last known well Reason for Exam: AMS/ diplopia, unknown last known well Acuity: Acute Type of Exam: Initial FINDINGS: CT HEAD: BRAIN/VENTRICLES:  The cerebral and cerebellar parenchyma demonstrate volume loss. There are scattered and confluent low-attenuation areas noted in the periventricular white matter, compatible with chronic microvascular white matter ischemic disease. No areas of acute hemorrhage, mass, or midline shift. There are no abnormal extra-axial fluid collections. The ventricles are proportional to the cerebral sulci. Gray-white differentiation is maintained without evidence of acute infarct. ORBITS: Lens implants from prior cataract surgery are noted. The orbits are otherwise unremarkable. SINUSES:  The visualized paranasal sinuses and mastoid air cells demonstrate no acute abnormality. SOFT TISSUES/SKULL: No acute abnormality of the visualized skull or soft tissues. CTA NECK: AORTIC ARCH/ARCH VESSELS: Extensive atherosclerotic plaque is noted along the aortic arch. The visualized portions of the innominate and right subclavian artery are patent. There is fibrocalcific plaque in the proximal left subclavian artery with a 25% stenosis. The mid and distal left subclavian artery are patent. CAROTID ARTERIES: The arteries in the neck are severely tortuous, likely related to underlying hypertension. The right common carotid artery is normal in caliber.   Fibrocalcific plaque is noted in the proximal right internal carotid artery with a 25% stenosis using NASCET criteria. The left common carotid artery is normal in caliber. Fibrocalcific plaque is noted in the proximal left internal carotid artery without evidence of a flow-limiting stenosis using NASCET criteria. VERTEBRAL ARTERIES: Fibrocalcific plaque is noted at the right vertebral artery origin without evidence of a flow-limiting stenosis. The left vertebral artery is patent. Minimal fibrocalcific plaque is noted in the V4 segment of the right vertebral artery. SOFT TISSUES: There are areas of mosaic perfusion in the lung apices, likely related to underlying small airway disease (asthma, bronchiolitis, etc). The thyroid gland is not visualized. The submandibular glands and parotid glands are unremarkable. No appreciable cervical adenopathy. BONES: Degenerative changes are noted in the spine. Streak artifact from dental amalgam is noted. No fracture. CTA HEAD: ANTERIOR CIRCULATION: There are areas of fibrocalcific plaque in the cavernous and supraclinoid ICAs with areas of moderate narrowing measuring approximately 50% in bilateral supraclinoid ICAs. The anterior cerebral arteries are normal in appearance the middle cerebral arteries are normal in appearance. POSTERIOR CIRCULATION: The basilar artery is unremarkable. The posterior cerebral arteries are normal in appearance. OTHER: No dural venous sinus thrombosis on this non-dedicated study. 1. No acute intracranial abnormality. 2. Cerebral parenchymal volume loss with chronic microvascular white matter ischemic disease. 3. There is a 25% stenosis in the proximal right internal carotid artery. No evidence of a flow-limiting stenosis in the left internal carotid artery. 4. There are 50% stenoses in bilateral supraclinoid ICAs. 5. Otherwise unremarkable CTA of the head and neck.  6. Mosaic perfusion in the lung apices suggesting underlying small airway disease (asthma, bronchiolitis, etc). Xr Chest Portable    Result Date: 9/20/2020  EXAMINATION: ONE XRAY VIEW OF THE CHEST 9/20/2020 2:08 pm COMPARISON: Chest x-ray July 1, 2009; CT chest July 1, 2009 HISTORY: ORDERING SYSTEM PROVIDED HISTORY: ams TECHNOLOGIST PROVIDED HISTORY: Reason for exam:->ams Acuity: Acute Type of Exam: Initial FINDINGS: Cardiac silhouette is stable. Atherosclerotic changes of the aorta. Mild central pulmonary vascular congestion and mild interstitial opacities bilaterally. Findings likely reflect a degree of chronic underlying interstitial lung changes with possible superimposed mild edema versus pneumonitis. No well-defined consolidation. No pleural effusion or pneumothorax. Surgical clips project over the region of thyroid bed. Surgical clips also project over the bilateral axilla. 1. Interstitial change of the lungs and central pulmonary vascular congestion. Findings likely reflect a degree of chronic underlying lung changes with possible superimposed mild edema versus pneumonitis. No well-defined consolidation. Cta Neck W Contrast    Result Date: 9/20/2020  EXAMINATION: CTA OF THE NECK; CTA OF THE HEAD WITH CONTRAST; CT OF THE HEAD WITHOUT CONTRAST 9/20/2020 2:31 pm: TECHNIQUE: CTA of the neck was performed with the administration of intravenous contrast. Multiplanar reformatted images are provided for review. MIP images are provided for review. Stenosis of the internal carotid arteries measured using NASCET criteria. Dose modulation, iterative reconstruction, and/or weight based adjustment of the mA/kV was utilized to reduce the radiation dose to as low as reasonably achievable.; CTA of the head/brain was performed with the administration of intravenous contrast. Multiplanar reformatted images are provided for review. MIP images are provided for review.  Dose modulation, iterative reconstruction, and/or weight based adjustment of the mA/kV was utilized to reduce the radiation dose to as low as reasonably achievable.; CT of the head was performed without the administration of intravenous contrast. Dose modulation, iterative reconstruction, and/or weight based adjustment of the mA/kV was utilized to reduce the radiation dose to as low as reasonably achievable. Noncontrast CT of the head with reconstructed 2-D images are also provided for review. COMPARISON: 05/11/2013 HISTORY: ORDERING SYSTEM PROVIDED HISTORY: unknown last known well TECHNOLOGIST PROVIDED HISTORY: Has a \"code stroke\" or \"stroke alert\" been called? ->No Reason for exam:->unknown last known well Reason for Exam: AMS/ diplopia, unknown last known well Acuity: Acute Type of Exam: Initial Relevant Medical/Surgical History: no hx; ORDERING SYSTEM PROVIDED HISTORY: unknown last known well TECHNOLOGIST PROVIDED HISTORY: Has a \"code stroke\" or \"stroke alert\" been called? ->No Reason for exam:->unknown last known well Reason for Exam: AMS/ diplopia, unknown last known well Acuity: Acute Type of Exam: Initial FINDINGS: CT HEAD: BRAIN/VENTRICLES:  The cerebral and cerebellar parenchyma demonstrate volume loss. There are scattered and confluent low-attenuation areas noted in the periventricular white matter, compatible with chronic microvascular white matter ischemic disease. No areas of acute hemorrhage, mass, or midline shift. There are no abnormal extra-axial fluid collections. The ventricles are proportional to the cerebral sulci. Gray-white differentiation is maintained without evidence of acute infarct. ORBITS: Lens implants from prior cataract surgery are noted. The orbits are otherwise unremarkable. SINUSES:  The visualized paranasal sinuses and mastoid air cells demonstrate no acute abnormality. SOFT TISSUES/SKULL: No acute abnormality of the visualized skull or soft tissues. CTA NECK: AORTIC ARCH/ARCH VESSELS: Extensive atherosclerotic plaque is noted along the aortic arch.   The visualized portions of the innominate and right subclavian artery are patent. There is fibrocalcific plaque in the proximal left subclavian artery with a 25% stenosis. The mid and distal left subclavian artery are patent. CAROTID ARTERIES: The arteries in the neck are severely tortuous, likely related to underlying hypertension. The right common carotid artery is normal in caliber. Fibrocalcific plaque is noted in the proximal right internal carotid artery with a 25% stenosis using NASCET criteria. The left common carotid artery is normal in caliber. Fibrocalcific plaque is noted in the proximal left internal carotid artery without evidence of a flow-limiting stenosis using NASCET criteria. VERTEBRAL ARTERIES: Fibrocalcific plaque is noted at the right vertebral artery origin without evidence of a flow-limiting stenosis. The left vertebral artery is patent. Minimal fibrocalcific plaque is noted in the V4 segment of the right vertebral artery. SOFT TISSUES: There are areas of mosaic perfusion in the lung apices, likely related to underlying small airway disease (asthma, bronchiolitis, etc). The thyroid gland is not visualized. The submandibular glands and parotid glands are unremarkable. No appreciable cervical adenopathy. BONES: Degenerative changes are noted in the spine. Streak artifact from dental amalgam is noted. No fracture. CTA HEAD: ANTERIOR CIRCULATION: There are areas of fibrocalcific plaque in the cavernous and supraclinoid ICAs with areas of moderate narrowing measuring approximately 50% in bilateral supraclinoid ICAs. The anterior cerebral arteries are normal in appearance the middle cerebral arteries are normal in appearance. POSTERIOR CIRCULATION: The basilar artery is unremarkable. The posterior cerebral arteries are normal in appearance. OTHER: No dural venous sinus thrombosis on this non-dedicated study. 1. No acute intracranial abnormality.  2. Cerebral parenchymal volume loss with chronic microvascular white matter ischemic disease. 3. There is a 25% stenosis in the proximal right internal carotid artery. No evidence of a flow-limiting stenosis in the left internal carotid artery. 4. There are 50% stenoses in bilateral supraclinoid ICAs. 5. Otherwise unremarkable CTA of the head and neck. 6. Mosaic perfusion in the lung apices suggesting underlying small airway disease (asthma, bronchiolitis, etc). Cta Head W Contrast    Result Date: 9/20/2020  EXAMINATION: CTA OF THE NECK; CTA OF THE HEAD WITH CONTRAST; CT OF THE HEAD WITHOUT CONTRAST 9/20/2020 2:31 pm: TECHNIQUE: CTA of the neck was performed with the administration of intravenous contrast. Multiplanar reformatted images are provided for review. MIP images are provided for review. Stenosis of the internal carotid arteries measured using NASCET criteria. Dose modulation, iterative reconstruction, and/or weight based adjustment of the mA/kV was utilized to reduce the radiation dose to as low as reasonably achievable.; CTA of the head/brain was performed with the administration of intravenous contrast. Multiplanar reformatted images are provided for review. MIP images are provided for review. Dose modulation, iterative reconstruction, and/or weight based adjustment of the mA/kV was utilized to reduce the radiation dose to as low as reasonably achievable.; CT of the head was performed without the administration of intravenous contrast. Dose modulation, iterative reconstruction, and/or weight based adjustment of the mA/kV was utilized to reduce the radiation dose to as low as reasonably achievable. Noncontrast CT of the head with reconstructed 2-D images are also provided for review. COMPARISON: 05/11/2013 HISTORY: ORDERING SYSTEM PROVIDED HISTORY: unknown last known well TECHNOLOGIST PROVIDED HISTORY: Has a \"code stroke\" or \"stroke alert\" been called? ->No Reason for exam:->unknown last known well Reason for Exam: AMS/ diplopia, unknown last known well Acuity: Acute Type of Exam: Initial Relevant Medical/Surgical History: no hx; ORDERING SYSTEM PROVIDED HISTORY: unknown last known well TECHNOLOGIST PROVIDED HISTORY: Has a \"code stroke\" or \"stroke alert\" been called? ->No Reason for exam:->unknown last known well Reason for Exam: AMS/ diplopia, unknown last known well Acuity: Acute Type of Exam: Initial FINDINGS: CT HEAD: BRAIN/VENTRICLES:  The cerebral and cerebellar parenchyma demonstrate volume loss. There are scattered and confluent low-attenuation areas noted in the periventricular white matter, compatible with chronic microvascular white matter ischemic disease. No areas of acute hemorrhage, mass, or midline shift. There are no abnormal extra-axial fluid collections. The ventricles are proportional to the cerebral sulci. Gray-white differentiation is maintained without evidence of acute infarct. ORBITS: Lens implants from prior cataract surgery are noted. The orbits are otherwise unremarkable. SINUSES:  The visualized paranasal sinuses and mastoid air cells demonstrate no acute abnormality. SOFT TISSUES/SKULL: No acute abnormality of the visualized skull or soft tissues. CTA NECK: AORTIC ARCH/ARCH VESSELS: Extensive atherosclerotic plaque is noted along the aortic arch. The visualized portions of the innominate and right subclavian artery are patent. There is fibrocalcific plaque in the proximal left subclavian artery with a 25% stenosis. The mid and distal left subclavian artery are patent. CAROTID ARTERIES: The arteries in the neck are severely tortuous, likely related to underlying hypertension. The right common carotid artery is normal in caliber. Fibrocalcific plaque is noted in the proximal right internal carotid artery with a 25% stenosis using NASCET criteria. The left common carotid artery is normal in caliber. Fibrocalcific plaque is noted in the proximal left internal carotid artery without evidence of a flow-limiting stenosis using NASCET criteria.  VERTEBRAL HISTORY: ORDERING SYSTEM PROVIDED HISTORY: headache and diplopia TECHNOLOGIST PROVIDED HISTORY: Reason for exam:->headache and diplopia Reason for Exam: NKI, HA, No Surg FINDINGS: BRAIN/VENTRICLES: There are scattered small acute to subacute infarctions, most pronounced in the right occipital lobe, minimally in the left frontal lobe and bilateral cerebellar hemispheres, may be embolic. There are small old lacunar infarcts in the midbrain, right thalamus, right cerebellar hemisphere and bilateral periventricular white matter. There is mild parenchymal volume loss. There is periventricular white matter low attenuation, likely related to mild chronic microvascular disease. There is no acute intracranial hemorrhage, mass effect or midline shift. No abnormal extra-axial fluid collection. There is no hydrocephalus. ORBITS: The visualized portion of the orbits demonstrate no acute abnormality. SINUSES: The visualized paranasal sinuses and mastoid air cells demonstrate no acute abnormality. SOFT TISSUES/SKULL:  No acute abnormality of the visualized skull or soft tissues. Scattered small acute to subacute infarctions, most pronounced in the right occipital lobe, minimally in the left frontal lobe and bilateral cerebellar hemispheres, may be embolic. Scattered small old lacunar infarcts. Mild parenchymal volume loss. Mild chronic microvascular disease.        LAB RESULTS  --------------------    Recent Results (from the past 24 hour(s))   Basic Metabolic Panel w/ Reflex to MG    Collection Time: 09/22/20  2:02 AM   Result Value Ref Range    Sodium 130 (L) 135 - 145 MMOL/L    Potassium 4.2 3.5 - 5.1 MMOL/L    Chloride 98 (L) 99 - 110 mMol/L    CO2 20 (L) 21 - 32 MMOL/L    Anion Gap 12 4 - 16    BUN 15 6 - 23 MG/DL    CREATININE 0.7 0.6 - 1.1 MG/DL    Glucose 92 70 - 99 MG/DL    Calcium 8.6 8.3 - 10.6 MG/DL    GFR Non-African American >60 >60 mL/min/1.73m2    GFR African American >60 >60 mL/min/1.73m2   Phosphorus Collection Time: 09/22/20  2:02 AM   Result Value Ref Range    Phosphorus 3.1 2.5 - 4.9 MG/DL   Magnesium    Collection Time: 09/22/20  2:02 AM   Result Value Ref Range    Magnesium 1.9 1.8 - 2.4 mg/dl   Homocysteine, Serum    Collection Time: 09/22/20  2:02 AM   Result Value Ref Range    Homocysteine 9.0 0 - 10 umol/L         Medical problems    Patient Active Problem List:     Headache     Acute CVA (cerebrovascular accident) ([facility] [address]      ASSESSMENT:  ---------------------    Right frontal occipital bilateral cerebellar acute infarcts r/o cardio embolic event     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       Electronically signed by Angeline Smiley MD on 9/22/2020 at 8:42 PM SHORTNESS OF BREATH

## 2020-10-24 PROBLEM — I63.9 ACUTE CEREBROVASCULAR ACCIDENT (HCC): Status: ACTIVE | Noted: 2020-01-01

## 2020-10-24 NOTE — ED NOTES
Pharmacy notified of needed tpa and order for it.  Weight put in at this time      Garrick Glover RN  10/24/20 1954

## 2020-10-24 NOTE — ED NOTES
Impression    1. Findings most consistent with acute infarction of the left    parieto-occipital region.  No acute intracranial hemorrhage identified. 2. Chronic microvascular ischemic changes and global cerebral atrophy. Critical results were called by Dr. Emi Olvera MD to Dr. Marilu Gutierrez    on 10/24/2020 at 19:45.          Chilo Dowd RN  10/24/20 1952

## 2020-10-24 NOTE — ED PROVIDER NOTES
Emergency Department Encounter    Patient: Kayley Johnson  MRN: [id number]  : 1928  Date of Evaluation: 10/24/2020  ED Provider:  Karli Daily    Triage Chief Complaint:   Altered Mental Status ( Facial droop and slurred speech )    [place]:  Kayley Johnson is a 80 y.o. female that presents with complaint of left-sided facial droop and slurred speech. .  She reports that she was making her dinner and everything was normal at 5 PM, 525 noted the symptoms. Granddaughter last saw her at 80 and everything was normal.  Denies weakness in her extremities. No numbness in her extremities. Does have a history of stroke. She is on aspirin and Plavix. No chest pain or abdominal pain. No vomiting. No dizziness. No falls or head injury. No history of head bleed. No history of GI bleed. ROS - see HPI, below listed is current ROS at time of my eval:  10 systems reviewed and negative except as above      Past Medical History:   Diagnosis Date    Cancer ([facility] [address]     Hypertension     Osteoporosis     TIA (transient ischemic attack)      Past Surgical History:   Procedure Laterality Date    BREAST SURGERY       No family history on file. Social History     Socioeconomic History    Marital status:       Spouse name: Not on file    Number of children: Not on file    Years of education: Not on file    Highest education level: Not on file   Occupational History    Not on file   Social Needs    Financial resource strain: Not on file    Food insecurity     Worry: Not on file     Inability: Not on file    Transportation needs     Medical: Not on file     Non-medical: Not on file   Tobacco Use    Smoking status: Never Smoker   Substance and Sexual Activity    Alcohol use: No    Drug use: No    Sexual activity: Not on file   Lifestyle    Physical activity     Days per week: Not on file     Minutes per session: Not on file    Stress: Not on file   Relationships    Social connections     Talks on phone: Not on file     Gets together: Not on file     Attends Orthodoxy service: Not on file     Active member of club or organization: Not on file     Attends meetings of clubs or organizations: Not on file     Relationship status: Not on file    Intimate partner violence     Fear of current or ex partner: Not on file     Emotionally abused: Not on file     Physically abused: Not on file     Forced sexual activity: Not on file   Other Topics Concern    Not on file   Social History Narrative    Not on file     Current Facility-Administered Medications   Medication Dose Route Frequency Provider Last Rate Last Dose    sodium chloride flush 0.9 % injection 10 mL  10 mL Intravenous PRN Renaldo Ferrer MD         Current Outpatient Medications   Medication Sig Dispense Refill    aspirin 325 MG EC tablet Take 1 tablet by mouth daily 30 tablet 3    atorvastatin (LIPITOR) 80 MG tablet Take 1 tablet by mouth nightly 30 tablet 3    clopidogrel (PLAVIX) 75 MG tablet Take 1 tablet by mouth daily 30 tablet 3    Nebivolol HCl (BYSTOLIC PO) Take  by mouth.  levothyroxine (SYNTHROID) 112 MCG tablet Take 112 mcg by mouth Daily.  Valsartan (DIOVAN PO) Take  by mouth. No Known Allergies    Nursing Notes Reviewed    Physical Exam:  ED Triage Vitals [10/24/20 1953]   Enc Vitals Group      BP (!) 156/106      Pulse 124      Resp 16      Temp 97.6 °F (36.4 °C)      Temp Source Oral      SpO2 95 %      Weight 150 lb (68 kg)      Height 5' (1.524 m)      Head Circumference       Peak Flow       Pain Score       Pain Loc       Pain Edu? Excl. in [address]? My pulse ox interpretation is - normal    General appearance:  No acute distress. Skin:  Warm. Dry. No rash. Eye:  Extraocular movements intact. Ears, nose, mouth and throat:  Oral mucosa moist   Neck:  Trachea midline. Extremity:  No swelling. Normal ROM     Heart:  Regular rate and rhythm, normal S1 & S2, no extra heart sounds.     Perfusion:  intact Respiratory:  Lungs clear to auscultation bilaterally. Respirations nonlabored. Abdominal:  Normal bowel sounds. Soft. Nontender. Non distended. Neurological:      Mental Status Exam:   Alert and oriented times three, follows commands, speech and language intact though with mild slurred speech. Cranial [id number] Intact. Cranial nerve II  Visual acuity: normal  Cranial nerve III  Pupils: equal, round, reactive to light  Cranial nerves III, IV, VI  Extraocular Movements: intact  Cranial nerve V  Facial sensation: intact  Cranial nerve VII  Facial strength:+ left sided facial droop.    Cranial nerve VIII  Hearing: intact  Cranial nerve IX  Palate: intact  Cranial nerve XI  Shoulder shrug: intact  Cranial nerve XII  Tongue movement: normal    Motor:   Drift: absent  Motor exam is symmetrical 5 out of 5 all extremities bilaterally  Tone: normal  Abnormal Movements: Absent    DTRs- intact bilaterally and symmetrical.  Toes-downgoing bilaterally  Sensory:  Light Touch  Right Upper Extremity: normal  Left Upper Extremity: normal  Right Lower Extremity: normal  Left Lower Extremity: normal      I have reviewed and interpreted all of the currently available lab results from this visit (if applicable):  Results for orders placed or performed during the hospital encounter of 10/24/20   CBC Auto Differential   Result Value Ref Range    WBC 8.4 4.0 - 10.5 K/CU MM    RBC 4.79 4.2 - 5.4 M/CU MM    Hemoglobin 14.7 12.5 - 16.0 GM/DL    Hematocrit 43.4 37 - 47 %    MCV 90.6 78 - 100 FL    MCH 30.7 27 - 31 PG    MCHC 33.9 32.0 - 36.0 %    RDW 13.3 11.7 - 14.9 %    Platelets 351 569 - 572 K/CU MM    MPV 10.1 7.5 - 11.1 FL    Differential Type AUTOMATED DIFFERENTIAL     Segs Relative 71.6 (H) 36 - 66 %    Lymphocytes % 14.9 (L) 24 - 44 %    Monocytes % 10.1 (H) 0 - 4 %    Eosinophils % 2.6 0 - 3 %    Basophils % 0.6 0 - 1 %    Segs Absolute 6.0 K/CU MM    Lymphocytes Absolute 1.3 K/CU MM    Monocytes Absolute 0.9 K/CU MM    Eosinophils Absolute 0.2 K/CU MM    Basophils Absolute 0.1 K/CU MM    Nucleated RBC % 0.0 %    Total Nucleated RBC 0.0 K/CU MM    Total Immature Neutrophil 0.02 K/CU MM    Immature Neutrophil % 0.2 0 - 0.43 %   Protime-INR   Result Value Ref Range    Protime 12.2 11.7 - 14.5 SECONDS    INR 1.01 INDEX   POCT Glucose   Result Value Ref Range    Glucose 118 mg/dL   POCT Glucose   Result Value Ref Range    POC Glucose 118 (H) 70 - 99 MG/DL      Radiographs (if obtained):  Radiologist's Report Reviewed:  No results found. EKG (if obtained): (All EKG's are interpreted by myself in the absence of a cardiologist)  Atrial fibrillation with rapid ventricular response, rate of 105 bpm, left anterior fascicular block. No ST elevation. No previous to compare    Stroke alert timing details    1. Physician evaluation performed at: On arrival.   2.  Stroke alert called at: in field by EMS, recalled on arrival.   3.  CT results obtained at: 1941 - left parietal occipital lobe low attenuation consistent with acute stroke, not seen on CT from 9/20/2020.   4.  Decision to administer tPA at:  Initially discussed with patient, within time frame and ordered while awaiting telestroke evaluation, spoke with patient's POA- they are refusing TPA and transfer to [facility]        MDM:  58-year-old female with history as above presents with left facial droop and slurred speech that she reports started 5:25 PM, stroke alert was called by EMS and continued here, I saw her initially as they came through the ambulance bay doors, she is alert and oriented, speaking, protecting her airway and so was taken straight for head CT, I placed orders. Evaluated as soon as brought back to the room, positive facial droop and slurred speech, has a positive CT head as above, discussed TPA with her and plan to order and discuss with telestroke team.     2000- Jose Villela called, patient's POA.   Patient has waxing and waning mental status, Justin Forward is her POA, former ICU nurse. They had spoken even as recently as this week that she would potentially not even want to be transferred to the hospital if similar type symptoms were to occur as she had very similar type symptoms in September. They would not want to progress forward with TPA or with transfer to [facility], this is not in line with her previously stated wishes. They would want her to be admitted at [facility] for monitoring and observation as she does live alone. They do understand that she has had a stroke. 2009- spoke with Dr. Jacky Martinez at [facility]- discussed patient presentation and positive stroke on CT, patient's POA is granddaughter, discussion as above they are refusing TPA and transfer to [facility], are agreeable to admission here at [address] [place] [address] for observation. Plan for admission to hospitalist team.     Total critical care time today provided was 35 minutes. This excludes seperately billable procedure. Critical care time provided for stroke that required close evaluation and/or intervention with concern for patient decompensation. Clinical Impression:  1. Cerebrovascular accident (CVA), unspecified mechanism ([address]      Disposition referral (if applicable):  No follow-up provider specified. Disposition medications (if applicable):  New Prescriptions    No medications on file     ED Provider Disposition Time  DISPOSITION Decision To Admit 10/24/2020 08:56:12 PM      Comment: Please note this report has been produced using speech recognition software and may contain errors related to that system including errors in grammar, punctuation, and spelling, as well as words and phrases that may be inappropriate. Efforts were made to edit the dictations. \       Sky Vergara MD  10/24/20 4881 [id number]- called by radiology. Right internal carotid- new dissection, and new thrombus in the lumen- high risk for embolization, vessel is not occluded completely. No large vessel occluded in the brain.      Called patient's POA-left message at phone number listed to call back. Maria Luz Comer MD  10/24/20 9841      Updated patient's granddaughter- they want to continue current course, no intervention right now, will discuss code status. I have sent perfect serve to hospitalist NP Carmel Ceron to update as well.         Maria Luz Comer MD  10/24/20 8366

## 2020-10-24 NOTE — ED NOTES
Pt had stroke on 9/20 of last month pt great grand daughter stated. Pt great granddaughter stated around 31 [phone] the pt seem more confuse and had a (L) side droop. But pt did manage to walk the cart to get in the squad to come to [facility]. Pt was seen by Dr Cristo Veliz as she was being transported to [facility].       Shaye Morrell RN  10/24/20 7222

## 2020-10-25 NOTE — PROGRESS NOTES
Hospitalist Progress Note      Name:  Jono Emerson DOB/Age/Sex: 1928  (80 y.o. female)   MRN & CSN:  [id number] & [id number] Admission Date/Time: 10/24/2020  7:40 PM   Location:  - PCP: Ara Knutson MD         Hospital Day: 2    Assessment and Plan:   Jono Emerson is a 80 y.o.  female  who presents with slurred speech    · Acute CVA, left parietal -occipital lobe  -CT head: findings suggestive of Left parieto-occip acute infarct  -CTA head/neck-right internal carotid artery with acute intraluminal thrombus, acute dissection without complete vessel occlusion  -Did not get TPA, POA refused  -Neurology and cardio evaluation pending  -MRI brain pending  -Permissive hypertension, pressure medications on hold  -N.p.o. for now and swallow evaluation pending  -On aspirin, Plavix , statin  -Physical therapy on board    · Paroxysmal atrial fibrillation, rate controlled  -Not on any medication  -Cardiology was consulted    · Chronic hyponatremia, mild, improving  -On IV hydration    · Elevated troponin  -Serial troponin stable  -Cardiology on board    Chronic medical conditions  - HTN- Stable- holding valsartan and nebivolol for permissive HTN  - CVA hx 2020- cont home dapt  - HLD- Cont statin  - Hypothyroidism- Cont synthroid; recent TSH WNL   - Hx of breast ca, s/p bilateral mastectomies    Diet Diet NPO Effective Now   DVT Prophylaxis [] Lovenox, []  Heparin, [] SCDs, []No VTE prophylaxis, patient ambulating   GI Prophylaxis [] PPI, [] H2 Blocker, [] No GI prophylaxis, patient is receiving diet/Tube Feeds   Code Status Full Code   Disposition Patient requires continued admission due to    MDM [] Low, [] Moderate,[]  High  Patient's risk as above due to      History of Present Illness:     Pt S&E. Patient agitated, said she did not get TPA, feels slurred speech is much better, discussed with patient, understands better now.     10-14 point ROS reviewed negative, unless as noted above    Objective: Intake/Output Summary (Last 24 hours) at 10/25/2020 1211  Last data filed at 10/25/2020 0845  Gross per 24 hour   Intake 10 ml   Output --   Net 10 ml      Vitals:   Vitals:    10/25/20 1102   BP:    Pulse:    Resp: 20   Temp:    SpO2: 94%     Physical Exam:    GEN Awake female, sitting upright in bed in no apparent distress. Appears given age. EYES Pupils are equally round. No scleral erythema, discharge, or conjunctivitis. HENT Mucous membranes are moist.  Speech slurred, loss of nasolabial fold  NECK No apparent thyromegaly or masses. RESP Clear to auscultation, no wheezes, rales or rhonchi. Symmetric chest movement while on room air. CARDIO/VASC S1/S2 auscultated. Regular rate without appreciable murmurs, rubs, or gallops. Peripheral pulses equal bilaterally and palpable. No peripheral edema. GI Abdomen is soft without significant tenderness, masses, or guarding. Bowel sounds are normoactive. Rectal exam deferred.  Martin catheter is not present. HEME/LYMPH No petechiae or ecchymoses. MSK No gross joint deformities. Spontaneous movement of all extremities  SKIN Normal coloration, warm, dry. NEURO Cranial nerves appear grossly intact, normal speech, no lateralizing weakness. PSYCH Awake, alert, oriented x 4. Affect appropriate.     Medications:   Medications:    docusate sodium  100 mg Oral BID    sodium chloride flush  10 mL Intravenous 2 times per day    enoxaparin  40 mg Subcutaneous Daily    atorvastatin  80 mg Oral Nightly    clopidogrel  75 mg Oral Daily    levothyroxine  112 mcg Oral Daily    aspirin  81 mg Oral Daily      Infusions:    sodium chloride 50 mL/hr at 10/25/20 0013     PRN Meds: sodium chloride flush, 10 mL, PRN  polyethylene glycol, 17 g, Daily PRN  promethazine, 12.5 mg, Q6H PRN    Or  ondansetron, 4 mg, Q6H PRN  labetalol, 10 mg, Q10 Min PRN        Data    Recent Labs     10/24/20  2000 10/25/20  0808   WBC 8.4 7.0   HGB 14.7 12.6   HCT 43.4 36.6*    279 Recent Labs     10/24/20  2047 10/25/20  0015 10/25/20  0808   * 128* 133*   K 4.3 4.4 3.7   CL 92* 93* 102   CO2 22 21 21   BUN 16 14 10   CREATININE 0.9 0.9 0.7     Recent Labs     10/24/20  2047   AST 28   ALT 18   BILITOT 0.3   ALKPHOS 65     Recent Labs     10/24/20  2000   INR 1.01     No results for input(s): CKTOTAL, CKMB, CKMBINDEX, TROPONINI in the last 72 hours.         Electronically signed by Floyd Welsh MD on 10/25/2020 at 12:11 PM

## 2020-10-25 NOTE — CONSULTS
[address], 9/22/1928, 2105/2105-A, 10/25/2020    Dis[facility]ation: [facility] (eval only)      History:  [place]:  The encounter diagnosis was Cerebrovascular accident (CVA), unspecified mechanism ([address] Subjective:  Patient states: Agreeable to therapy. Pain: Pt denied pain this date  Communication with other providers: PT, RN  Restrictions: General Precautions, Fall Risk    Home Setup/Prior level of function:  Social/Functional History  Lives With: Alone  Type of Home: House  Home Layout: One level, Laundry in basement  Home Access: Stairs to enter with rails  Entrance Stairs - Number of Steps: 5 steps  Bathroom Shower/Tub: Tub/Shower unit  Bathroom Toilet: Handicap height  Bathroom Equipment: Grab bars in shower, Grab bars around toilet  Bathroom Accessibility: [address] [place] [address] Rd: [facility] Help From: Family, Friend(s)  ADL Assistance: Independent  Homemaking Assistance: Needs assistance( comes 3 days/week)  Homemaking Responsibilities: No  Ambulation Assistance: Independent  Transfer Assistance: Independent  Active : No  Patient's  Info: Active  until last week 10/2020    Examination:  · Observation: Supine in bed upon arrival  · Vision: Pt reports impaired vision when reading with slightly decreased peripheral vision on the R side. · [facility]: [facility] [place]  · Vitals: Stable vitals throughout session    Body Systems and functions:  · ROM: WFL   · Strength: 3/5 BUE shoulder flexion. · Sensation: WFL  · Tone: Normal  · Coordination: WFL  · Perception: WNL    Activities of Daily Living (ADLs):  · Feeding: Jose (setup)  · Grooming: CGA (anticipate pt could complete in standing). · UB bathing: SBA (pt educated on use of shower chair and appeared receptive). · LB bathing: SBA (recommend pt complete in seated).    · UB dressing: SBA  · LB dressing: SBA  · Toileting: CGA     Cognitive and Psychosocial Functioning:  · Overall cognitive status: Pt was oriented to self and location. Reports the date as 11/2021. · Affect: Normal     Balance:   · Sitting: Supervision (pt sat EOB demo good dynamic sitting balance). · Standing: CGA (pt stood with RW demo good static balance). Functional Mobility:  · Bed Mobility: Supervision (performed supine to seated bed mobility with VC for sequencing). · Transfers: CGA (STS from EOB with RW). · Ambulation: MCGA (ambulated approx 200ft with RW, VC for RW management, slow pace throughout). AM-PAC 6 click short form for inpatient daily activity:   How much help from another person does the patient currently need. .. Unable  Dep A Lot  Max A A Lot   Mod A A Little  Min A A Little   CGA  SBA None   Mod I  Indep  Sup   1. Putting on and taking off regular lower body clothing? [] 1    [] 2   [] 2   [] 3   [x] 3   [] 4      2. Bathing (including washing, rinsing, drying)? [] 1   [] 2   [] 2 [] 3 [x] 3 [] 4   3. Toileting, which includes using toilet, bedpan, or urinal? [] 1    [] 2   [] 2   [] 3   [x] 3   [] 4     4. Putting on and taking off regular upper body clothing? [] 1   [] 2   [] 2   [] 3   [x] 3    [] 4      5. Taking care of personal grooming such as brushing teeth? [] 1   [] 2    [] 2 [] 3    [x] 3   [] 4      6. Eating meals? [] 1   [] 2   [] 2   [] 3   [] 3   [x] 4      Raw Score:  19     [24=0% impaired(CH), 23=1-19%(CI), 20-22=20-39%(CJ), 15-19=40-59%(CK), 10-14=60-79%(CL), 7-9=80-99%(CM), 6=100%(CN)]     Treatment:  Therapeutic Activity Training:   Therapeutic activity training was instructed today. Cues were given for safety, sequence, UE/LE placement, awareness, and balance. Activities performed today included bed mobility training, sup-sit, sit-stand, SPT.     Safety Measures: Gait belt used, Left in chair, Alarm in place    Assessment:  Assessment  Performance deficits / Impairments: Decreased functional mobility , Decreased vision/visual deficit, Decreased strength, Decreased ADL status, Decreased high-level IADLs  Treatment Diagnosis: Acute CVA  Prognosis: Good  Decision Making: Medium Complexity  REQUIRES OT FOLLOW UP: Yes  Discharge Recommendations: Home with Home health OT    Pt is a 80year old female admitted for Acute CVA. Per chart review, CT SCAN reports findings most consistent with acute infarction of the L parieto-occipital lobe. MRI from 9/20/2020 also showed scattered small acute to subacute infarction in rt occipital lode, left frontal lobe and b/l cerebral lobe likely embolic, she has afib on ekg. Pt reports decreased vision this date with slightly impaired R peripheral. Pt also slightly confused this date. Pt reports that prior to admission she was independent in ADLs, not responsible for IADLs, and Jose for functional mobility. This date, pt demo fair+ ability to ambulate with RW and good insight to her deficits. Pt reports she has good supports at home to assist her PRN. This date pt demo decreased strength, endurance, and slight visual deficits impacting ADL status. Pt HR reached 145 while ambulating with SPO2 in the high 80s, vitals stabilized with rest break in standing. Pt is currently functioning below baseline and would benefit from skilled OT services through [facility] (eval only to ensure home is setup well for visual deficits/WS/EC). Pt educated on WS/EC for ADLs/IADLs, as well as a shower chair to be used while recovering. Plan:  Plan  Times per week: 2+  Times per day: Daily      Goals:  1. Pt will complete all aspects of bed mobility for EOB/OOB ADLs with Jose. 2. Pt will complete UB/LB bathing with SBA and AE as needed. 3. Pt will complete all aspects of LB dressing with supervision and AE as needed. 4. Pt will complete all functional transfers to and from bed, chair, toilet, shower chair with supervision and RW.  5. Pt will ambulate HH distance to bathroom for toileting with supervision and RW. 6. Pt will complete all aspects of toileting task with supervision. 7. Pt will complete oral hygiene/grooming routine in standing at sink with supervision demo good dynamic standing balance for approx 8 minutes. 8. Pt will complete ther ex/ther act with focus on UB strengthening. Time:   Time in: 1044  Time out: 1132  Timed treatment minutes: 38  Total time: 48      Electronically signed by:       NIKKI Jaimes/L, [place], .[id number]

## 2020-10-25 NOTE — CONSULTS
CARDIOLOGY CONSULT NOTE   Reason for consultation:  CVA, afib, RT carotid artery dissection and thrombus    Referring physician:  Param Patel MD     Primary care physician: Tristin Mason MD      Dear Dr. Luis Medina  Thanks for the consult. History of present illness:Deborah is a 80 y. o.year old who  presents with stroke with slurring of speech, mild confusion, and facial droop, getting better, initially contacted [facility] for stroke, however, family did not want to transfer her yesterday, now she had CTA which showed rt CA, cervical region thrombus and carvernous region dissection without complete occlusion, she had MRI of brain which showed scattered small acute to subacute infarction in rt occipital lode, left frontal lobe and b/l cerebral lobe likely embolic, she has afib on ekg. Chief Complaint   Patient presents with    Altered Mental Status      Facial droop and slurred speech      Blood pressure, cholesterol, blood glucose and weight are well controlled. Past medical history:    has a past medical history of Cancer ([address] Hypertension, Osteoporosis, and TIA (transient ischemic attack). Past surgical history:   has a past surgical history that includes Breast surgery. Social History:   reports that she has never smoked. She does not have any smokeless tobacco history on file. She reports that she does not drink alcohol or use drugs.   Family history:   no family history of CAD, STROKE of DM    No Known Allergies    docusate sodium (COLACE) capsule 100 mg, BID  sodium chloride flush 0.9 % injection 10 mL, 2 times per day  sodium chloride flush 0.9 % injection 10 mL, PRN  polyethylene glycol (GLYCOLAX) packet 17 g, Daily PRN  promethazine (PHENERGAN) tablet 12.5 mg, Q6H PRN    Or  ondansetron (ZOFRAN) injection 4 mg, Q6H PRN  enoxaparin (LOVENOX) injection 40 mg, Daily  labetalol (NORMODYNE;TRANDATE) injection 10 mg, Q10 Min PRN  atorvastatin (LIPITOR) tablet 80 mg, Nightly  clopidogrel (PLAVIX) tablet 75 mg, Daily  levothyroxine (SYNTHROID) tablet 112 mcg, Daily  aspirin chewable tablet 81 mg, Daily      Current Facility-Administered Medications   Medication Dose Route Frequency Provider Last Rate Last Dose    docusate sodium (COLACE) capsule 100 mg  100 mg Oral BID Queenie Amaro MD   100 mg at 10/25/20 1027    sodium chloride flush 0.9 % injection 10 mL  10 mL Intravenous 2 times per day MAY Randall - NP   10 mL at 10/25/20 0845    sodium chloride flush 0.9 % injection 10 mL  10 mL Intravenous PRN MAY Randall - CHAD        polyethylene glycol (GLYCOLAX) packet 17 g  17 g Oral Daily PRN MAY Randall - CHAD        promethazine (PHENERGAN) tablet 12.5 mg  12.5 mg Oral Q6H PRN MAY Randall NP        Or    ondansetron (ZOFRAN) injection 4 mg  4 mg Intravenous Q6H PRN MAY Randall - CHAD        enoxaparin (LOVENOX) injection 40 mg  40 mg Subcutaneous Daily MAY Randall - NP   40 mg at 10/25/20 0842    labetalol (NORMODYNE;TRANDATE) injection 10 mg  10 mg Intravenous Q10 Min PRN MAY Randall - CHAD        atorvastatin (LIPITOR) tablet 80 mg  80 mg Oral Nightly MAY Randall NP        clopidogrel (PLAVIX) tablet 75 mg  75 mg Oral Daily MAY Randall - NP   75 mg at 10/25/20 1077    levothyroxine (SYNTHROID) tablet 112 mcg  112 mcg Oral Daily MAY Randall - NP   112 mcg at 10/25/20 0863    aspirin chewable tablet 81 mg  81 mg Oral Daily MAY Randall - NP   81 mg at 10/25/20 9451     Review of Systems:   · Constitutional: No Fever or Weight Loss   · Eyes: No Decreased Vision  · ENT: No Headaches, Hearing Loss or Vertigo  · Cardiovascular: No chest pain, dyspnea on exertion, palpitations or loss of consciousness  · Respiratory: No cough or wheezing    · Gastrointestinal: No abdominal pain, appetite loss, blood in stools, constipation, diarrhea or heartburn  · Genitourinary: No dysuria, trouble voiding, or hematuria  · Musculoskeletal:  No gait disturbance, weakness or joint complaints  · Integumentary: No rash or pruritis  · Neurological: No TIA or stroke symptoms  · Psychiatric: No anxiety or depression  · Endocrine: No malaise, fatigue or temperature intolerance  · Hematologic/Lymphatic: No bleeding problems, blood clots or swollen lymph nodes  · Allergic/Immunologic: No nasal congestion or hives  All systems negative except as marked. ·   ·      Physical Examination:    Vitals:    10/25/20 1102   BP: 137/70   Pulse: 84   Resp: 20   Temp: AFEBRILE   SpO2: 94%      Wt Readings from Last 3 Encounters:   10/25/20 146 lb 2.6 oz (66.3 kg)   09/21/20 154 lb 2 oz (69.9 kg)     Body mass index is 28.55 kg/m². General Appearance:  No distress, conversant    Constitutional:  Well developed, Well nourished, No acute distress, Non-toxic appearance. HENT:  Normocephalic, Atraumatic, Bilateral external ears normal, Oropharynx moist, No oral exudates, Nose normal. Neck- Normal range of motion, No tenderness, Supple, No stridor,no apical-carotid delay, no carotid bruit  Eyes:  PERRL, EOMI, Conjunctiva normal, No discharge. Respiratory:  Normal breath sounds, No respiratory distress, No wheezing, No chest tenderness. ,no use of accessory muscles, diaphragm movement is normal  Cardiovascular: (PMI) apex non displaced,no lifts no thrills, no s3,no s4, Normal heart rate, Normal rhythm, No murmurs, No rubs, No gallops. Carotid arteries pulse and amplitude are normal no bruit, no abdominal bruit noted ( normal abdominal aorta ausculation), femoral arteries pulse and amplitude are normal no bruit, pedal pulses are normal  GI:  Bowel sounds normal, Soft, No tenderness, No masses, No pulsatile masses, no hepatosplenomegally, no bruits  : External genitalia appear normal, No masses or lesions. No discharge. No CVA tenderness. Musculoskeletal:  Intact distal pulses, No edema, No tenderness, No cyanosis, No clubbing. Good range of motion in all major joints. No tenderness to palpation or major deformities noted. Back- No tenderness. Integument:  Warm, Dry, No erythema, No rash. Skin: no rash, no ulcers  Lymphatic:  No lymphadenopathy noted. Neurologic:  Alert & oriented x 3,MILD SLURRING OF SPEECH, MILD FACIAL DROOP,   Psychiatric:  Affect normal, Judgment normal, Mood normal.   Lab Review   Recent Labs     10/25/20  0808   WBC 7.0   HGB 12.6   HCT 36.6*         Recent Labs     10/25/20  0808   *   K 3.7      CO2 21   BUN 10   CREATININE 0.7     Recent Labs     10/24/20  2047   AST 28   ALT 18   BILITOT 0.3   ALKPHOS 65     No results for input(s): TROPONINI in the last 72 hours. No results found for: BNP  Lab Results   Component Value Date    INR 1.01 10/24/2020    PROTIME 12.2 10/24/2020         EKG:AFIB    Chest Xray:    ECHO:PENDING  Labs, echo, meds reviewed  Assessment: 80 y. o.year old with PMH of  has a past medical history of Cancer ([address] Hypertension, Osteoporosis, and TIA (transient ischemic attack). Recommendations:    1. ACUTE CVA:CASE discussed with patient, family, staff, ct sx and neurology, due to dissection in LORENZA at cavernous region and thormbus at cervical region, its recommended to get neuro-intervenal radiologist evaluation for possible cerebral angiogram, aspiration of thrombs and or stent at dissection, will start IV hep for now, and case discussed with hospitalist for transfer to [address] [place] [address], however, her daughter is refusing the transfer and only agrees with IV heparin even if she clinically deteriorate and even if her dissection flap closes and get complete stoke. 2. Afib: START heparin, she will also need YOSELYN  she has b/l cerebral infarctions  3. Htn: stable  4. Critical care 30 mins  5. Health maintenance: exerise and diet  All labs, medications and tests reviewed, continue all other medications of all above medical condition listed as is.          Lorenzo Fall MD, 10/25/2020 12:40 PM

## 2020-10-25 NOTE — PROGRESS NOTES
Notified Dr. Evens Shaw at bedside regarding CT Neck showing carotid dissection, asked about CT consult. Also regarding pt having productive cough this am and ground glass opacities noted on imaging- if pt needs COVID testing.

## 2020-10-25 NOTE — CONSULTS
Jonathan Jolley MD.  Section of General Neurology - Adult  Consult Note        Reason for Consult:    Requesting Physician:  No referring provider defined for this encounter. Thank you for your kind referral.    CHIEF COMPLAINT:  Left facial droop slurred speech         HISTORY OF PRESENT ILLNESS:              The patient is a 80 y.o. female with a history of  female  who presents from home with the above symptoms, onset today. Context is, patient lives independently with frequent assistance from family. Family reported to ED staff they saw with her around 100 pm today and patient seemed at baseline status. Patient reports she laid down for a nap sometime this afternoon, woke around 500 pm and noticed symptoms shortly thereafter, around 520 pm.  She does not believe she woke with symptoms. She states symptoms she noted were left facial droop and slurred speech; she denies noticing any focal weakness or vision changes. ED MD reports calling [facility], who declined T-PA based on patient's wishes after similar admission last month and discussion with patient this week. On exam, patient has slightly slurred speech and left facial droop. No focal weakness noted. Denies vision changes. Called and s/w Mikal Post, who identified self as HCPOA, confirmed no T-pa, provided update, confirmed full code status. She states they will discuss code status with family/patient tomorrow. Patient denies chest pain/pressure, abdominal pain, n/v, diarrhea, recent fever/chills. CT brain CTA head neck was positive for right carotid dissection with thrombus and left PArieto-occipital acute infarct and subacute right frontal left occipital and bialteral cerebellar infarcts on Mri brain 9/2020. pt also was in a fib. pt was also started on heparin by cardiology. pt was on asa and plavix at home and was complaint.     Past Medical History:        Diagnosis Date    [address] ([facility] [address]     Hypertension     Osteoporosis     TIA (transient ischemic attack)      Past Surgical History:        Procedure Laterality Date    BREAST SURGERY       Current Medications:   Current Facility-Administered Medications: docusate sodium (COLACE) capsule 100 mg, 100 mg, Oral, BID  heparin 25,000 units in dextrose 5% 250 mL infusion, 12 Units/kg/hr, Intravenous, Continuous  heparin (porcine) injection 3,980 Units, 60 Units/kg, Intravenous, Once  heparin (porcine) injection 3,980 Units, 60 Units/kg, Intravenous, PRN  heparin (porcine) injection 1,990 Units, 30 Units/kg, Intravenous, PRN  sodium chloride flush 0.9 % injection 10 mL, 10 mL, Intravenous, 2 times per day  sodium chloride flush 0.9 % injection 10 mL, 10 mL, Intravenous, PRN  polyethylene glycol (GLYCOLAX) packet 17 g, 17 g, Oral, Daily PRN  promethazine (PHENERGAN) tablet 12.5 mg, 12.5 mg, Oral, Q6H PRN **OR** ondansetron (ZOFRAN) injection 4 mg, 4 mg, Intravenous, Q6H PRN  labetalol (NORMODYNE;TRANDATE) injection 10 mg, 10 mg, Intravenous, Q10 Min PRN  atorvastatin (LIPITOR) tablet 80 mg, 80 mg, Oral, Nightly  levothyroxine (SYNTHROID) tablet 112 mcg, 112 mcg, Oral, Daily  aspirin chewable tablet 81 mg, 81 mg, Oral, Daily  Allergies:  Patient has no known allergies. Social History:  TOBACCO:   reports that she has never smoked. She does not have any smokeless tobacco history on file. ETOH:   reports no history of alcohol use. DRUGS:   reports no history of drug use. Family History:   No family history on file.     REVIEW OF SYSTEMS:  CONSTITUTIONAL:  negative  HEENT:  negative  RESPIRATORY:  negative  CARDIOVASCULAR:  negative  GASTROINTESTINAL:  negative  GENITOURINARY:  negative  MUSCULOSKELETAL:  negative  BEHAVIOR/PSYCH:  Negative    ROS neg    Family hx neg    PHYSICAL EXAM  ------------------------  Vitals:  BP (!) 100/53   Pulse 98   Temp 97.3 °F (36.3 °C) (Oral)   Resp 19   Ht 5' (1.524 m)   Wt 146 lb 2.6 oz (66.3 kg)   SpO2 93%   BMI 28.55 kg/m²      General:  Awake, alert, oriented X 3.  Well developed, well nourished, well groomed. No apparent distress. HEENT:  Normocephalic, atraumatic. Pupils equal, round, reactive to light. No scleral icterus. No conjunctival injection. Normal lips, teeth, and gums. No nasal discharge. Neck:  Supple  Heart:  RRR, no murmurs, gallops, rubs  Lungs:  CTA bilaterally, bilat symmetrical expansion, no wheeze, rales, or rhonchi  Abdomen: Bowel sounds present, soft, nontender, no masses, no organomegaly, no peritoneal signs  Extremities:  No clubbing, cyanosis, or edema  Skin:  Warm and dry, no open lesions or rash  Breast: deferred  Rectal: deferred  Genitalia:  deferred    NEUROLOGICAL EXAM  ---------------------------------    Mental Status Exam:             Alert and oriented times three,follows commands,speech and language intact    Cranial [id number] Intact.         Cranial nerve II           Visual acuity:  normal                 Cranial nerve III           Pupils:  equal, round, reactive to light      Cranial nerves III, IV, VI           Extraocular Movements: intact      Cranial nerve V           Facial sensation:  intact      Cranial nerve VII           Facial strength: intact      Cranial nerve VIII           Hearing:  intact      Cranial nerve IX           Palate:  intact      Cranial nerve XI         Shoulder shrug:  intact      Cranial nerve XII          Tongue movement:  normal    Motor:    Drift:  absent  Motor exam is symmetrical 5 out of 5 left side-right side 5-/5  Tone:  normal  Abnormal Movements:  Absent    DTRs-1+ biceps,triceps,brachioradialis,knee jerks and ankle jerks bilaterally symmetrical.  Toes-downgoing bilaterally            Sensory:normal sensation              CBC with Differential:    Lab Results   Component Value Date    WBC 6.9 10/25/2020    RBC 4.91 10/25/2020    HGB 14.9 10/25/2020    HCT 43.8 10/25/2020     10/25/2020    MCV 89.2 10/25/2020    MCH 30.3 10/25/2020    MCHC 34.0 10/25/2020    RDW 13.3 10/25/2020 SEGSPCT 71.6 10/24/2020    LYMPHOPCT 14.9 10/24/2020    MONOPCT 10.1 10/24/2020    BASOPCT 0.6 10/24/2020    MONOSABS 0.9 10/24/2020    LYMPHSABS 1.3 10/24/2020    EOSABS 0.2 10/24/2020    BASOSABS 0.1 10/24/2020    DIFFTYPE AUTOMATED DIFFERENTIAL 10/24/2020     CMP:    Lab Results   Component Value Date     10/25/2020    K 3.7 10/25/2020     10/25/2020    CO2 21 10/25/2020    BUN 10 10/25/2020    CREATININE 0.7 10/25/2020    GFRAA >60 10/25/2020    LABGLOM >60 10/25/2020    GLUCOSE 103 10/25/2020    PROT 7.5 10/24/2020    LABALBU 3.8 10/24/2020    CALCIUM 8.3 10/25/2020    BILITOT 0.3 10/24/2020    ALKPHOS 65 10/24/2020    AST 28 10/24/2020    ALT 18 10/24/2020     BMP:    Lab Results   Component Value Date     10/25/2020    K 3.7 10/25/2020     10/25/2020    CO2 21 10/25/2020    BUN 10 10/25/2020    LABALBU 3.8 10/24/2020    CREATININE 0.7 10/25/2020    CALCIUM 8.3 10/25/2020    GFRAA >60 10/25/2020    LABGLOM >60 10/25/2020    GLUCOSE 103 10/25/2020     PT/INR:    Lab Results   Component Value Date    PROTIME 12.2 10/24/2020    INR 1.01 10/24/2020     PTT:    Lab Results   Component Value Date    APTT 31.5 10/25/2020   [APTT  U/A:    Lab Results   Component Value Date    COLORU STRAW 10/24/2020    WBCUA NONE SEEN 10/24/2020    RBCUA 1 10/24/2020    MUCUS RARE 09/20/2020    TRICHOMONAS NONE SEEN 10/24/2020    BACTERIA RARE 10/24/2020    CLARITYU CLEAR 10/24/2020    SPECGRAV 1.031 10/24/2020    LEUKOCYTESUR NEGATIVE 10/24/2020    UROBILINOGEN NORMAL 10/24/2020    BILIRUBINUR NEGATIVE 10/24/2020    BLOODU SMALL 10/24/2020     TSH:  No results found for: TSH  VITAMIN B12: No components found for: B12  FOLATE:    Lab Results   Component Value Date    FOLATE >20.0 09/20/2020     RPR:  No results found for: RPR  BROCK:  No results found for: ANATITER, BROCK  Urine Toxicology:  No components found for: IAMMENTA, IBARBIT, IBENZO, ICOCAINE, IMARTHC, IOPIATES, IPHENCYC     IMPRESSION:    Left Parieto-Occipital infarct-acute    Subacute bilateral cerebellar-left occipital-right frontal infarcts    Right carotid cervical dissection-thrombus    A fib    PLAN:    Mri brain Mra neck    CT brain CTA head neck as above    Heparin ordered by cardiology    Coumadin per hospitalist for 3-6 months    Dc plavix    Continue asa    Discussed dx prognosis mds side effects and above with patients including benefits and risks of using AC and answered all questions. [facility]  For clot retreival and other options were discussed with pt and pt did not want to be transferred and wants to stay here. discussed with pts Cardiologist.discussed with pts nurse. cardiology hospitalist discussed with pts CASANDRA Murphy MD  BOARD CERTIFIED-NEUROLOGY.

## 2020-10-25 NOTE — H&P
History and Physical      Name:  Madeline Jensen /Age/Sex: 1928  (80 y.o. female)   MRN & CSN:  [id number] & [id number] Admission Date/Time: 10/24/2020  7:40 PM   Location:  ED14/ED-14 PCP: Temo Martinez MD       Hospital Day: 1    Discussed patient and POC with Dr. Marvel Palmer and Plan:     Madeline Jensen is a 80 y.o.  female  who presents with stroke-like symptoms    - Stroke-like symptoms/TIA  Left facial droop, slurred speech  CT head: findings suggestive of Left parieto-occip acute infarct  CTA head/neck- pending in ED  HCPOA- declined TPA  Admit to ICU due to acute CVA- close monitoring  Neuro checks Q 4  Check Brain MRI without contrast  Consult to Neuro, CARD- neuro recommended CARD c/s last month  Cont home ASA, plavix, & statin  Had Echo last month- not reordering  Permissive HTN- holding home ARB and BB; labetalol PRN per hospital guidelines for permissive HTN    - Hyponatremia  Chronic, 127  ?  Hypovolemia  Gentle IVF 50/hr  BMP q 6 hrs; goal Na correction not to exceed 2 meq/6 hrs- RN to d/c IVF and notify hospitalist if over-correcting    - Elevated trop  0.036 (new)  Denies CP  Serial trop  Had echo last month  Consult to CARD, as above  Already taking DAPT- continue        Chronic  - HTN- Stable- holding valsartan and nebivolol for permissive HTN  - CVA hx 2020- cont home dapt  - HLD- Cont statin  - Hypothyroidism- Cont synthroid; recent TSH WNL   - Hx of breast ca, s/p bilateral mastectomies      Discussed patient with ED physician    Diet NPO until swallow study; then AAT to    DVT Prophylaxis [x] Lovenox, []  Heparin, [] SCDs, [] Ambulation   GI Prophylaxis [] PPI,  [] H2 Blocker,  [] Carafate,  [] Diet/Tube Feeds   Code Status Full    Disposition Patient requires continued admission due to    MDM [] Low, [] Moderate,[x]  High  Patient's risk as above due to      [] One or more chronic illnesses with exacerbation progression      [x] Two or more stable chronic illnesses      [x] Undiagnosed new problem with uncertain prognosis      [] Elective major surgery      []Prescription drug management     History of Present Illness:     Chief Complaint: left facial droop, slurred speech    Angus Garcia is a 80 y.o.  female  who presents from home with the above symptoms, onset today. Context is, patient lives independently with frequent assistance from family. Family reported to ED staff they saw with her around 100 pm today and patient seemed at baseline status. Patient reports she laid down for a nap sometime this afternoon, woke around 500 pm and noticed symptoms shortly thereafter, around 520 pm.  She does not believe she woke with symptoms. She states symptoms she noted were left facial droop and slurred speech; she denies noticing any focal weakness or vision changes. ED MD reports calling ERICH, who declined T-PA based on patient's wishes after similar admission last month and discussion with patient this week. On exam, patient has slightly slurred speech and left facial droop. No focal weakness noted. Denies vision changes. Called and s/w Giulia Rodrigues, who identified self as HCPOA, confirmed no T-pa, provided update, confirmed full code status. She states they will discuss code status with family/patient tomorrow. Patient denies chest pain/pressure, abdominal pain, n/v, diarrhea, recent fever/chills. Ten point ROS reviewed negative, unless as noted above    Objective:     No intake or output data in the 24 hours ending 10/24/20 2112     Vitals:   Vitals:    10/24/20 1953   BP: (!) 156/106   Pulse: 124   Resp: 16   Temp: 97.6 °F (36.4 °C)   SpO2: 95%       Physical Exam:   GEN Awake female, sitting upright in bed in no apparent distress. Appears given age. EYES Pupils are 3mm and PERRLA bilat. EOMs intact. No scleral erythema, discharge, or conjunctivitis. HENT Mucous membranes are moist. Oral pharynx without exudates, no evidence of thrush.   NECK Supple, no apparent thyromegaly or masses. RESP Clear to auscultation, no wheezes, rales or rhonchi. Symmetric chest movement while on room air. CARDIO/VASC S1/S2 auscultated. Regular rate without appreciable murmurs, rubs, or gallops. No JVD or carotid bruits. Peripheral pulses equal bilaterally and palpable. No peripheral edema. GI Abdomen is soft without significant tenderness, masses, or guarding. Bowel sounds are normoactive. Rectal exam deferred.  No costovertebral angle tenderness. Martin catheter is not present. HEME/LYMPH No palpable cervical lymphadenopathy and no hepatosplenomegaly. No petechiae or ecchymoses. MSK No gross joint deformities. Equal strength bilaterally to upper and lower extremities. 5/5 tested at delt/triceps/biceps/grasps/finger intrinsics/hip/knee/df/pf  SKIN Normal coloration, warm, dry. NEURO Cranial nerves appear grossly intact, slightly slurred speech, left facial droop. no extremity lateralizing weakness. Sensation intact and equal bilaterally. No dysmetria noted with finger/nose bilat. Patient with limited ROM at knees bilat, but able to do heel-to-shin bilaterally with fluidity. PSYCH Awake, alert, oriented x 3. Past Medical History:        Past Medical History:   Diagnosis Date    Cancer ([facility] [address]     Hypertension     Osteoporosis     TIA (transient ischemic attack)        PSHX:  has a past surgical history that includes Breast surgery. Allergies: No Known Allergies    FAM HX: denies known family history    Soc HX:   Social History     Socioeconomic History    Marital status:       Spouse name: Not on file    Number of children: Not on file    Years of education: Not on file    Highest education level: Not on file   Occupational History    Not on file   Social Needs    Financial resource strain: Not on file    Food insecurity     Worry: Not on file     Inability: Not on file    Transportation needs     Medical: Not on file     Non-medical: Not on file   Tobacco the thoracic inlet.  Widening of the   right superior mediastinum was noted which could represent tortuosity of the   right brachiocephalic artery.  Left ventricular enlargement was noted.  Mild   pulmonary vascular redistribution was noted.  No pleural effusion was   identified.  Postoperative changes were noted from bilateral mastectomies. Subtle scarring was identified probably in the lingula.  Degenerative osteo   arthritic changes were noted in both shoulders, marked on the left and   moderate to marked on the right.       Impression:         Left ventricular enlargement was noted with mild pulmonary vascular   redistribution. No pneumonia, interstitial edema or pleural effusion.  Subtle scarring was   identified probably in the lingula. Postoperative changes were noted from bilateral mastectomies.       CTA HEAD NECK W CONTRAST [[id number]]  Resulted: 10/24/20 1949       Order Status: No result  Updated: 10/24/20 1949       CT HEAD WO CONTRAST [[id number]]  Collected: 10/24/20 1938       Order Status: Completed  Updated: 10/24/20 1948       Narrative:         EXAMINATION:   CT OF THE HEAD WITHOUT CONTRAST  10/24/2020 7:27 pm     TECHNIQUE:   CT of the head was performed without the administration of intravenous   contrast. Dose modulation, iterative reconstruction, and/or weight based   adjustment of the mA/kV was utilized to reduce the radiation dose to as low   as reasonably achievable. COMPARISON:   CT head September 20, 2020     HISTORY:   ORDERING SYSTEM PROVIDED HISTORY: stroke alert, facial droop and slurred   speech   TECHNOLOGIST PROVIDED HISTORY:   Has a \"code stroke\" or \"stroke alert\" been called? ->Yes   Reason for exam:->stroke alert, facial droop and slurred speech     FINDINGS:   BRAIN/VENTRICLES: There is no acute intracranial hemorrhage, mass effect or   midline shift.  No abnormal extra-axial fluid collection.  New region of low   attenuation involving the left parieto-occipital

## 2020-10-25 NOTE — CONSULTS
[facility], 9/22/1928, 2105/2105-A, 10/25/2020    History  Hughes:  The encounter diagnosis was Cerebrovascular accident (CVA), unspecified mechanism ([facility] [address] Patient  has a past medical history of Cancer ([facility] [address] Hypertension, Osteoporosis, and TIA (transient ischemic attack). Patient  has a past surgical history that includes Breast surgery. Subjective:  Patient states:  \"I just need somebody to tell me what's going on\". Pain:  Pt denies pain. Communication with other providers:  Handoff to RN, OT  Restrictions: general precautions, fall risk    Home Setup/Prior level of function  Social/Functional History  Lives With: Alone  Type of Home: House  Home Layout: One level, Laundry in basement  Home Access: Stairs to enter with rails  Entrance Stairs - Number of Steps: 5 steps  Bathroom Shower/Tub: Tub/Shower unit  Bathroom Toilet: Handicap height  Bathroom Equipment: Grab bars in shower, Grab bars around toilet  Bathroom Accessibility: [facility] Help From: Family, Friend(s)  ADL Assistance: Independent  Homemaking Assistance: Needs assistance( comes 3 days/week)  Homemaking Responsibilities: No  Ambulation Assistance: Independent  Transfer Assistance: Independent  Active : No  Patient's  Info: Active  until last week 10/2020    Examination of body systems (includes body structures/functions, activity/participation limitations):  · Observation:  Pt supine in bed upon arrival and agreeable to therapy  · Vision:  Slight decreased peripheral vision of R side  · Hearing:  Bilateral hearing aides  · Cardiopulmonary:  No O2 needs  · Cognition: slight decreased command following, slight impulsiveness, see OT/SLP note for further evaluation. Musculoskeletal  · ROM R/L:  WFL.     · Strength R/L:  4/5    · Neuro:  Denies N/T, slight impulsiveness, slight decreased peripheral vision of R side, no unilateral weakness, slight slurred speech, pt reports vision changes when reading small print of \"words running off the page and onto the wall\"      Mobility:  · Rolling L/R:  supervision  · Supine to sit:  Supervision with cues for sequencing  · Transfers: Pt performed STS transfer from bed and to chair CGA with poor eccentric control upon descent and poor technique despite cues for sequencing. · Sitting balance:  good. · Standing balance:  Fair+. · Gait: Pt ambulated 150' CGA with two standing rest breaks with RW and no LOB throughout bout. Pt educated on use of walker with intermittent cues for walker management. Standing rest breaks provided due to increased HR of 145bpm which quickly recovered after standing rest break. [facility] 6 Clicks Inpatient Mobility:  AM-PAC Inpatient Mobility Raw Score : 18    Education: Pt and granddaughter educated on using walker vs no AD in home to increase safety. Education also extensively provided on energy conservation, slowing gait to improve safety, further evaluations of vision impairments if persists, further therapy after discharge, and to not use basement initially upon discharge. Safety: patient left in chair with alarm on, call light within reach, RN notified, gait belt used. Assessment:  Pt is a 80 y.o. female admitted to the hospital for acute cerebrovascular accident. Pt is typically independent at home with ambulation and functional mobility with no AD inside home and cane outside home. Pt is currently ambulating with ' with standing rest breaks CGA and CGA for all transfers. Pt is presenting with slight vision abnormalities, impaired gait, slightly decreased strength, decreased endurance, and impaired balance. Pt would benefit from continued acute care PT as well as [facility] PT upon discharge. Pt has good family support system and  3x per week to check on pt.      Complexity: Moderate  Prognosis: Good, no significant barriers to participation at this time.    Plan Times per week: 4+/week     Equipment: Front wheeled walker    Goals:  Short term goals  Time Frame for Short term goals: 1 week  Short term goal 1: Pt to complete all bed mobility mod I  Short term goal 2: Pt to complete all STS transfers to/from bed, commode, and chair mod I  Short term goal 3: Pt to ambulate 50' LRAD mod I  Short term goal 4: Pt to ambulate 150' with LRAD and supervision  Short term goal 5: Pt to ascend/descecnd 5 steps with supervision to simulate home set up       Treatment plan:  Bed mobility, transfers, balance, gait, TA, TX    Recommendations for NURSING mobility: CGA, RW, gait belt    Time:   Time in: 1045  Time out: 1132  Timed treatment minutes: 38  Total time: 48    Electronically signed by:    Gilda Granger PT  10/25/2020, 1:42 PM

## 2020-10-25 NOTE — PROGRESS NOTES
80-year-old who is being managed for acute CVA, with slurred speech, CT angiogram showing thrombus in the right ICA, and dissection in the cavernous region without complete occlusion, also has A. Fib. Was evaluated by cardiologist, who recommends starting on heparin infusion and transferred to [facility] for possible stent placement at dissection. Spoke with daughter, who is healthcare power of , and granddaughter, Evelia Liang, benefits versus risks discussed, agreeable for Heparin drip, do not want any invasive intervention at this time, no transfer to [facility] for now. They will also speak with patient, and reevaluate if any changes.

## 2020-10-26 NOTE — PROGRESS NOTES
NEUROLOGY NOTE  DR. Brice Varela MD.  -------------------------------------------------  Subjective:    Pt states she is feeling better    Denies any new symptoms    Doing better. Denies any new symptoms. Denies headache nausea vomiting dizziness    Denies numbness weakness extremities    Denies blurring of vision double vision    Objective:    /84   Pulse 100   Temp 98 °F (36.7 °C) (Oral)   Resp 27   Ht 5' (1.524 m)   Wt 149 lb 14.6 oz (68 kg)   SpO2 94%   BMI 29.28 kg/m²   HEENT nl      Neuro exam    Alert Oriented  X 3  Follow simple commands  EOMI Pupils 3 mm vitor  5/5 all 4 extremities      RADIOLOGY  -----------------    Ct Head Wo Contrast    Result Date: 10/24/2020  EXAMINATION: CT OF THE HEAD WITHOUT CONTRAST  10/24/2020 7:27 pm TECHNIQUE: CT of the head was performed without the administration of intravenous contrast. Dose modulation, iterative reconstruction, and/or weight based adjustment of the mA/kV was utilized to reduce the radiation dose to as low as reasonably achievable. COMPARISON: CT head September 20, 2020 HISTORY: ORDERING SYSTEM PROVIDED HISTORY: stroke alert, facial droop and slurred speech TECHNOLOGIST PROVIDED HISTORY: Has a \"code stroke\" or \"stroke alert\" been called? ->Yes Reason for exam:->stroke alert, facial droop and slurred speech FINDINGS: BRAIN/VENTRICLES: There is no acute intracranial hemorrhage, mass effect or midline shift. No abnormal extra-axial fluid collection. New region of low attenuation involving the left parieto-occipital region most consistent with acute infarction. There is no evidence of hydrocephalus. Atherosclerotic changes of the intracranial vasculature. There is moderate periventricular and subcortical white matter hypoattenuation most consistent with microvascular ischemic changes. There is mild age appropriate global cerebral atrophy. Atherosclerotic changes of the intracranial vasculature.  ORBITS: The visualized portion of the orbits demonstrate no acute abnormality. SINUSES: The visualized paranasal sinuses and mastoid air cells demonstrate no acute abnormality. SOFT TISSUES/SKULL:  No acute abnormality of the visualized skull or soft tissues. 1. Findings most consistent with acute infarction of the left parieto-occipital region. No acute intracranial hemorrhage identified. 2. Chronic microvascular ischemic changes and global cerebral atrophy. Critical results were called by Dr. Sagar Aguila MD to Dr. Laxmi Tan on 10/24/2020 at 19:45. Xr Chest Portable    Result Date: 10/24/2020  EXAMINATION: ONE XRAY VIEW OF THE CHEST 10/24/2020 6:37 pm COMPARISON: 09/20/2020 HISTORY: ORDERING SYSTEM PROVIDED HISTORY: stroke alert TECHNOLOGIST PROVIDED HISTORY: Reason for exam:->stroke alert Reason for Exam: stroke alert Acuity: Acute Type of Exam: Initial FINDINGS: Metallic surgical clips were noted at the thoracic inlet. Widening of the right superior mediastinum was noted which could represent tortuosity of the right brachiocephalic artery. Left ventricular enlargement was noted. Mild pulmonary vascular redistribution was noted. No pleural effusion was identified. Postoperative changes were noted from bilateral mastectomies. Subtle scarring was identified probably in the lingula. Degenerative osteo arthritic changes were noted in both shoulders, marked on the left and moderate to marked on the right. Left ventricular enlargement was noted with mild pulmonary vascular redistribution. No pneumonia, interstitial edema or pleural effusion. Subtle scarring was identified probably in the lingula. Postoperative changes were noted from bilateral mastectomies.      Cta Head Neck W Contrast    Result Date: 10/24/2020  EXAMINATION: CTA OF THE HEAD AND NECK WITH CONTRAST, 10/24/2020 7:49 pm TECHNIQUE: CTA of the head and neck was performed with the administration of intravenous contrast. Multiplanar reformatted images are provided for review. MIP images are provided for review. Stenosis of the internal carotid arteries measured using NASCET criteria. Dose modulation, iterative reconstruction, and/or weight based adjustment of the mA/kV was utilized to reduce the radiation dose to as low as reasonably achievable. COMPARISON: 09/20/2020 HISTORY: ORDERING SYSTEM PROVIDED HISTORY: Stroke alert, facial droop and slurred speech. TECHNOLOGIST PROVIDED HISTORY: Reason for exam:  Stroke alert, facial droop and slurred speech. Reason for Exam: Stroke alert, facial droop and slurred speech. Acuity: Acute Type of Exam: Initial FINDINGS: CTA NECK: AORTIC ARCH/ARCH VESSELS: No dissection or arterial injury. No significant stenosis of the brachiocephalic or subclavian arteries. CAROTID ARTERIES: The common carotid arteries are patent. In the midportion of the right internal carotid artery cervical segment along the medial wall is new thrombus which slightly projects into the lumen and causes 50% luminal stenosis. Given that this is completely new since September and associated with an intraluminal projection, this is at high risk for distal embolization. There is also a focal small flap-like filling defect in the lateral aspect of the cavernous segment which is new. The left internal carotid artery is patent. VERTEBRAL ARTERIES: No dissection, arterial injury, or significant stenosis. SOFT TISSUES: Extensive pulmonary ground-glass opacities could represent pneumonia. No neck mass. BONES: No acute osseous abnormality. CTA HEAD: ANTERIOR CIRCULATION: No stenosis or occlusion of the anterior or middle cerebral arteries. POSTERIOR CIRCULATION: No significant stenosis of the vertebral, basilar, or posterior cerebral arteries. No aneurysm. OTHER: No dural venous sinus thrombosis on this non-dedicated study. BRAIN: No mass effect or midline shift. No extra-axial fluid collection. The gray-white differentiation is maintained.      Right internal carotid artery acute intraluminal thrombus and acute dissection without complete vessel occlusion. No intracranial large vessel occlusion. Pulmonary ground-glass opacities could represent pneumonia or pulmonary edema. Critical results were called by Dr. Jose Warner MD to Nikos Keyes on 10/24/2020 at 21:54. LAB RESULTS  --------------------    Recent Results (from the past 24 hour(s))   APTT    Collection Time: 10/25/20 10:30 PM   Result Value Ref Range    aPTT 59.6 (H) 25.1 - 37.1 SECONDS   Homocysteine, Serum    Collection Time: 10/26/20  5:50 AM   Result Value Ref Range    Homocysteine 11.3 (H) 0 - 10 umol/L   APTT    Collection Time: 10/26/20  5:50 AM   Result Value Ref Range    aPTT 50.7 (H) 25.1 - 37.1 SECONDS   Protime-INR    Collection Time: 10/26/20  2:58 PM   Result Value Ref Range    Protime 13.8 11.7 - 14.5 SECONDS    INR 1.14 INDEX   APTT    Collection Time: 10/26/20  2:58 PM   Result Value Ref Range    aPTT 103.1 (H) 25.1 - 37.1 SECONDS         Medical problems    Patient Active Problem List:     Headache     Acute CVA (cerebrovascular accident) ([facility] [address]     Acute cerebrovascular accident ([facility] [address]      ASSESSMENT:  ---------------------    Left Parieto-Occipital infarct-acute     Subacute bilateral cerebellar-left occipital-right frontal infarcts     Right carotid cervical dissection-thrombus     A fib     PLAN:     Mri brain Mra neck     CT brain CTA head neck as above     Heparin ordered by cardiology     Coumadin     Continue asa    Discussed with pts hospitalist.    Pt is getting impatient and anxious on length of care. discussed with pt the course of management and options. pt understood. discussed plan of care with pts nurse.     Electronically signed by Reji Pulliam MD on 10/26/2020 at 7:06 PM

## 2020-10-26 NOTE — PROGRESS NOTES
Called lab in regards to PTT not being resulted- she stated it was misplaced and they would run it now

## 2020-10-26 NOTE — CARE COORDINATION
Cm met with pt to initiate discharge planning. Pt admitted with acute CVA. Pt was also in a month ago for the same. Pt appears alert and oriented. Pt lives alone in a 1 story home with laundry in the basement. Up until 1 month ago pt had been going up and down to basement but family has requested that she not do so at this time. Pt states that she has given up driving within the last month and Cm supported pt decision. Pt requested transportation options and those placed in discharge instructions. Pt has a cane for home use. Cm discussed PT rec for RW and pt declined feeling it would be more cumbersome for her. CM discussed possible HC and HC PT. Pt in agreement. Cm offered choice of HC agencies/list and pt requested CM contact her Fab palomino, fran; [facility]. choice. Pt has a lady who assists her 3x/week with chores/errands etc. Pt plans on discharge to home. CM contacted pt's Fab palomino, and discussed possible HC/HC PT and Cindy in agreement. Cm offered choice of HC agencies and georgette elects to move forward with referral to [facility], [facility].. Sticky note left for Dr peters; possible order for [facility]. at discharge. Perfect Serve message to [facility], [facility]. Katerina hatch RN re; referral. Nate Sharma states that pt has a standard walker at home if needed.

## 2020-10-26 NOTE — PROGRESS NOTES
Hospitalist Progress Note      Name:  Isael Worthington /Age/Sex: 1928  (80 y.o. female)   MRN & CSN:  [id number] & [id number] Admission Date/Time: 10/24/2020  7:40 PM   Location:  -A PCP: Vinod Grossman MD         Hospital Day: 3    Assessment and Plan:   Isael Worthington is a 80 y.o.  female  who presents with <principal problem not specified>    Isael Worthington is a 80 y.o.  female  who presents with slurred speech     · Acute CVA, left parietal -occipital lobe  · Right Carotid cervical dissection with thombus  -CT head: findings suggestive of Left parieto-occip acute infarct  -CTA head/neck-right internal carotid artery with acute intraluminal thrombus, acute dissection without complete vessel occlusion  -Did not get TPA, POA refused  -MRI brain/MRA neck pending  -Permissive hypertension, pressure medications on hold  -Passed swallow evaluation, started on diet  -Cardiology on board, started on heparin, recommended transfer to [facility] for stent placement due to dissection, family not keen on invasive interventions  -Neurology on board, On aspirin and statin, discontinue Plavix, start on Coumadin  -Will overlap with heparin till INR is therapeutic  -Physical therapy on board     · Paroxysmal atrial fibrillation  -Echo pending  -Started on IV metoprolol  -Discussed with cardio, okay to switch to oral  -Cardio on board     · Chronic hyponatremia, mild, improving  -On IV hydration     · Elevated troponin  -Serial troponin stable  -Cardiology on board     Chronic medical conditions  - HTN- Stable- holding valsartan and nebivolol for permissive HTN  - CVA hx 2020-  Cont aspirin, dc plavix, start coumadin  - HLD- Cont statin  - Hypothyroidism- Cont synthroid; recent TSH WNL   - Hx of breast ca, s/p bilateral mastectomies      Disposition, physical therapy on board, anticipate discharge, with therapeutic INR, when medically stable  Diet DIET CARDIAC; No Added Salt (3-4 GM)   DVT Prophylaxis [] Lovenox, [] chloride flush  10 mL Intravenous 2 times per day    atorvastatin  80 mg Oral Nightly    levothyroxine  112 mcg Oral Daily    aspirin  81 mg Oral Daily      Infusions:    heparin (PORCINE) Infusion 14 Units/kg/hr (10/25/20 2320)     PRN Meds: heparin (porcine), 60 Units/kg, PRN  heparin (porcine), 30 Units/kg, PRN  sodium chloride flush, 10 mL, PRN  polyethylene glycol, 17 g, Daily PRN  promethazine, 12.5 mg, Q6H PRN    Or  ondansetron, 4 mg, Q6H PRN  labetalol, 10 mg, Q10 Min PRN        Data    Recent Labs     10/24/20  2000 10/25/20  0808 10/25/20  1320   WBC 8.4 7.0 6.9   HGB 14.7 12.6 14.9   HCT 43.4 36.6* 43.8    279 321      Recent Labs     10/25/20  0015 10/25/20  0808 10/25/20  1320   * 133* 127*   K 4.4 3.7 4.7   CL 93* 102 93*   CO2 21 21 21   BUN 14 10 10   CREATININE 0.9 0.7 0.8     Recent Labs     10/24/20  2047   AST 28   ALT 18   BILITOT 0.3   ALKPHOS 65     Recent Labs     10/24/20  2000   INR 1.01     No results for input(s): CKTOTAL, CKMB, CKMBINDEX, TROPONINI in the last 72 hours.         Electronically signed by Grace Champion MD on 10/26/2020 at 12:01 PM

## 2020-10-26 NOTE — PROGRESS NOTES
Occupational Therapy    Occupational Therapy Treatment Note  Name: Lisbeth Handley MRN: [id number] :   1928   Date:  10/26/2020   Admission Date: 10/24/2020 Room:  [address] [place], [place] [address]   Restrictions/Precautions:    Fall Risk  Communication with other providers:  PT, RN    Subjective:  Patient states:  \"I do not think I need a walker. It will just get in the way at home! \"  Pain: pt denies pain this date. Objective:    Observation:  Pt was received walking to the bed from the bathroom with RN present. Pt not ambulating with AD at time of arrival, slow pace. Objective Measures:  Vitals stable throughout session. Treatment, including education:  Therapeutic Activity Training:   Therapeutic activity training was instructed today. Cues were given for safety, sequence, UE/LE placement, awareness, and balance. Activities performed today included bed mobility training, sup-sit, sit-stand, ambulation. Pt received ambulating from the bathroom to the toilet with no RW present, RN present, slow pace throughout. Pt performed stand to sit with SBA this date. Pt performed seated to supine with supervision. Pt agreeable to therapy and performs supine to seated bed mobility with HOB elevated and SBA for increased time and VC for sequencing. Pt combed hair in seated Jose for setup and increased time. Pt performed STS from EOB with CGA for safety. Pt ambulated approx 300ft+ with RW, demo slight drift to L side and required VC for RW management. Pt ambulated with slow pace overall. Pt returned to room and requested to lay in bed d/t fatigue. Pt performed stand to sit to bed with CGA for safety. Pt performed seated to supine bed mobility with SBA. Pt was left supine in bed, all needs met, alarm in place. Pt reports feeling more stable with RW, however pt continues to refuse use of RW at home and within the community despite education.     Assessment / Impression:     Patient's tolerance of treatment:  Good   Adverse Reaction: None  Significant change in status and impact:  Pt reports, \"my vision is better. Words are no longer running off of the page. \" Pt noted to continue to drift towards L side when ambulating with RW.   Barriers to improvement:  Pt unwilling to utilize RW at home, slightly decreased safety awareness. Plan for Next Session:    Continue with POC    Time in:  1113  Time out: 1138  Timed treatment minutes:  26  Total treatment time:  26    Electronically signed by: Luisa Royal,   10/26/2020, 12:21 PM    Previously filed values:    Goals:  1. Pt will complete all aspects of bed mobility for EOB/OOB ADLs with Jose. 2. Pt will complete UB/LB bathing with SBA and AE as needed. 3. Pt will complete all aspects of LB dressing with supervision and AE as needed. 4. Pt will complete all functional transfers to and from bed, chair, toilet, shower chair with supervision and RW.  5. Pt will ambulate HH distance to bathroom for toileting with supervision and RW. 6. Pt will complete all aspects of toileting task with supervision. 7. Pt will complete oral hygiene/grooming routine in standing at sink with supervision demo good dynamic standing balance for approx 8 minutes. 8. Pt will complete ther ex/ther act with focus on UB strengthening.

## 2020-10-26 NOTE — PROGRESS NOTES
Messaged Dr. Placido Marcus (perfect serve sent to him- stated on call for Dr. Kirill Auguste).  In regards to pt's Afib with HR elevated off/on

## 2020-10-26 NOTE — PROGRESS NOTES
Order placed for pt to be transferred to stepdown.  Will follow stepdown protocols for charting, vitals etc.

## 2020-10-26 NOTE — PROGRESS NOTES
CARDIOLOGY  NOTE        Name:  Roni King /Age/Sex: 1928  (80 y.o. female)   MRN & CSN:  [id number] & [id number] Admission Date/Time: 10/24/2020  7:40 PM   Location:  -A PCP: Lucrecia Schwartz, [address] Day: 3        PLAN FROM CARDIOLOGY FOR TODAY:   HR control      - cardiology consult is for:   afib    -  Interval history:  High HR    · ASSESSMENT/ PLAN:  1. A fib with RVR: anticoag and HR control  2. hyperlipidimea on statin  3. CVA stable  4. elev trop not positive  5. Check echo          Subjective: Todays complain: Patient  No CP, SOB    HPI:  Leola Murray is a 80 y. o.year old who and presents with had concerns including Altered Mental Status ( Facial droop and slurred speech ). Chief Complaint   Patient presents with    Altered Mental Status      Facial droop and slurred speech            Objective: Temperature:  Current - Temp: 98 °F (36.7 °C); Max - Temp  Av.9 °F (36.6 °C)  Min: 97.6 °F (36.4 °C)  Max: 98 °F (36.7 °C)    Respiratory Rate : Resp  Av.4  Min: 14  Max: 34    Pulse Range: Pulse  Av  Min: 77  Max: 132    Blood Presuure Range:  Systolic (95VWU), BXY:604 , Min:93 , XSD:202   ; Diastolic (94KBO), TBZ:61, Min:58, Max:125      Pulse ox Range: SpO2  Av %  Min: 86 %  Max: 97 %    24hr I & O:      Intake/Output Summary (Last 24 hours) at 10/26/2020 1542  Last data filed at 10/26/2020 1200  Gross per 24 hour   Intake 1348.02 ml   Output --   Net 1348.02 ml         /74   Pulse 92   Temp 98 °F (36.7 °C) (Oral)   Resp 17   Ht 5' (1.524 m)   Wt 149 lb 14.6 oz (68 kg)   SpO2 96%   BMI 29.28 kg/m²           Review of Systems:  All 14 systems reviewed, all negative       TELEMETRY: Atrial fibrillation    has a past medical history of Cancer ([address] Hypertension, Osteoporosis, and TIA (transient ischemic attack). has a past surgical history that includes Breast surgery.   Physical Exam:  General:  Awake,   Head:normal  Eye:normal  Neck:  No JVD   Chest:  Clear to auscultation, respiration easy  Cardiovascular:  RRR S1S2  Abdomen:   nontender  Extremities:  tr edema  Pulses; palpable  Neuro: grossly normal    Medications:    metoprolol tartrate  25 mg Oral BID    warfarin  2.5 mg Oral Daily    docusate sodium  100 mg Oral BID    heparin (porcine)  60 Units/kg Intravenous Once    sodium chloride flush  10 mL Intravenous 2 times per day    atorvastatin  80 mg Oral Nightly    levothyroxine  112 mcg Oral Daily    aspirin  81 mg Oral Daily      heparin (PORCINE) Infusion 14 Units/kg/hr (10/25/20 2320)     metoprolol, heparin (porcine), heparin (porcine), sodium chloride flush, polyethylene glycol, promethazine **OR** ondansetron, labetalol    Lab Data:  CBC:   Recent Labs     10/24/20  2000 10/25/20  0808 10/25/20  1320   WBC 8.4 7.0 6.9   HGB 14.7 12.6 14.9   HCT 43.4 36.6* 43.8   MCV 90.6 91.3 89.2    279 321     BMP:   Recent Labs     10/25/20  0015 10/25/20  0808 10/25/20  1320   * 133* 127*   K 4.4 3.7 4.7   CL 93* 102 93*   CO2 21 21 21   BUN 14 10 10   CREATININE 0.9 0.7 0.8     LIVER PROFILE:   Recent Labs     10/24/20  2047   AST 28   ALT 18   BILITOT 0.3   ALKPHOS 65     PT/INR:   Recent Labs     10/24/20  2000 10/26/20  1458   PROTIME 12.2 13.8   INR 1.01 1.14     APTT:   Recent Labs     10/25/20  1320 10/25/20  2230 10/26/20  0550   APTT 31.5 59.6* 50.7*     BNP:  No results for input(s): BNP in the last 72 hours.   TROPONIN: @TROPONINI:3@  Labs, consult, tests reviewed    Assessment/ PLAN:    As above                  Jonathan Mario MD 10/26/2020 3:42 PM

## 2020-10-26 NOTE — PROGRESS NOTES
Speech Language Pathology  Facility/Department: [facility]   CLINICAL BEDSIDE SWALLOW EVALUATION    NAME: Suzanna Chauhan  : 1928  MRN: [id number]    Impression  Dysphagia Diagnosis: Swallow function appears grossly intact  Dysphagia Outcome Severity Scale: Level 7: Normal in all situations     Suzanna Chauhan was seen for a clinical bedside swallow evaluation following admission for left parietal occipital CVA. H/o possible embolic CVAs, HTN, CA. Pt was alert, pleasant, and cooperative and oriented to person, place, situation (DNT time). She followed complex commands for oral motor exam which demonstrated severe left orofacial weakness upon retraction with normal lingual strength, ROM, and coordination. Vocal quality and cough strength were WNL. PO trials of regular and pureed solids and thin liquids by straw completed with normal bolus containment, a-p transit and clearance. Pharyngeal phase appears WNL with 0 s/s aspiration. Laryngeal elevation and swallow timing appear WNL. Speech output was mildly dysarthric characterized by occasional weak targets and 100% intelligibility. Completed education regarding exaggeration of speech sounds for improved output. Normal receptive/expressive language. Cognition screened and judged grossly WNL. Recommend:  Regular diet/Thin liquids.       ADMISSION DATE: 10/24/2020  ADMITTING DIAGNOSIS: has Headache; Acute CVA (cerebrovascular accident) ([facility] [address] and Acute cerebrovascular accident [facility]) on their problem list.  ONSET DATE: 10/24/2020     Recent Chest Xray/CT of Chest:  negative    Date of Eval: 10/26/2020  Evaluating Therapist: Jennifer Marshall    Current Diet level:  Current Diet : Regular  Current Liquid Diet : Thin    Primary Complaint  Patient Complaint: [place] little slow going down\"    Pain:  Pain Assessment  Pain Assessment: 0-10  Pain Level: 0  Patient's Stated Pain Goal: No pain  Response to Pain Intervention: Asleep with RR greater than 10    Reason for Referral  Halima Monroe was referred for a bedside swallow evaluation to assess the efficiency of her swallow function, identify signs and symptoms of aspiration and make recommendations regarding safe dietary consistencies, effective compensatory strategies, and safe eating environment. Treatment Plan  Requires SLP Intervention: No  Duration/Frequency of Treatment: n/a  D/C Recommendations: To be determined       Recommended Diet and Intervention  Diet Solids Recommendation: Regular  Liquid Consistency Recommendation: Thin  Recommended Form of Meds: PO  Recommendations: Self feed       Compensatory Swallowing Strategies  Compensatory Swallowing Strategies: Upright as possible for all oral intake    Treatment/Goals  Short-term Goals  Timeframe for Short-term Goals: n/a  Long-term Goals  Timeframe for Long-term Goals: n/a    General  Chart Reviewed: Yes  Behavior/Cognition: Alert; Cooperative;Pleasant mood  Respiratory Status: Room air  O2 Device: None (Room air)  Communication Observation: Functional;Dysarthria  Follows Directions: Complex  Dentition: Adequate  Patient Positioning: Upright in bed  Baseline Vocal Quality: Normal  Volitional Cough: Strong  Prior Dysphagia History: none  Consistencies Administered: Reg solid; Thin - straw           Vision/Hearing  Vision  Vision: Impaired(pt c/o visual disturbance with reading task)  Hearing  Hearing: Exceptions to [facility]  Hearing Exceptions: Hard of hearing/hearing concerns    Oral Motor Deficits  Oral/Motor  Oral Motor: Exceptions to [facility]  Labial ROM: Reduced left  Labial Symmetry: Abnormal symmetry left  Labial Strength: Reduced  Labial Coordination: Reduced    Oral Phase Dysfunction  Oral Phase  Oral Phase: WNL     Indicators of Pharyngeal Phase Dysfunction   Pharyngeal Phase  Pharyngeal Phase: WNL    Prognosis  Prognosis  Prognosis for safe diet advancement: excellent  Individuals consulted  Consulted and agree with results and recommendations: Patient; Family member  Family member consulted: dorcas    Education  Patient Education: results WNL  Patient Education Response: Demonstrated understanding  Safety Devices in place: Yes  Type of devices: Left in bed; All fall risk precautions in place       Therapy Time  SLP Individual Minutes  Time In: 0920  Time Out: 0041  Minutes: [address] [place], [place]  10/26/2020 9:53 AM

## 2020-10-26 NOTE — PROGRESS NOTES
Physical Therapy Treatment Note  Name: Carlos Leyva MRN: [id number] :   1928   Date:  10/26/2020   Admission Date: 10/24/2020 Room:  [facility]   Restrictions/Precautions:        general precautions, fall risk    Communication with other providers:  RN, OT    Subjective:  Patient states: \"How do you think I'm doing? \"  Pain:   Location, Type, Intensity (0/10 to 10/10): Denies    Objective:    Observation:  Pt standing with RN upon entry and agreeable to therapy    Treatment, including education/measures:    Bed mobility: supine <-> sit supervision     Transfers: Pt performed STS transfer from bed CGA    Gait: pt ambulated 250' with RW CGA with cues for walker management and standing posture. Pt with no LOB, SOB, or dizziness throughout session. HR maintained in stable range throughout bout this session. Balance: pt sat EOB ~8 minutes while providing UE movements with supervision and no LOB throughout bout    Education: Pt educated on use of RW improving safety and decreasing fall risk. Pt stating she feels more stable with RW but refusing to use one at home stating it will \"be an obstruction\". Pt educated on possible use of RW outside of home but pt still refusing RW. Pt educated on tub transfer bench to improve safety but pt refusing as well. Pt with questions about [facility] PT, all questions answered. Assessment / Impression:       Patient's tolerance of treatment:  well   Adverse Reaction: n/a  Significant change in status and impact:  n/a  Barriers to improvement:  Impaired balance, decreased endurance    Plan for Next Session:    Continue to address ambulation, transfer training, and balance in future sessions.      Time in:  1112  Time out:  1138  Timed treatment minutes:  26  Total treatment time:  26    Previously filed items:  Social/Functional History  Lives With: Alone  Type of Home: House  Home Layout: One level, Laundry in basement  Home Access: Stairs to enter with rails  Entrance Stairs - Number of Steps: 5 steps  Bathroom Shower/Tub: Tub/Shower unit  Bathroom Toilet: Handicap height  Bathroom Equipment: Grab bars in shower, Grab bars around toilet  Bathroom Accessibility: Accessible  Home Equipment: [employer] Help From: Family, Friend(s)  ADL Assistance: Independent  Homemaking Assistance: Needs assistance( comes 3 days/week)  Homemaking Responsibilities: No  Ambulation Assistance: Independent  Transfer Assistance: Independent  Active : No  Patient's  Info:  Active  until last week 10/2020  Short term goals  Time Frame for Short term goals: 1 week  Short term goal 1: Pt to complete all bed mobility mod I  Short term goal 2: Pt to complete all STS transfers to/from bed, commode, and chair mod I  Short term goal 3: Pt to ambulate 48' LRAD mod I  Short term goal 4: Pt to ambulate 150' with LRAD and supervision  Short term goal 5: Pt to ascend/descecnd 5 steps with supervision to simulate home set up       Electronically signed by:    Anjel Fajardo PT  10/26/2020, 12:32 PM

## 2020-10-27 NOTE — PROGRESS NOTES
Pt with c/o left arm numbness worsening and slurred speech with slightly worsening facial droop. Griselda Poles NP notified of pt changes, CT head STAT ordered to monitor previously known CVA.

## 2020-10-27 NOTE — PROGRESS NOTES
Called lab at this time regarding APTT that has bit been drawn at this time, lab states phlebotomist will see pt shortly.

## 2020-10-27 NOTE — PROGRESS NOTES
Pt transferred to [facility] [phone]. All belongings, chart, meds and patient taken to new room. Report given, all questions answered at this time. Pt resting comfortably in bed, call light in hand and bed lowered to lowest position.

## 2020-10-27 NOTE — PROGRESS NOTES
the past    MEDICAL DECISION MAKING:  -Labs reviewed  -Imaging reviewed  -Level of risk high  Diet DIET CARDIAC; No Added Salt (3-4 GM)   DVT Prophylaxis [] Lovenox, [x]  Heparin, [] SCDs, [] Ambulation   GI Prophylaxis [] PPI,  [] H2 Blocker,  [] Carafate,  [] Diet/Tube Feeds   Code Status Full Code   Disposition  to be determined   MDM [] Low, [] Moderate,[x]  High     Chief complaint/Interval History/ROS     Chief Complaint: Slurred speech, facial droop    INTERVAL HISTORY: She is found to have a right internal carotid artery dissection with intraluminal thrombus. She is currently on heparin drip. Family members are not interested in transfer to [address] [place] [address] or any neuro intervention at this point. MRI of the brain pending. She continues to have a slurred speech but appears to have improved. She does not have a gross motor deficit. ROS:  No chest pain. No abdominal pain. No nausea. Objective: Intake/Output Summary (Last 24 hours) at 10/27/2020 0940  Last data filed at 10/26/2020 1730  Gross per 24 hour   Intake 1302 ml   Output --   Net 1302 ml      Vitals:   Vitals:    10/27/20 0800   BP: 118/77   Pulse: 108   Resp: (!) 33   Temp: 97.8 °F (36.6 °C)   SpO2: 96%     Physical Exam:   GEN: Awake female, laying flat in bed. Nontoxic-appearing. Answers questions appropriately. Speech is slurred. EYES: No eye discharge. Ocular muscles intact. HENT: Membranes moist.  No nasal discharge. NECK: Supple, no neck mass. RESP: Clear to auscultation bilaterally. CV: Irregularly irregular rhythm. Tachycardic. GI: Abdomen soft. Nontender. Nondistended. : No Martin in place. MSK: No bone fractures. No gross deformities. SKIN: warm, dry, no rashes  NEURO: Cranial nerves appear grossly intact, speech is slurred. No motor deficit. PSYCH: Awake, alert, oriented x4.      Medications:   Medications:    metoprolol tartrate  25 mg Oral BID    warfarin  2.5 mg Oral Daily    docusate sodium  100 mg Oral BID  heparin (porcine)  60 Units/kg Intravenous Once    sodium chloride flush  10 mL Intravenous 2 times per day    atorvastatin  80 mg Oral Nightly    levothyroxine  112 mcg Oral Daily    aspirin  81 mg Oral Daily      Infusions:    heparin (PORCINE) Infusion 16 Units/kg/hr (10/27/20 0249)     PRN Meds: metoprolol, 5 mg, Q6H PRN  heparin (porcine), 60 Units/kg, PRN  heparin (porcine), 30 Units/kg, PRN  sodium chloride flush, 10 mL, PRN  polyethylene glycol, 17 g, Daily PRN  promethazine, 12.5 mg, Q6H PRN    Or  ondansetron, 4 mg, Q6H PRN  labetalol, 10 mg, Q10 Min PRN          Electronically signed by Elizabeth Quinonez MD on 10/27/2020 at 9:40 AM

## 2020-10-27 NOTE — PROGRESS NOTES
Lab called again regarding APTT that has yet to be drawn, they state they will let the phlebotomist know.

## 2020-10-27 NOTE — PROGRESS NOTES
Pt arrived to room 3112 at 1310. Report received from [facility], heparin gtt infusing and dose checked. Pt in bed with no complaints. VSS. A.fib w increased rate at times. Belongings at bedside.

## 2020-10-27 NOTE — PROGRESS NOTES
[facility] radiology called with MRI of brain results, Dr. Desmond Blandon and Gilberto PATIÑO notified.

## 2020-10-27 NOTE — PROGRESS NOTES
Neuro and NIH assessment performed at this time, pt noted to have prominent left sided facial droop, decreased sensation on the L side, complaints of tingling in the LUE, and worsened dysarthria. NIH score increased from 2 to 5.

## 2020-10-27 NOTE — PROGRESS NOTES
CARDIOLOGY  NOTE        Name:  Sil Drake /Age/Sex: 1928  (80 y.o. female)   MRN & CSN:  [id number] & [id number] Admission Date/Time: 10/24/2020  7:40 PM   Location:  -A PCP: Arcelia Horowitz, Katty Muse Day: 4        PLAN FROM CARDIOLOGY FOR TODAY:   Anticoagulate to INR of 2-3, no YOSELYN      - cardiology consult is for:   afib    -  Interval history:  DW family, conservative treatment, no IC interventions planned    · ASSESSMENT/ PLAN:  1. A fib with RVR: anticoag and HR control  2. hyperlipidimea on statin  3. CVA stable  4. elev trop not positive            Subjective: Todays complain: Patient  No CP, SOB    HPI:  Rivera Barton is a 80 y. o.year old who and presents with had concerns including Altered Mental Status ( Facial droop and slurred speech ). Chief Complaint   Patient presents with    Altered Mental Status      Facial droop and slurred speech            Objective: Temperature:  Current - Temp: 97.8 °F (36.6 °C); Max - Temp  Av.9 °F (36.6 °C)  Min: 97.8 °F (36.6 °C)  Max: 98 °F (36.7 °C)    Respiratory Rate : Resp  Av.2  Min: 14  Max: 33    Pulse Range: Pulse  Av.1  Min: 74  Max: 122    Blood Presuure Range:  Systolic (39VZV), FELTON:094 , Min:91 , QXY:824   ; Diastolic (73GBV), XDY:93, Min:58, Max:112      Pulse ox Range: SpO2  Av.4 %  Min: 90 %  Max: 99 %    24hr I & O:      Intake/Output Summary (Last 24 hours) at 10/27/2020 1306  Last data filed at 10/26/2020 1730  Gross per 24 hour   Intake 302 ml   Output --   Net 302 ml         BP 98/76   Pulse 95   Temp 97.8 °F (36.6 °C) (Oral)   Resp 24   Ht 5' (1.524 m)   Wt 149 lb 12.8 oz (67.9 kg)   SpO2 93%   BMI 29.26 kg/m²           Review of Systems:  All 14 systems reviewed, all negative       TELEMETRY: Atrial fibrillation    has a past medical history of Cancer ([facility] [address] Hypertension, Osteoporosis, and TIA (transient ischemic attack).    has a past surgical history that includes Breast surgery. Physical Exam:  General:  Awake,   Head:normal  Eye:normal  Neck:  No JVD   Chest:  Clear to auscultation, respiration easy  Cardiovascular:  RRR S1S2  Abdomen:   nontender  Extremities:  tr edema  Pulses; palpable  Neuro: grossly normal    Medications:    metoprolol tartrate  25 mg Oral BID    warfarin  2.5 mg Oral Daily    docusate sodium  100 mg Oral BID    heparin (porcine)  60 Units/kg Intravenous Once    sodium chloride flush  10 mL Intravenous 2 times per day    atorvastatin  80 mg Oral Nightly    levothyroxine  112 mcg Oral Daily    aspirin  81 mg Oral Daily      heparin (PORCINE) Infusion 16 Units/kg/hr (10/27/20 0249)     metoprolol, heparin (porcine), heparin (porcine), sodium chloride flush, polyethylene glycol, promethazine **OR** ondansetron, labetalol    Lab Data:  CBC:   Recent Labs     10/24/20  2000 10/25/20  0808 10/25/20  1320   WBC 8.4 7.0 6.9   HGB 14.7 12.6 14.9   HCT 43.4 36.6* 43.8   MCV 90.6 91.3 89.2    279 321     BMP:   Recent Labs     10/25/20  0015 10/25/20  0808 10/25/20  1320   * 133* 127*   K 4.4 3.7 4.7   CL 93* 102 93*   CO2 21 21 21   BUN 14 10 10   CREATININE 0.9 0.7 0.8     LIVER PROFILE:   Recent Labs     10/24/20  2047   AST 28   ALT 18   BILITOT 0.3   ALKPHOS 65     PT/INR:   Recent Labs     10/24/20  2000 10/26/20  1458   PROTIME 12.2 13.8   INR 1.01 1.14     APTT:   Recent Labs     10/26/20  0550 10/26/20  1458 10/27/20  0053   APTT 50.7* 103.1* 93.3*     BNP:  No results for input(s): BNP in the last 72 hours.   TROPONIN: @TROPONINI:3@  Labs, consult, tests reviewed    Assessment/ PLAN:    As above                  Trish Cheek MD 10/27/2020 1:06 PM

## 2020-10-27 NOTE — PROGRESS NOTES
Called about results:  Impression:          1. Acute small to moderate volume ischemic infarct in the medial left   occipital lobe with associated petechial hemorrhage. 2. Scattered additional punctate acute ischemic infarcts in the bilateral   frontal and parietal lobes. 3. Distribution of infarcts in multiple vascular territories suggests central   embolic etiology. Perfect serve sent to Dr. Antoni Almanza regarding heparin gtt. He recommended continuing drip benefits outweight risks of rebleed. Recommended repeat CT head in AM which I have ordered. At this time patient demonstrates no significant neuro deficits, has mild slurred speech which she says was present on admission, moving all extremities without difficulty, normal motor strength and sensation. I talked with the patient and updated her on all results, answered all questions. She is in agreement with the plan. I have called and updated granddaughter, Oleg Worthington who is in agreement with plan.     Marcial Arnold PA-C  Hospitalist  10/27/2020, 8:07 PM

## 2020-10-27 NOTE — PROGRESS NOTES
Pt O2 desating at this time to 70-80s but quickly returns to baseline of low 90s. Pt may have some underlying sleep apnea. 2L O2 via nasal cannula placed on pt for the night. Will continue to monitor.

## 2020-10-28 NOTE — PROGRESS NOTES
NEUROLOGY NOTE  DR. Marium Yang MD.  -------------------------------------------------  Subjective:    Pt states she is feeling better    Denies any new symptoms    Doing better. Denies any new symptoms. Denies headache nausea vomiting dizziness    Denies numbness weakness extremities    Denies blurring of vision double vision    Objective:    /79   Pulse 109   Temp 98.9 °F (37.2 °C) (Oral)   Resp 22   Ht 5' (1.524 m)   Wt 149 lb 12.8 oz (67.9 kg)   SpO2 91%   BMI 29.26 kg/m²   HEENT nl      Neuro exam    Alert Oriented  X 3  Follow simple commands  EOMI Pupils 3 mm vitor  5/5 all 4 extremities      RADIOLOGY  -----------------    Ct Head Wo Contrast    Result Date: 10/24/2020  EXAMINATION: CT OF THE HEAD WITHOUT CONTRAST  10/24/2020 7:27 pm TECHNIQUE: CT of the head was performed without the administration of intravenous contrast. Dose modulation, iterative reconstruction, and/or weight based adjustment of the mA/kV was utilized to reduce the radiation dose to as low as reasonably achievable. COMPARISON: CT head September 20, 2020 HISTORY: ORDERING SYSTEM PROVIDED HISTORY: stroke alert, facial droop and slurred speech TECHNOLOGIST PROVIDED HISTORY: Has a \"code stroke\" or \"stroke alert\" been called? ->Yes Reason for exam:->stroke alert, facial droop and slurred speech FINDINGS: BRAIN/VENTRICLES: There is no acute intracranial hemorrhage, mass effect or midline shift. No abnormal extra-axial fluid collection. New region of low attenuation involving the left parieto-occipital region most consistent with acute infarction. There is no evidence of hydrocephalus. Atherosclerotic changes of the intracranial vasculature. There is moderate periventricular and subcortical white matter hypoattenuation most consistent with microvascular ischemic changes. There is mild age appropriate global cerebral atrophy. Atherosclerotic changes of the intracranial vasculature.  ORBITS: The visualized portion of the orbits demonstrate no acute abnormality. SINUSES: The visualized paranasal sinuses and mastoid air cells demonstrate no acute abnormality. SOFT TISSUES/SKULL:  No acute abnormality of the visualized skull or soft tissues. 1. Findings most consistent with acute infarction of the left parieto-occipital region. No acute intracranial hemorrhage identified. 2. Chronic microvascular ischemic changes and global cerebral atrophy. Critical results were called by Dr. Galilea Antoine MD to Dr. Issac Kunz on 10/24/2020 at 19:45. Xr Chest Portable    Result Date: 10/24/2020  EXAMINATION: ONE XRAY VIEW OF THE CHEST 10/24/2020 6:37 pm COMPARISON: 09/20/2020 HISTORY: ORDERING SYSTEM PROVIDED HISTORY: stroke alert TECHNOLOGIST PROVIDED HISTORY: Reason for exam:->stroke alert Reason for Exam: stroke alert Acuity: Acute Type of Exam: Initial FINDINGS: Metallic surgical clips were noted at the thoracic inlet. Widening of the right superior mediastinum was noted which could represent tortuosity of the right brachiocephalic artery. Left ventricular enlargement was noted. Mild pulmonary vascular redistribution was noted. No pleural effusion was identified. Postoperative changes were noted from bilateral mastectomies. Subtle scarring was identified probably in the lingula. Degenerative osteo arthritic changes were noted in both shoulders, marked on the left and moderate to marked on the right. Left ventricular enlargement was noted with mild pulmonary vascular redistribution. No pneumonia, interstitial edema or pleural effusion. Subtle scarring was identified probably in the lingula. Postoperative changes were noted from bilateral mastectomies.      Cta Head Neck W Contrast    Result Date: 10/24/2020  EXAMINATION: CTA OF THE HEAD AND NECK WITH CONTRAST, 10/24/2020 7:49 pm TECHNIQUE: CTA of the head and neck was performed with the administration of intravenous contrast. Multiplanar reformatted images are provided for review. MIP images are provided for review. Stenosis of the internal carotid arteries measured using NASCET criteria. Dose modulation, iterative reconstruction, and/or weight based adjustment of the mA/kV was utilized to reduce the radiation dose to as low as reasonably achievable. COMPARISON: 09/20/2020 HISTORY: ORDERING SYSTEM PROVIDED HISTORY: Stroke alert, facial droop and slurred speech. TECHNOLOGIST PROVIDED HISTORY: Reason for exam:  Stroke alert, facial droop and slurred speech. Reason for Exam: Stroke alert, facial droop and slurred speech. Acuity: Acute Type of Exam: Initial FINDINGS: CTA NECK: AORTIC ARCH/ARCH VESSELS: No dissection or arterial injury. No significant stenosis of the brachiocephalic or subclavian arteries. CAROTID ARTERIES: The common carotid arteries are patent. In the midportion of the right internal carotid artery cervical segment along the medial wall is new thrombus which slightly projects into the lumen and causes 50% luminal stenosis. Given that this is completely new since September and associated with an intraluminal projection, this is at high risk for distal embolization. There is also a focal small flap-like filling defect in the lateral aspect of the cavernous segment which is new. The left internal carotid artery is patent. VERTEBRAL ARTERIES: No dissection, arterial injury, or significant stenosis. SOFT TISSUES: Extensive pulmonary ground-glass opacities could represent pneumonia. No neck mass. BONES: No acute osseous abnormality. CTA HEAD: ANTERIOR CIRCULATION: No stenosis or occlusion of the anterior or middle cerebral arteries. POSTERIOR CIRCULATION: No significant stenosis of the vertebral, basilar, or posterior cerebral arteries. No aneurysm. OTHER: No dural venous sinus thrombosis on this non-dedicated study. BRAIN: No mass effect or midline shift. No extra-axial fluid collection. The gray-white differentiation is maintained.      Right internal carotid artery acute intraluminal thrombus and acute dissection without complete vessel occlusion. No intracranial large vessel occlusion. Pulmonary ground-glass opacities could represent pneumonia or pulmonary edema. Critical results were called by Dr. Jose Warner MD to Nikos Keyes on 10/24/2020 at 21:54. LAB RESULTS  --------------------    Recent Results (from the past 24 hour(s))   APTT    Collection Time: 10/27/20 12:53 AM   Result Value Ref Range    aPTT 93.3 (H) 25.1 - 37.1 SECONDS   APTT    Collection Time: 10/27/20  7:43 PM   Result Value Ref Range    aPTT 117.2 (H) 25.1 - 37.1 SECONDS   Protime-INR    Collection Time: 10/27/20  7:43 PM   Result Value Ref Range    Protime 14.3 11.7 - 14.5 SECONDS    INR 1.18 INDEX         Medical problems    Patient Active Problem List:     Headache     Acute CVA (cerebrovascular accident) ([facility] [address]     Acute cerebrovascular accident ([facility] [address]      ASSESSMENT:  ---------------------    Left occipital subacute infarct with petechial hemorrhage-AC to prevent stroke outweighs risk of bleeding    Left Parieto-Occipital infarct-acute     Subacute bilateral cerebellar-left occipital-right frontal infarcts     Right carotid cervical dissection-thrombus     A fib     PLAN:     Mri brain Mra neck     CT brain CTA head neck as above     Heparin ordered by cardiology     Coumadin     Continue asa    Discussed with pts nurse who is going to discuss with pts POA and hospitlaist on left occipital infarct with petechial hemorrhage and use of AC-benefit of preventing strokes  outweighs bleeding from [facility]. Discussed dx prognosis meds charli effects of meds and above with pt at length with pt and answered all questions. discussed option of transfering to [facility] which pt declined 2 days ago and pt was not inclined for nimesh=ansfer and asked pt to let nurse know if wants to be transferd to [facility]. Delayed late entry note for yesterdays visit.     Electronically signed by Reji Pulliam MD on 10/28/2020 at 12:26 AM

## 2020-10-28 NOTE — PROGRESS NOTES
Occupational Therapy    Occupational Therapy Treatment Note  Name: Pamella Garza MRN: [id number] :   1928   Date:  10/28/2020   Admission Date: 10/24/2020 Room:  [facility]   Restrictions/Precautions:    Fall risk    Communication with other providers:   Cleared for treatment by Selena Scales RN. Subjective:  Patient states:  \"I can do things pretty well\"  After treatment pt states \"who would have known I would have enjoyed my therapy session\"  Pain:   Location, Type, Intensity (0/10 to 10/10): No pain noted    Objective:    Observation:  Pt alert and oriented     Treatment, including education:  Transfers  Supine to sit :Sup  Sit to supine :Sup  Scooting :Sup  Rolling :Sup  Sit to stand :SBA  Stand to sit :SBA  SPT:SBA        Therapeutic Exercise:  Cues were given for technique, safety, recruitment, and rationale. Cues were verbal and/or tactile. For BUE strengthening for ADL & functional mobility Indep pt performed BUE strengthening HEP c 2# hand weights x 20 reps x 6 exercises with Minimal difficulty. Therapeutic Activity Training:   Therapeutic activity training was instructed today. Cues were given for safety, sequence, UE/LE placement, awareness, and balance. Activities performed today included bed mobility training, sup-sit, sit-stand, SPT. Pt completed approx 50' of functional mobility in room with SPC. Pt stood x 5 and 4 min with SBA with SPC to facilitate increased endurance/strength for ADL tasks and transfers. Safety Measures: Gait belt used, Left in bed, Pull/Bed Alarm activated and call light left in reach          Assessment / Impression:        Patient's tolerance of treatment: Good   Adverse Reaction: None  Significant change in status and impact:  None  Barriers to improvement:  Dec strength and endurance. Plan for Next Session:    Continue with POC.     Time in:  1355  Time out:  1455  Timed treatment minutes:  60  Total treatment time:  60  Electronically signed by: PRIMITIVO Cope/L MIL [id number]     10/28/2020, 2:55 PM    Previously filed values:

## 2020-10-28 NOTE — PROGRESS NOTES
Hospitalist Progress Note      Name:  Carlos Leyva /Age/Sex: 1928  (80 y.o. female)   MRN & CSN:  [id number] & [id number] Admission Date/Time: 10/24/2020  7:40 PM   Location:  [facility]/3112 PCP: Kar Mendoza MD         Hospital Day: 5  DISPOSITION: Likely home with home health care  [facility] 17: Heparin drip, awaiting INR to be therapeutic between 2-3. No plans for YOSELYN per cardiology. ASSESSMENT/PLAN:  Carlos Leyva is a 80 y.o.  female who presented to the hospital with left facial droop and slurred speech. CTA head and neck showed right internal carotid artery with intraluminal thrombus and acute dissection without complete vessel occlusion. CT head showed findings most consistent with acute infarction of the left periatrial-occipital region. Slurred speech has improved but continues to have a slurred speech. She was not a TPA candidate. Recommendation for transfer to [facility] declined by family members. I talked to [place] on the morning of 10/27 and discussed transfer again. Family members are not interested in transfer at this point. Acute ischemic stroke----found to have right internal carotid artery dissection with intraluminal thrombus. CT head showed findings consistent with infarction of the left parieto-occipital region. She also had a stroke in 2020. MRI of the brain on 2020 showed scattered small acute to subacute infarctions most pronounced at in the right occipital lobe, minimally in the left frontal lobe and bilateral cerebellar hemisphere and could be embolic. Repeat MRI done on 10/20 showed showed acute small to moderate volume ischemic infarct in the medial left occipital lobe with associated petechial hemorrhage. MRI also showed scattered additional punctate acute ischemic infarct within the bilateral frontal and parietal lobes. Neurology aware of these findings.   Neurology indicated that benefits of anticoagulation outweighs the risk of bleeding at this point. Upon reviewing cardiology notes, there is no plans for YOSELYN at this point.  -Continue heparin drip  -Warfarin, pharmacy to dose  -Daily INR, target INR 2-3  -Lipitor 80 mg daily  -Aspirin  -CT head, discussed with radiology, hopefully to be done within the next 1 to 2 hours  -PT/OT    Paroxysmal A. fib---currently in A. fib. Heart rate  improved compared to yesterday.  -Continue warfarin  -Continue metoprolol 25 twice daily    Hypertension---BP control and also low.  -Continue holding antihypertensives for now    Hypothyroidism---on levothyroxine    Breast cancer---s/p bilateral mastectomy in the past    MEDICAL DECISION MAKING:  -Labs reviewed  -Imaging reviewed  -Level of risk high  Diet DIET CARDIAC; No Added Salt (3-4 GM)   DVT Prophylaxis [] Lovenox, [x]  Heparin, [] SCDs, [] Ambulation   GI Prophylaxis [] PPI,  [] H2 Blocker,  [] Carafate,  [] Diet/Tube Feeds   Code Status Full Code   Disposition  to be determined   MDM [] Low, [] Moderate,[x]  High     Chief complaint/Interval History/ROS     Chief Complaint: Slurred speech, facial droop    INTERVAL HISTORY: MRI of the brain reviewed. She is remains on heparin drip. INR is not therapeutic yet. No plans for transfer. Speech remains slurred but but she can easily be understood. ROS:  No chest pain. No abdominal pain. No nausea. Objective: Intake/Output Summary (Last 24 hours) at 10/28/2020 1129  Last data filed at 10/28/2020 0521  Gross per 24 hour   Intake 130 ml   Output --   Net 130 ml      Vitals:   Vitals:    10/28/20 0956   BP: 103/69   Pulse: 105   Resp:    Temp:    SpO2:      Physical Exam:   GEN: Awake female, toxic appearing. Pleasant. Answers questions appropriate. EYES: No eye discharge. Ocular muscles intact. HENT: Membranes moist.  No nasal discharge. NECK: Supple, no neck mass. RESP: Clear to auscultation bilaterally. CV: Irregularly irregular rhythm. Tachycardic. GI: Abdomen soft. Nontender. Nondistended. : No Martin in place. MSK: No bone fractures. No gross deformities. SKIN: warm, dry, no rashes  NEURO: Cranial nerves appear grossly intact, speech is slurred. No motor deficit. PSYCH: Awake, alert, oriented x4.      Medications:   Medications:    lactulose  20 g Oral TID    metoprolol tartrate  25 mg Oral BID    warfarin  2.5 mg Oral Daily    docusate sodium  100 mg Oral BID    heparin (porcine)  60 Units/kg Intravenous Once    sodium chloride flush  10 mL Intravenous 2 times per day    atorvastatin  80 mg Oral Nightly    levothyroxine  112 mcg Oral Daily    aspirin  81 mg Oral Daily      Infusions:    heparin (PORCINE) Infusion 11 Units/kg/hr (10/28/20 1117)     PRN Meds: labetalol, 10 mg, Q10 Min PRN  metoprolol, 5 mg, Q6H PRN  heparin (porcine), 60 Units/kg, PRN  heparin (porcine), 30 Units/kg, PRN  sodium chloride flush, 10 mL, PRN  polyethylene glycol, 17 g, Daily PRN  promethazine, 12.5 mg, Q6H PRN    Or  ondansetron, 4 mg, Q6H PRN          Electronically signed by Aure Ac MD on 10/28/2020 at 11:29 AM

## 2020-10-28 NOTE — PROGRESS NOTES
Cardiology Progress Note     Today's Plan will sign off and be available if needed    Admit Date:  10/24/2020    Consult reason/ Seen today for: atrial fib with RVR    Subjective and  Overnight Events:  Up sitting in chair - no chest pain or shortness of breath- denies any palpitations    Telemetry Atrial fib     Assessment / Plan / Recommendation:     1. Atrial fib - rate is better controlled with lopressor- CHADVAS 4 recommend anticoagulation if not contraindicated - pharmacy to dose coumadin to INR of 2-3- will hold on YOSELYN/cardioversion as family and patient request conservative measures                    2. Elevated trop- trending down- conservative management- continue with BB and asa  3. CVA- CTA of head and nec concerning for right internal carotid artery with intraluminal thrombus and acute dissection witohout complete vessel occlusion- MRI thrombus/dissection appears less conspicuous- conservative care per family request- declined [facility] - transfer-she is on heparin gtt   4. Hyperlipidemia- continue with statin         History of Presenting Illness:    Chief complain on admission : 80 y. o.year old who is admitted for  Chief Complaint   Patient presents with    Altered Mental Status      Facial droop and slurred speech         Past medical history:    has a past medical history of Cancer ([address] Hypertension, Osteoporosis, and TIA (transient ischemic attack). Past surgical history:   has a past surgical history that includes Breast surgery. Social History:   reports that she has never smoked. She does not have any smokeless tobacco history on file. She reports that she does not drink alcohol or use drugs. Family history:  family history is not on file.     No Known Allergies    Review of Systems:   All 14 systems were reviewed and are negative  Except for the positive findings which are documented     /69   Pulse 105 Temp 98.9 °F (37.2 °C) (Oral)   Resp 24   Ht 5' (1.524 m)   Wt 149 lb 12.8 oz (67.9 kg)   SpO2 95%   BMI 29.26 kg/m²       Intake/Output Summary (Last 24 hours) at 10/28/2020 1100  Last data filed at 10/28/2020 0521  Gross per 24 hour   Intake 130 ml   Output --   Net 130 ml       Physical Exam:  Constitutional:  Well developed, No acute distress  HENT:  Normocephalic, Atraumatic, Bilateral external ears normal,  Nose normal.   Neck- trachea is midline  Eyes:  PEERL, Conjunctiva normal  Respiratory:  decreasedbreath sounds, No respiratory distress, No wheezing, No chest tenderness. Cardiovascular:  IRRR, no murmurs appreciated, No rubs appreciated, No gallops appreciated, JVP not elevated  Abdomen/GI:  Soft, No tenderness  Musculoskeletal:  Intact distal pulses, no edema to lower legs,  No tenderness, No cyanosis, No clubbing. Integument:  Warm, Dry  Lymphatic:  No lymphadenopathy noted.   Neurologic:  Alert & oriented person  Psychiatric:  Affect and Mood :pleasant     Medications:    lactulose  20 g Oral TID    metoprolol tartrate  25 mg Oral BID    warfarin  2.5 mg Oral Daily    docusate sodium  100 mg Oral BID    heparin (porcine)  60 Units/kg Intravenous Once    sodium chloride flush  10 mL Intravenous 2 times per day    atorvastatin  80 mg Oral Nightly    levothyroxine  112 mcg Oral Daily    aspirin  81 mg Oral Daily      heparin (PORCINE) Infusion 11 Units/kg/hr (10/28/20 0621)     labetalol, metoprolol, heparin (porcine), heparin (porcine), sodium chloride flush, polyethylene glycol, promethazine **OR** ondansetron    Lab Data:  CBC:   Recent Labs     10/25/20  1320   WBC 6.9   HGB 14.9   HCT 43.8   MCV 89.2        BMP:   Recent Labs     10/25/20  1320   *   K 4.7   CL 93*   CO2 21   BUN 10   CREATININE 0.8     PT/INR:   Recent Labs     10/26/20  1458 10/27/20  1943 10/28/20  0216   PROTIME 13.8 14.3 15.1*   INR 1.14 1.18 1.25     BNP:  No results for input(s): PROBNP in the last 72 hours. TROPONIN: No results for input(s): TROPONINT in the last 72 hours. Impression:  Active Problems:    Acute cerebrovascular accident [facility])  Resolved Problems:    * No resolved hospital problems. *       All labs, medications and tests reviewed by myself, continue all other medications of all above medical condition listed as is except for changes mentioned above. Thank you   Please call with questions. Electronically signed by MAY Nelson CNP on 10/28/2020 at 11:00 AM  I have seen ,spoken to  and examined this patient personally, independently of the nurse practitioner. I have reviewed the hospital care given to date and reviewed all pertinent labs and imaging. The plan was developed mutually at the time of the visit with the patient,  NP  and myself. I have spoken with patient, nursing staff and provided written and verbal instructions . The above note has been reviewed and I agree with the assessment, diagnosis, and treatment plan with changes made by me as follows     CARDIOLOGY ATTENDING ADDENDUM    HPI:  I have reviewed the above HPI  And agree with above   Donaldo Tejada is a 80 y. o.year old who and presents with had concerns including Altered Mental Status ( Facial droop and slurred speech ). Chief Complaint   Patient presents with    Altered Mental Status      Facial droop and slurred speech      Interval history:  Dw  daughter    Physical Exam:  General:  Awake, alert, NAD  Head:normal  Eye:normal  Neck:  No JVD   Chest:  Clear to auscultation, respiration easy  Cardiovascular:  RRR S1S2  Abdomen:   nontender  Extremities:  tr edema  Pulses; palpable  Neuro: grossly normal      MEDICAL DECISION MAKING;    I agree with the above plan, which was planned by myself and discussed with NP.   Conservative treatment per family  Can dc once INR therapeutic        Tanvir Cisneros MD [facility] [place]

## 2020-10-29 NOTE — PROGRESS NOTES
Occupational Therapy  . Occupational Therapy Treatment Note  Name: Cherelle Smith MRN: [id number] :   1928   Date:  10/29/2020   Admission Date: 10/24/2020 Room:  [facility]112-A   Restrictions/Precautions:    General Precautions; Fall risk    Communication with other providers:  Per chart review and Nurse Jackson, patient is appropriate for therapeutic intervention. Nurse reports patient does not like to get up in chair but if willing would be good to sit up for dinner. Subjective:  Patient states:  \"No, I will not sit in that chair. \" Pt described why chair was ill fitting, was not receptive to use of pillow support at back to adjust for comfort. Pain:   Location, Type, Intensity (0/10 to 10/10):  0/10    Objective:    Observation:  Pt received in semi-fowlers in bed. Objective Measures:  Telemetry, HR 90, RR 18    Treatment, including education:  Therapeutic Activity Training:   Therapeutic activity training was instructed today. Cues were given for safety, sequence, UE/LE placement, awareness, and balance. Activities performed today included bed mobility training, sup-sit, sit-stand, SPT. Supine<>sit: Sup + bed features  Scooting: Sup  Sit<>stand: SBA  SPT: See Toilet Transfer  Functional Mobility: SBA w/o AD ~25 ft inside room    Self Care Training:   Cues were given for safety, sequence, UE/LE placement, visual cues, and balance. Activities performed today included toileting, hand hygiene, and grooming. Toilet Transfer: SBA w/o AD + use of grab bar  Toileting: SBA while performing clothing mgmt up / down, performed seated hygiene  Grooming: SBA in stance at sink    All therapeutic intervention performed c emphasis on toileting ADL, dynamic balance / standing tolerance to inc strength, endurance and act tolerance for inc Indep c ADL tasks, func transfers / mobility. Safety  Patient safely in bed + alarm at end of session, with call light/phone in reach, and nursing aware.  Gait belt was used for func transfers / mobility. Assessment / Impression:        Patient's tolerance of treatment:  Wellf   Adverse Reaction: None  Significant change in status and impact:  None  Barriers to improvement:  Decreased strength    Plan for Next Session:    Continue per OT POC until discharge    Time in:  1554  Time out:  1624  Timed treatment minutes:  30  Total treatment time:  30    Electronically signed by:    FREDDIE Morris  10/29/2020, 4:02 PM    Previously filed values:    Goals:  1. Pt will complete all aspects of bed mobility for EOB/OOB ADLs with Jose. 2. Pt will complete UB/LB bathing with SBA and AE as needed. 3. Pt will complete all aspects of LB dressing with supervision and AE as needed. 4. Pt will complete all functional transfers to and from bed, chair, toilet, shower chair with supervision and RW.  5. Pt will ambulate HH distance to bathroom for toileting with supervision and RW. 6. Pt will complete all aspects of toileting task with supervision. 7. Pt will complete oral hygiene/grooming routine in standing at sink with supervision demo good dynamic standing balance for approx 8 minutes. 8. Pt will complete ther ex/ther act with focus on UB strengthening.

## 2020-10-29 NOTE — PROGRESS NOTES
orbits demonstrate no acute abnormality. SINUSES: The visualized paranasal sinuses and mastoid air cells demonstrate no acute abnormality. SOFT TISSUES/SKULL:  No acute abnormality of the visualized skull or soft tissues. 1. Findings most consistent with acute infarction of the left parieto-occipital region. No acute intracranial hemorrhage identified. 2. Chronic microvascular ischemic changes and global cerebral atrophy. Critical results were called by Dr. Mateusz Cho MD to Dr. Buddy Block on 10/24/2020 at 19:45. Xr Chest Portable    Result Date: 10/24/2020  EXAMINATION: ONE XRAY VIEW OF THE CHEST 10/24/2020 6:37 pm COMPARISON: 09/20/2020 HISTORY: ORDERING SYSTEM PROVIDED HISTORY: stroke alert TECHNOLOGIST PROVIDED HISTORY: Reason for exam:->stroke alert Reason for Exam: stroke alert Acuity: Acute Type of Exam: Initial FINDINGS: Metallic surgical clips were noted at the thoracic inlet. Widening of the right superior mediastinum was noted which could represent tortuosity of the right brachiocephalic artery. Left ventricular enlargement was noted. Mild pulmonary vascular redistribution was noted. No pleural effusion was identified. Postoperative changes were noted from bilateral mastectomies. Subtle scarring was identified probably in the lingula. Degenerative osteo arthritic changes were noted in both shoulders, marked on the left and moderate to marked on the right. Left ventricular enlargement was noted with mild pulmonary vascular redistribution. No pneumonia, interstitial edema or pleural effusion. Subtle scarring was identified probably in the lingula. Postoperative changes were noted from bilateral mastectomies.      Cta Head Neck W Contrast    Result Date: 10/24/2020  EXAMINATION: CTA OF THE HEAD AND NECK WITH CONTRAST, 10/24/2020 7:49 pm TECHNIQUE: CTA of the head and neck was performed with the administration of intravenous contrast. Multiplanar reformatted images are provided for review. MIP images are provided for review. Stenosis of the internal carotid arteries measured using NASCET criteria. Dose modulation, iterative reconstruction, and/or weight based adjustment of the mA/kV was utilized to reduce the radiation dose to as low as reasonably achievable. COMPARISON: 09/20/2020 HISTORY: ORDERING SYSTEM PROVIDED HISTORY: Stroke alert, facial droop and slurred speech. TECHNOLOGIST PROVIDED HISTORY: Reason for exam:  Stroke alert, facial droop and slurred speech. Reason for Exam: Stroke alert, facial droop and slurred speech. Acuity: Acute Type of Exam: Initial FINDINGS: CTA NECK: AORTIC ARCH/ARCH VESSELS: No dissection or arterial injury. No significant stenosis of the brachiocephalic or subclavian arteries. CAROTID ARTERIES: The common carotid arteries are patent. In the midportion of the right internal carotid artery cervical segment along the medial wall is new thrombus which slightly projects into the lumen and causes 50% luminal stenosis. Given that this is completely new since September and associated with an intraluminal projection, this is at high risk for distal embolization. There is also a focal small flap-like filling defect in the lateral aspect of the cavernous segment which is new. The left internal carotid artery is patent. VERTEBRAL ARTERIES: No dissection, arterial injury, or significant stenosis. SOFT TISSUES: Extensive pulmonary ground-glass opacities could represent pneumonia. No neck mass. BONES: No acute osseous abnormality. CTA HEAD: ANTERIOR CIRCULATION: No stenosis or occlusion of the anterior or middle cerebral arteries. POSTERIOR CIRCULATION: No significant stenosis of the vertebral, basilar, or posterior cerebral arteries. No aneurysm. OTHER: No dural venous sinus thrombosis on this non-dedicated study. BRAIN: No mass effect or midline shift. No extra-axial fluid collection. The gray-white differentiation is maintained.      Right internal carotid artery acute intraluminal thrombus and acute dissection without complete vessel occlusion. No intracranial large vessel occlusion. Pulmonary ground-glass opacities could represent pneumonia or pulmonary edema. Critical results were called by Dr. Will Zimmerman MD to Codi Sam on 10/24/2020 at 21:54. LAB RESULTS  --------------------    Recent Results (from the past 24 hour(s))   APTT    Collection Time: 10/28/20  9:22 AM   Result Value Ref Range    aPTT 72.8 (H) 25.1 - 37.1 SECONDS   APTT    Collection Time: 10/28/20  3:44 PM   Result Value Ref Range    aPTT 66.2 (H) 25.1 - 37.1 SECONDS   APTT    Collection Time: 10/28/20 10:51 PM   Result Value Ref Range    aPTT 61.6 (H) 25.1 - 37.1 SECONDS         Medical problems    Patient Active Problem List:     Headache     Acute CVA (cerebrovascular accident) ([facility] [address]     Acute cerebrovascular accident ([facility] [address]      ASSESSMENT:  ---------------------    Left occipital subacute infarct with petechial hemorrhage-AC to prevent stroke outweighs risk of bleeding    Left Parieto-Occipital infarct-acute     Subacute bilateral cerebellar-left occipital-right frontal infarcts     Right carotid cervical dissection-thrombus     A fib     PLAN:     Mri brain Mra neck     CT brain CTA head neck as above     Heparin ordered by cardiology     Coumadin     Continue asa    Discussed with pts nurse who is going to discuss with pts POA and hospitlaist on left occipital infarct with petechial hemorrhage and use of AC-benefit of preventing strokes  outweighs bleeding from [facility]. Discussed dx prognosis meds charli effects of meds and above with pt at length with pt and answered all questions. discussed option of transfering to [address] [place] [address] which pt declined 2 days ago and pt was not inclined for nimesh=ansfer and asked pt to let nurse know if wants to be transferd to [address] [place] [address]. Delayed late entry note for yesterdays visit.     Electronically signed by Balaji Harris MD on 10/29/2020 at 2:16 AM

## 2020-10-29 NOTE — PROGRESS NOTES
Physical Therapy    Physical Therapy Treatment Note  Name: Kumar Landaverde MRN: [id number] :   1928   Date:  10/29/2020   Admission Date: 10/24/2020 Room:  [address] [place] [address]   Restrictions/Precautions:        diagnosis was Cerebrovascular accident, fall risk  Communication with other providers:  Per nurse ok to tx  Subjective:  Patient states: pt is very talkative and pleasant. Pt motivated for therapy tx but declined using rw. Pt c/o no one will take time to listen and discuss her situation. This therapist attempted to be very supportive and listen. Pain:   Location, Type, Intensity (0/10 to 10/10): Pt reports having 2 bad knees but did not rate pain. Objective:    Observation:  Alert and oriented. During tx pt was very open to eduction and explanation and did thank this therapist at end of tx session. Treatment, including education/measures:  Sit<=>stand from bed, chair, and commode sba  amb sba with st. cane 10' x 2, 120' sba with several standing rest. Pt has very slow gt and unsteady x 1 with turning but recovered without assist. Pt was able to read numbers on doors and find her room. Ex in sitting: trunk stretches with shoulder flex and cues for deep breathing followed by aps and circles. Pt declined staying up in chair. Safety  Patient left safely in the bed, with call light/phone in reach with alarm applied. Gait belt was used for transfers and gait. Assessment / Impression:       Patient's tolerance of treatment:  good  Adverse Reaction: na  Significant change in status and impact:  na  Barriers to improvement:  none  Plan for Next Session:    Cont.  POC  Time in:  1055  Time out:  1155  Timed treatment minutes:  60  Total treatment time:  61    Previously filed items:  Social/Functional History  Lives With: Alone  Type of Home: House  Home Layout: One level, Laundry in basement  Home Access: Stairs to enter with rails  Entrance Stairs - Number of Steps: 5 steps  Bathroom Shower/Tub: Tub/Shower unit  Bathroom Toilet: Handicap height  Bathroom Equipment: Grab bars in shower, Grab bars around toilet  Bathroom Accessibility: Accessible  Home Equipment: Marc Abu Help From: Family, Friend(s)  ADL Assistance: [address]: Needs assistance( comes 3 days/week)  Homemaking Responsibilities: No  Ambulation Assistance: Independent  Transfer Assistance: Independent  Active : No  Patient's  Info:  Active  until last week 10/2020  Short term goals  Time Frame for Short term goals: 1 week  Short term goal 1: Pt to complete all bed mobility mod I  Short term goal 2: Pt to complete all STS transfers to/from bed, commode, and chair mod I  Short term goal 3: Pt to ambulate 48' LRAD mod I  Short term goal 4: Pt to ambulate 150' with LRAD and supervision  Short term goal 5: Pt to ascend/descecnd 5 steps with supervision to simulate home set up       Electronically signed by:    Collin Schultz PTA  10/29/2020, 8:53 AM

## 2020-10-29 NOTE — PROGRESS NOTES
Hospitalist Progress Note      Name:  Fernandez Samaniego /Age/Sex: 1928  (80 y.o. female)   MRN & CSN:  [id number] & [id number] Admission Date/Time: 10/24/2020  7:40 PM   Location:  [facility]12[facility] PCP: Tristin Mason MD         Hospital Day: 6  DISPOSITION: Likely home with home health care  [facility] 17: Heparin drip, awaiting INR to be therapeutic between 2-3. ASSESSMENT/PLAN:  Fernandez Samaniego is a 80 y.o.  female who presented to the hospital with left facial droop and slurred speech. CTA head and neck showed right internal carotid artery with intraluminal thrombus and acute dissection without complete vessel occlusion. CT head showed findings most consistent with acute infarction of the left periatrial-occipital region. Slurred speech has improved but continues to have a slurred speech. She was not a TPA candidate. Recommendation for transfer to [facility] declined by family members. I talked to [place] on the morning of 10/27 and discussed transfer again. Family members are not interested in transfer at this point. Acute ischemic stroke----found to have right internal carotid artery dissection with intraluminal thrombus. CT head showed findings consistent with infarction of the left parieto-occipital region. She also had a stroke in 2020. MRI of the brain on 2020 showed scattered small acute to subacute infarctions most pronounced at in the right occipital lobe, minimally in the left frontal lobe and bilateral cerebellar hemisphere and could be embolic. Repeat MRI done on 10/20 showed showed acute small to moderate volume ischemic infarct in the medial left occipital lobe with associated petechial hemorrhage. MRI also showed scattered additional punctate acute ischemic infarct within the bilateral frontal and parietal lobes. Neurology aware of these findings.   Neurology indicated that benefits of anticoagulation outweighs the risk of bleeding at this point.  Upon reviewing cardiology notes, there is no plans for YOSELYN at this point.  -Continue heparin drip  -Warfarin, pharmacy to dose  -Daily INR, target INR 2-3  -Lipitor 80 mg daily  -Aspirin    Paroxysmal A. fib---currently in A. fib. Heart rate  improved compared to yesterday.  -Continue warfarin  -Continue metoprolol 25 twice daily    Hypertension---BP controlled  -Lopressor 25 twice daily    Hypothyroidism---on levothyroxine    Breast cancer---s/p bilateral mastectomy in the past    MEDICAL DECISION MAKING:  -Labs reviewed  -Imaging reviewed  -Level of risk high  Diet DIET CARDIAC; No Added Salt (3-4 GM)   DVT Prophylaxis [] Lovenox, [x]  Heparin, [] SCDs, [] Ambulation   GI Prophylaxis [] PPI,  [] H2 Blocker,  [] Carafate,  [] Diet/Tube Feeds   Code Status Full Code   Disposition  home   MDM [] Low, [] Moderate,[x]  High     Chief complaint/Interval History/ROS     Chief Complaint: Slurred speech, facial droop    INTERVAL HISTORY: Clinically stable. Awaiting INR to be therapeutic       ROS:  No chest pain. No abdominal pain. No nausea. Objective: Intake/Output Summary (Last 24 hours) at 10/29/2020 1018  Last data filed at 10/29/2020 0500  Gross per 24 hour   Intake 240 ml   Output --   Net 240 ml      Vitals:   Vitals:    10/29/20 0930   BP: 131/83   Pulse: 120   Resp: 18   Temp:    SpO2: 95%     Physical Exam:   GEN: Awake female, toxic appearing. Pleasant. Answers questions appropriate. EYES: No eye discharge. Ocular muscles intact. HENT: Membranes moist.  No nasal discharge. NECK: Supple, no neck mass. RESP: Clear to auscultation bilaterally. CV: Irregularly irregular rhythm. No pitting lower extremity edema. GI: Abdomen soft. Nontender. Nondistended. : No Martin in place. MSK: No bone fractures. No gross deformities. SKIN: warm, dry, no rashes  NEURO: Cranial nerves appear grossly intact, speech is slurred. No motor deficit. PSYCH: Awake, alert, oriented x4.      Medications: Medications:    lactulose  20 g Oral TID    metoprolol tartrate  25 mg Oral BID    warfarin  2.5 mg Oral Daily    docusate sodium  100 mg Oral BID    heparin (porcine)  60 Units/kg Intravenous Once    sodium chloride flush  10 mL Intravenous 2 times per day    atorvastatin  80 mg Oral Nightly    levothyroxine  112 mcg Oral Daily    aspirin  81 mg Oral Daily      Infusions:    heparin (PORCINE) Infusion 11.011 Units/kg/hr (10/29/20 0546)     PRN Meds: labetalol, 10 mg, Q10 Min PRN  metoprolol, 5 mg, Q6H PRN  heparin (porcine), 60 Units/kg, PRN  heparin (porcine), 30 Units/kg, PRN  sodium chloride flush, 10 mL, PRN  polyethylene glycol, 17 g, Daily PRN  promethazine, 12.5 mg, Q6H PRN    Or  ondansetron, 4 mg, Q6H PRN          Electronically signed by Zoila Martinez MD on 10/29/2020 at 10:18 AM

## 2020-10-29 NOTE — PROGRESS NOTES
PHARMACY ANTICOAGULATION MONITORING SERVICE    Suzanna Chauhan is a 80 y.o. female on warfarin therapy for R carotid cervical dissection with thrombus; A-fib. Pharmacy consulted by Dr. Radha Etienne for monitoring and adjustment of treatment. Indication for anticoagulation: R carotid cervical dissection with thrombus; A-fib  INR goal: 2-3  Warfarin dose prior to admission: none    Pertinent Laboratory Values   Recent Labs     10/29/20  0431   INR 1.19   HGB 12.9   HCT 36.9*        INR MONITORING  Recent Labs     10/27/20  1943 10/28/20  0216 10/29/20  0431   INR 1.18 1.25 1.19     Assessment/Plan:   Possible DDI's:  o Aspirin 81 mg daily ordered (lower dose than home)  o Atorvastatin (home med) can increase the effects of warfarin  o Heparin bridging until INR is therapeutic  o Levothyroxine (home medication) can increase effects of warfarin  o Prior to inpatient admission, receiving clopidogrel which has now been discontinued (last dose 10/27)   Bleeding Risks:  o Age > 72  o Clinically appropriate dual therapy with other anticoagulants/antiplatelets   INR remains low after 3 doses of 2.5mg, no signs of upward movement.  Will increase to warfarin 5mg daily until INR starts to trend upward. [address] [place] [address] will continue to monitor and adjust warfarin therapy as indicated    Thank you for the consult. Sergio Gitelman Georgie Romano  3:36 PM  10/29/20

## 2020-10-29 NOTE — PLAN OF CARE
Problem: Falls - Risk of:  Goal: Will remain free from falls  Description: Will remain free from falls  10/29/2020 0838 by Veena Hawk  Outcome: Ongoing  10/29/2020 0835 by Adam Valero RN  Outcome: Ongoing  Goal: Absence of physical injury  Description: Absence of physical injury  10/29/2020 0838 by Veena Hawk  Outcome: Ongoing  10/29/2020 0835 by Adam Valero RN  Outcome: Ongoing     Problem: HEMODYNAMIC STATUS  Goal: Patient has stable vital signs and fluid balance  10/29/2020 0838 by Veena Hawk  Outcome: Ongoing  10/29/2020 0835 by Adam Valero RN  Outcome: Ongoing     Problem: ACTIVITY INTOLERANCE/IMPAIRED MOBILITY  Goal: Mobility/activity is maintained at optimum level for patient  10/29/2020 0838 by Veena Hawk  Outcome: Ongoing  10/29/2020 0835 by Adam Valero RN  Outcome: Ongoing     Problem: COMMUNICATION IMPAIRMENT  Goal: Ability to express needs and understand communication  10/29/2020 0838 by Veena Hawk  Outcome: Ongoing  10/29/2020 0835 by Adam Valero RN  Outcome: Ongoing

## 2020-10-30 NOTE — PROGRESS NOTES
NEUROLOGY NOTE  DR. Janeen Alford MD.  -------------------------------------------------  Subjective:    pts CT brain was positive for left parieto-occipital infarct with petechial hemorrhage    Pt states she is feeling better    Denies any new symptoms    Doing better. Denies any new symptoms. Denies headache nausea vomiting dizziness    Denies numbness weakness extremities    Denies blurring of vision double vision    Objective:    /89   Pulse 129   Temp 98.2 °F (36.8 °C) (Oral)   Resp (!) 34   Ht 5' (1.524 m)   Wt 149 lb 12.8 oz (67.9 kg)   SpO2 95%   BMI 29.26 kg/m²   HEENT nl      Neuro exam    Alert Oriented  X 3  Follow simple commands  EOMI Pupils 3 mm vitor  5/5 all 4 extremities      RADIOLOGY  -----------------    Ct Head Wo Contrast    Result Date: 10/24/2020  EXAMINATION: CT OF THE HEAD WITHOUT CONTRAST  10/24/2020 7:27 pm TECHNIQUE: CT of the head was performed without the administration of intravenous contrast. Dose modulation, iterative reconstruction, and/or weight based adjustment of the mA/kV was utilized to reduce the radiation dose to as low as reasonably achievable. COMPARISON: CT head September 20, 2020 HISTORY: ORDERING SYSTEM PROVIDED HISTORY: stroke alert, facial droop and slurred speech TECHNOLOGIST PROVIDED HISTORY: Has a \"code stroke\" or \"stroke alert\" been called? ->Yes Reason for exam:->stroke alert, facial droop and slurred speech FINDINGS: BRAIN/VENTRICLES: There is no acute intracranial hemorrhage, mass effect or midline shift. No abnormal extra-axial fluid collection. New region of low attenuation involving the left parieto-occipital region most consistent with acute infarction. There is no evidence of hydrocephalus. Atherosclerotic changes of the intracranial vasculature. There is moderate periventricular and subcortical white matter hypoattenuation most consistent with microvascular ischemic changes. There is mild age appropriate global cerebral atrophy. with the administration of intravenous contrast. Multiplanar reformatted images are provided for review. MIP images are provided for review. Stenosis of the internal carotid arteries measured using NASCET criteria. Dose modulation, iterative reconstruction, and/or weight based adjustment of the mA/kV was utilized to reduce the radiation dose to as low as reasonably achievable. COMPARISON: 09/20/2020 HISTORY: ORDERING SYSTEM PROVIDED HISTORY: Stroke alert, facial droop and slurred speech. TECHNOLOGIST PROVIDED HISTORY: Reason for exam:  Stroke alert, facial droop and slurred speech. Reason for Exam: Stroke alert, facial droop and slurred speech. Acuity: Acute Type of Exam: Initial FINDINGS: CTA NECK: AORTIC ARCH/ARCH VESSELS: No dissection or arterial injury. No significant stenosis of the brachiocephalic or subclavian arteries. CAROTID ARTERIES: The common carotid arteries are patent. In the midportion of the right internal carotid artery cervical segment along the medial wall is new thrombus which slightly projects into the lumen and causes 50% luminal stenosis. Given that this is completely new since September and associated with an intraluminal projection, this is at high risk for distal embolization. There is also a focal small flap-like filling defect in the lateral aspect of the cavernous segment which is new. The left internal carotid artery is patent. VERTEBRAL ARTERIES: No dissection, arterial injury, or significant stenosis. SOFT TISSUES: Extensive pulmonary ground-glass opacities could represent pneumonia. No neck mass. BONES: No acute osseous abnormality. CTA HEAD: ANTERIOR CIRCULATION: No stenosis or occlusion of the anterior or middle cerebral arteries. POSTERIOR CIRCULATION: No significant stenosis of the vertebral, basilar, or posterior cerebral arteries. No aneurysm. OTHER: No dural venous sinus thrombosis on this non-dedicated study. BRAIN: No mass effect or midline shift.  No extra-axial fluid collection. The gray-white differentiation is maintained. Right internal carotid artery acute intraluminal thrombus and acute dissection without complete vessel occlusion. No intracranial large vessel occlusion. Pulmonary ground-glass opacities could represent pneumonia or pulmonary edema. Critical results were called by Dr. Antelmo Santizo MD to [employer] on 10/24/2020 at 21:54.        LAB RESULTS  --------------------    Recent Results (from the past 24 hour(s))   Protime-INR    Collection Time: 10/29/20  4:31 AM   Result Value Ref Range    Protime 14.4 11.7 - 14.5 SECONDS    INR 1.19 INDEX   CBC    Collection Time: 10/29/20  4:31 AM   Result Value Ref Range    WBC 7.2 4.0 - 10.5 K/CU MM    RBC 4.16 (L) 4.2 - 5.4 M/CU MM    Hemoglobin 12.9 12.5 - 16.0 GM/DL    Hematocrit 36.9 (L) 37 - 47 %    MCV 88.7 78 - 100 FL    MCH 31.0 27 - 31 PG    MCHC 35.0 32.0 - 36.0 %    RDW 13.1 11.7 - 14.9 %    Platelets 323 948 - 032 K/CU MM    MPV 10.6 7.5 - 11.1 FL   SPECIMEN REJECTION    Collection Time: 10/29/20  4:31 AM   Result Value Ref Range    Rejected Test PTT2     Reason for Rejection UNABLE TO PERFORM TESTING:    APTT    Collection Time: 10/29/20 10:30 AM   Result Value Ref Range    aPTT 46.1 (H) 25.1 - 37.1 SECONDS   APTT    Collection Time: 10/29/20  4:53 PM   Result Value Ref Range    aPTT 84.2 (H) 25.1 - 37.1 SECONDS         Medical problems    Patient Active Problem List:     Headache     Acute CVA (cerebrovascular accident) ([facility] [address]     Acute cerebrovascular accident ([facility] [address]      ASSESSMENT:  ---------------------    Left occipital subacute infarct with petechial hemorrhage-DC heparin due to petechial hemorrhage seen on CT brain    Left Parieto-Occipital infarct-acute with petechial hemorrhage on CT brain and Mri brain     Subacute bilateral cerebellar-left occipital-right frontal infarcts     Right carotid cervical dissection-thrombus     A fib     PLAN:     Mri brain Mra neck as above     CT brain CTA head neck as above     Continue asa    DC heparin due to petechial hemorrhage seen on CT brain    Continue coumadin    Start plavix and continue till INR is therapeutic    Electronically signed by Marium Yang MD on 10/29/2020 at 11:48 PM

## 2020-10-30 NOTE — PROGRESS NOTES
Hospitalist Progress Note      Name:  Mikal Manrique /Age/Sex: 1928  (80 y.o. female)   MRN & CSN:  [id number] & [id number] Admission Date/Time: 10/24/2020  7:40 PM   Location:  [facility]3112 PCP: Reece Pond MD         Hospital Day: 7  DISPOSITION: Likely home with home health care  [facility] 17: Awaiting INR to be therapeutic      ASSESSMENT/PLAN:  Mikal Manrique is a 80 y.o.  female who presented to the hospital with left facial droop and slurred speech. CTA head and neck showed right internal carotid artery with intraluminal thrombus and acute dissection without complete vessel occlusion. CT head showed findings most consistent with acute infarction of the left periatrial-occipital region. Slurred speech has improved but continues to have a slurred speech. She was not a TPA candidate. Recommendation for transfer to [address] [place] [address] declined by family members. I talked to [place] on the morning of 10/27 and discussed transfer again. Family members are not interested in transfer at this point. Acute ischemic stroke----found to have right internal carotid artery dissection with intraluminal thrombus. CT head showed findings consistent with infarction of the left parieto-occipital region. She also had a stroke in 2020. MRI of the brain on 2020 showed scattered small acute to subacute infarctions most pronounced at in the right occipital lobe, minimally in the left frontal lobe and bilateral cerebellar hemisphere and could be embolic. Repeat MRI done on 10/20 showed showed acute small to moderate volume ischemic infarct in the medial left occipital lobe with associated petechial hemorrhage. MRI also showed scattered additional punctate acute ischemic infarct within the bilateral frontal and parietal lobes. Neurology aware of these findings. Neurology indicated that benefits of anticoagulation outweighs the risk of bleeding at this point.   Upon reviewing cardiology notes, there is no plans for [facility] at this point. Heparin drip discontinued on 10/29 by neurology.    -Continue Plavix until INR is therapeutic  -Warfarin, pharmacy to dose  -Daily INR, target INR 2-3  -Lipitor 80 mg daily  -Aspirin    Paroxysmal A. fib---currently in sinus rhythm with rates controlled. -Continue warfarin  -Continue metoprolol 25 twice daily    Hypertension---BP controlled  -Lopressor 25 twice daily    Hypothyroidism---on levothyroxine    Breast cancer---s/p bilateral mastectomy in the past    MEDICAL DECISION MAKING:  -Labs reviewed  -Imaging reviewed  -Level of risk moderate  Diet DIET CARDIAC; No Added Salt (3-4 GM)  Dietary Nutrition Supplements: Low Calorie High Protein Supplement   DVT Prophylaxis [] Lovenox, [x] warfarin, [] SCDs, [] Ambulation   GI Prophylaxis [] PPI,  [] H2 Blocker,  [] Carafate,  [] Diet/Tube Feeds   Code Status Full Code   Disposition  home   MDM [] Low, [x] Moderate,[]  High     Chief complaint/Interval History/ROS     Chief Complaint: Slurred speech, facial droop    INTERVAL HISTORY: Clinically stable. Awaiting INR to be therapeutic       ROS:  No chest pain. No abdominal pain. No nausea. Objective:     No intake or output data in the 24 hours ending 10/30/20 1212   Vitals:   Vitals:    10/30/20 1100   BP: 119/74   Pulse: 81   Resp: 16   Temp: 95.5 °F (35.3 °C)   SpO2: 96%     Physical Exam:   GEN: Awake female, present. Answers questions appropriate. EYES: No eye discharge. Ocular muscles intact. HENT: Membranes moist.  No nasal discharge. NECK: Supple, no neck mass. RESP: Clear to auscultation bilaterally. CV: Irregularly irregular rhythm. No pitting lower extremity edema. GI: Abdomen soft. Nontender. Nondistended. : No Martin in place. MSK: No bone fractures. No gross deformities. SKIN: warm, dry, no rashes  NEURO: Cranial nerves appear grossly intact, speech is slurred. No motor deficit.   PSYCH: Awake, alert, oriented    Medications: Medications:    warfarin  5 mg Oral Daily    clopidogrel  75 mg Oral Daily    enoxaparin  40 mg Subcutaneous Daily    lactulose  20 g Oral TID    metoprolol tartrate  25 mg Oral BID    docusate sodium  100 mg Oral BID    sodium chloride flush  10 mL Intravenous 2 times per day    atorvastatin  80 mg Oral Nightly    levothyroxine  112 mcg Oral Daily    aspirin  81 mg Oral Daily      Infusions:     PRN Meds: labetalol, 10 mg, Q10 Min PRN  metoprolol, 5 mg, Q6H PRN  sodium chloride flush, 10 mL, PRN  polyethylene glycol, 17 g, Daily PRN  promethazine, 12.5 mg, Q6H PRN    Or  ondansetron, 4 mg, Q6H PRN          Electronically signed by Aure Ac MD on 10/30/2020 at 12:12 PM

## 2020-10-30 NOTE — DISCHARGE SUMMARY
Discharge Summary    Name:  Joana Goetz /Age/Sex: 1928  (80 y.o. female)   MRN & CSN:  [id number] & [id number] Admission Date/Time: 10/24/2020  7:40 PM   Attending:  Chas Evans MD Discharging Physician: Chas Evans MD     Hospital Course:   Joana Goetz is a 80 y.o.  female who presented to the hospital with left facial droop and slurred speech. CTA head and neck showed right internal carotid artery with intraluminal thrombus and acute dissection without complete vessel occlusion. CT head showed findings most consistent with acute infarction of the left periatrial-occipital region. Slurred speech has improved but continues to have a slurred speech. She was not a TPA candidate. Recommendation for transfer to [facility] declined by family members. I talked to [place] on the morning of 10/27 and discussed transfer again. Family members are not interested in transfer at this point.     Acute ischemic stroke----found to have right internal carotid artery dissection with intraluminal thrombus. CT head showed findings consistent with infarction of the left parieto-occipital region. She also had a stroke in 2020. MRI of the brain on 2020 showed scattered small acute to subacute infarctions most pronounced at in the right occipital lobe, minimally in the left frontal lobe and bilateral cerebellar hemisphere and could be embolic. Repeat MRI done on 10/20 showed showed acute small to moderate volume ischemic infarct in the medial left occipital lobe with associated petechial hemorrhage. MRI also showed scattered additional punctate acute ischemic infarct within the bilateral frontal and parietal lobes. Heparin drip was discontinued by neurology on 10/29. She was a started on Plavix in addition to the aspirin. Patient to continue Plavix until INR is therapeutic. She will continue aspirin on discharge.   I discussed this with her granddaughter who is a nurse on the day of discharge. I instructed her that she has INR done on November 2, 2020. Home health care was ordered for INR to be drawn. When the INR is therapeutic between 2 and 3, the patient will need to discontinue Plavix and she can continue taking aspirin. I discussed this with her granddaughter Jeet Clark.      Paroxysmal A. fib---currently in sinus rhythm with rates controlled. Discharged home with warfarin and Lopressor 25 twice daily.     Hypertension---BP controlled. On Lopressor 25 twice daily.     Hypothyroidism---on levothyroxine     Breast cancer---s/p bilateral mastectomy in the past    Constipation----discharge treated with lactulose 10 BID PRN. The patient expressed appropriate understanding of and agreement with the discharge recommendations, medications, and plan. Consults this admission:  IP CONSULT TO PHARMACY  PHARMACY TO CHANGE BASE FLUIDS  IP CONSULT TO PHARMACY  PHARMACY TO CHANGE BASE FLUIDS  IP CONSULT TO HOSPITALIST  IP CONSULT TO NEUROLOGY  IP CONSULT TO CARDIOLOGY  IP CONSULT TO PHARMACY  IP CONSULT TO PHARMACY  IP CONSULT TO [address] [place] [address] NEEDS    Discharge Instruction:   Follow with PCP and cardiology    Diet:  cardiac diet   Activity: activity as tolerated  Disposition: Discharged to:   [x]Home, [x][facility], []SNF, []Acute Rehab, []Hospice  Condition on discharge: Stable    Discharge Medications:      Saroj Chamberlain Doctor Valdo [facility] Medication Instructions AUN:[id number]    Printed on:10/30/20 3115   Medication Information                      aspirin 81 MG EC tablet  Take 1 tablet by mouth daily             atorvastatin (LIPITOR) 80 MG tablet  Take 1 tablet by mouth nightly             clopidogrel (PLAVIX) 75 MG tablet  Take 1 tablet by mouth daily             lactulose (CHRONULAC) 10 GM/15ML solution  Take 15 mLs by mouth 2 times daily as needed (constipation.)             levothyroxine (SYNTHROID) 112 MCG tablet  Take 112 mcg by mouth Daily.              metoprolol tartrate (LOPRESSOR) 25 MG tablet  Take 1 tablet by mouth 2 times daily             warfarin (COUMADIN) 4 MG tablet  Take 1 tablet by mouth every evening                 Objective Findings at Discharge:   BP (!) 118/97   Pulse 93   Temp 95.2 °F (35.1 °C) (Axillary)   Resp 19   Ht 5' (1.524 m)   Wt 149 lb 8 oz (67.8 kg)   SpO2 97%   BMI 29.20 kg/m²            PHYSICAL EXAM   GEN: Awake female, present. Answers questions appropriate. EYES: No eye discharge. Ocular muscles intact. HENT: Membranes moist.  No nasal discharge. NECK: Supple, no neck mass. RESP: Clear to auscultation bilaterally. CV: Irregularly irregular rhythm. No pitting lower extremity edema. GI: Abdomen soft. Nontender. Nondistended. : No Martin in place. MSK: No bone fractures. No gross deformities. SKIN: warm, dry, no rashes  NEURO: Cranial nerves appear grossly intact, speech is slurred. No motor deficit.   PSYCH: Awake, alert, oriented    BMP/CBC  Recent Labs     10/29/20  0431 10/30/20  0438   WBC 7.2 6.5   HCT 36.9* 36.7*    287       IMAGING:  All imaging reviewed before discharge    Discharge Time of 34  minutes    Electronically signed by Faiza Sheppard MD on 10/30/2020 at 1:45 PM

## 2020-10-30 NOTE — CARE COORDINATION
Notified [facility] HC liaison/Amanda of d/c. She will notify office.   TE

## 2020-10-30 NOTE — PROGRESS NOTES
PHARMACY ANTICOAGULATION MONITORING SERVICE    Joana Goetz is a 80 y.o. female on warfarin therapy for R carotid cervical dissection with thrombus; A-fib. Pharmacy consulted by Dr. Ivon Payan for monitoring and adjustment of treatment. Indication for anticoagulation: R carotid cervical dissection with thrombus; A-fib  INR goal: 2-3  Warfarin dose prior to admission: none    Pertinent Laboratory Values   Recent Labs     10/29/20  0431 10/30/20  0438   INR 1.19 1.29   HGB 12.9 12.2*   HCT 36.9* 36.7*    287     INR MONITORING  Recent Labs     10/27/20  1943 10/28/20  0216 10/29/20  0431 10/30/20  0438   INR 1.18 1.25 1.19 1.29     Assessment/Plan:   Possible DDI's:  o Aspirin 81 mg daily ordered (lower dose than home)  o Atorvastatin (home med) can increase the effects of warfarin  o Enoxaparin 40mg daily until INR is therapeutic  o Levothyroxine (home medication) can increase effects of warfarin  o Prior to inpatient admission, receiving clopidogrel which has now been discontinued (last dose 10/27)   Bleeding Risks:  o Age > 72  o Clinically appropriate dual therapy with other anticoagulants/antiplatelets   INR with very slight upward trend after dose increase from 2.5mg to 5mg. The full effects of the dose increase will likely not be seen until tomorrow. Will proceed slowly due to advanced age of 80   Will continue with warfarin 5mg daily. If INR shows little movement tomorrow, will look to increase warfarin dose again. [address] [place] [address] will continue to monitor and adjust warfarin therapy as indicated    Thank you for the consult. Anastasia Elizalde  10:20 AM  10/30/20

## 2020-10-30 NOTE — PROGRESS NOTES
Physical Therapy    Physical Therapy Treatment Note  Name: Carlos Kaplan MRN: [id number] :   1928   Date:  10/30/2020   Admission Date: 10/24/2020 Room:  [address] [place], [place] [address]A   Restrictions/Precautions:          Communication with other providers:  Per nurse ok to tx  Subjective:  Patient states: Motivated and agreeable to tx. Pt always likes to tell stories about places she has been during tx. Pain:   Location, Type, Intensity (0/10 to 10/10):  No c/o pain during tx  Objective:    Observation:  Alert and oriented  Treatment, including education/measures:  Sup to sit sba  Sit<=>stand sba  Pt amb short distance 5'-6' x2 without assistive device sba  amb with st. cane 120' sba no LOB with 2 standing rest. Pt is very talkative and needs cues for purse lip breathing and cues to take breaks from talking while amb. Ex sitting EOB:  Trunk stretches with shoulder flex and cues for deep breathing  10 reps laqs  10 reps aps and circles. Assessment / Impression:       Patient's tolerance of treatment: good  Adverse Reaction: na  Significant change in status and impact:  na  Barriers to improvement:  Strength and safety  Plan for Next Session:    Cont.  POC  Time in:  1110  Time out:  1150  Timed treatment minutes:  40  Total treatment time:  36    Previously filed items:  Social/Functional History  Lives With: Alone  Type of Home: House  Home Layout: One level, Laundry in basement  Home Access: Stairs to enter with rails  Entrance Stairs - Number of Steps: 5 steps  Bathroom Shower/Tub: Tub/Shower unit  Bathroom Toilet: Handicap height  Bathroom Equipment: Grab bars in shower, Grab bars around toilet  Bathroom Accessibility: Accessible  Home Equipment: [employer] Help From: Family, Friend(s)  ADL Assistance: Independent  Homemaking Assistance: Needs assistance( comes 3 days/week)  Homemaking Responsibilities: No  Ambulation Assistance: Independent  Transfer Assistance: Independent  Active : No  Patient's  Info:  Active  until last week 10/2020  Short term goals  Time Frame for Short term goals: 1 week  Short term goal 1: Pt to complete all bed mobility mod I  Short term goal 2: Pt to complete all STS transfers to/from bed, commode, and chair mod I  Short term goal 3: Pt to ambulate 48' LRAD mod I  Short term goal 4: Pt to ambulate 150' with LRAD and supervision  Short term goal 5: Pt to ascend/descecnd 5 steps with supervision to simulate home set up       Electronically signed by:    Gail Aguilar PTA  10/30/2020, 8:40 AM

## 2020-10-30 NOTE — PROGRESS NOTES
CLINICAL PHARMACY NOTE: MEDS TO [address] [place] [address] Drive Select Patient?: Yes  Total # of Prescriptions Filled: 3   The following medications were delivered to the patient:  · Warfararin 4  · Lactulose 473 ml  · Metoprolol 25 mg 60  Total # of Interventions mg  Completed: 3  Time Spent (min): 45    Additional Documentation:

## 2020-10-30 NOTE — PROGRESS NOTES
Comprehensive Nutrition Assessment    Type and Reason for Visit:  Initial(los 5)    Nutrition Recommendations/Plan:   · Continue current diet, liberalize if intake is less than 50%  · Begin low roberth high protein oral nutrition supplement daily, between meals    Nutrition Assessment:  Admitted with acute CVA. Swallow WFL per SLP. Feeding self on Cardiac Diet, but consuming less than half of meals. Will order oral supplement to optimize intake with advanced age. Malnutrition Assessment:  Malnutrition Status:  No malnutrition    Context:  Acute Illness       Estimated Daily Nutrient Needs:  Energy (kcal):  7236-8910 (MSJ); Weight Used for Energy Requirements:  Current     Protein (g):  45-54 (1-1.2 g/kg IBW); Weight Used for Protein Requirements:  Ideal        Fluid (ml/day):  9798-3405 (1 ml/kcal); Method Used for Fluid Requirements:  1 ml/kcal      Nutrition Related Findings:  A1C 6.1      Wounds:  None       Current Nutrition Therapies:    DIET CARDIAC; No Added Salt (3-4 GM)    Anthropometric Measures:  · Height: 5' (152.4 cm)  · Current Body Weight: 149 lb 8 oz (67.8 kg)   · Admission Body Weight: 146 lb 2.6 oz (66.3 kg)    · Usual Body Weight: 154 lb 2 oz (69.9 kg)(9/20/20)     · Ideal Body Weight: 100 lbs; % Ideal Body Weight 149.5 %   · BMI: 29.2  · BMI Categories: Overweight (BMI 25.0-29. 9)       Nutrition Diagnosis:   · Predicted inadequate energy intake related to cognitive or neurological impairment as evidenced by intake 0-25%    Nutrition Interventions:   Food and/or Nutrient Delivery:  Continue Current Diet, Start Oral Nutrition Supplement  Nutrition Education/Counseling:  No recommendation at this time   Coordination of Nutrition Care:  Continue to monitor while inpatient    Goals:  Pt will consume greater than 50% of her meals and supplements       Nutrition Monitoring and Evaluation:   Food/Nutrient Intake Outcomes:  Food and Nutrient Intake, Supplement Intake  Physical Signs/Symptoms Outcomes: Biochemical Data, Chewing or Swallowing, Meal Time Behavior, Weight     Discharge Planning:    Continue Oral Nutrition Supplement     Electronically signed by Afua Oliveros, BROOKE, LD on 10/30/20 at 11:52 AM EDT    Contact: [phone]

## 2020-12-23 NOTE — TELEPHONE ENCOUNTER
Spoke with [facility]. Pharmacy to receive price of Eliquis and Xarelto. Brett Le would like to know the price of the medication as the patient is currently taking Coumadin. Brett Le may have to switch patient off Coumadin if she starts on amiodarone. For 30 day supply Eliquis and Xarelto would cost patient roughly (with medicare coverage) $125. Will advise Brett Le of medication cost. Patient is scheduled for YOSELYN/CV on 1/18/21.

## 2020-12-23 NOTE — PATIENT INSTRUCTIONS
Have COVID screening and sign procedure paperwork on 1/14 at 2pm. YOSELYN/Cardioversion scheduled for 1/18/21 at 11:30. Arrive at the hospital at 0930.

## 2020-12-23 NOTE — PROGRESS NOTES
Electrophysiology Consult Note      Reason for consultation:  Atrial fibrillation    Chief complaint :  Shortness of breath with exertion    Referring physician: Dr. Judy Jones      Primary care physician: Jessica Duarte MD      History of Present Illness:     Patient is a 66-year-old female with history of recent CVA referred by Dr. Judy Jones for atrial fibrillation. Patient reports she is very independent. In September she had strokelike symptoms and then came to the hospital patient was placed on Coumadin since then patient is having intermittent palpitations and shortness of breath. Patient feels that she has gone downhill since then. Patient reports she lives alone and has no family around. Patient came with a friend today. 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 She was a  previously. Patient feels tired and weak has no energy. She has shortness of breath with minimal exertion. She denies any chest pain, dizziness or syncope      Pastmedical history:   Past Medical History:   Diagnosis Date    Cancer ([facility] [address]     Hypertension     Osteoporosis     TIA (transient ischemic attack)        Surgical history :   Past Surgical History:   Procedure Laterality Date    BREAST SURGERY         Family history: No sudden cardiac death    Social history :  reports that she has never smoked. She has never used smokeless tobacco. She reports that she does not drink alcohol or use drugs.     Allergies   Allergen Reactions    Statins      Muscle pain       Current Outpatient Medications on File Prior to Visit   Medication Sig Dispense Refill    digoxin (LANOXIN) 125 MCG tablet Take 125 mcg by mouth daily       metoprolol tartrate (LOPRESSOR) 25 MG tablet Take 1 tablet by mouth 2 times daily 60 tablet 1    warfarin (COUMADIN) 4 MG tablet Take 1 tablet by mouth every evening 30 tablet 0  clopidogrel (PLAVIX) 75 MG tablet Take 1 tablet by mouth daily 30 tablet 3    levothyroxine (SYNTHROID) 112 MCG tablet Take 112 mcg by mouth Daily. No current facility-administered medications on file prior to visit. Review of Systems:   Review of Systems   Constitutional: Positive for fatigue. Negative for activity change, chills and fever. HENT: Negative for congestion, ear pain and tinnitus. Eyes: Negative for photophobia, pain and visual disturbance. Respiratory: Positive for shortness of breath. Negative for cough, chest tightness and wheezing. Cardiovascular: Positive for palpitations. Negative for chest pain and leg swelling. Gastrointestinal: Negative for abdominal pain, blood in stool, constipation, diarrhea, nausea and vomiting. Endocrine: Negative for cold intolerance and heat intolerance. Genitourinary: Negative for dysuria, flank pain and hematuria. Musculoskeletal: Positive for arthralgias. Negative for back pain, myalgias and neck stiffness. Skin: Negative for color change and rash. Allergic/Immunologic: Negative for food allergies. Neurological: Negative for dizziness, light-headedness, numbness and headaches. Hematological: Does not bruise/bleed easily. Psychiatric/Behavioral: Negative for agitation, behavioral problems and confusion. Examination:      Vitals:    12/23/20 1108   BP: 120/80   Pulse: 81   Resp: 14   Temp: 97.1 °F (36.2 °C)   SpO2: 98%   Weight: 139 lb (63 kg)   Height: 5' (1.524 m)        Body mass index is 27.15 kg/m². Physical Exam  Constitutional:       Appearance: She is well-developed. HENT:      Head: Normocephalic and atraumatic. Nose: No congestion. Mouth/Throat:      Mouth: Mucous membranes are moist.   Eyes:      Conjunctiva/sclera: Conjunctivae normal.      Pupils: Pupils are equal, round, and reactive to light. Neck:      Musculoskeletal: Normal range of motion. Thyroid: No thyromegaly. Patient is a 78-year-old female who is very independent up until recently used to drive a sports car and she was a  reportedly in September had a strokelike symptoms and then came to the hospital patient has been on Coumadin since then patient is having intermittent palpitations and shortness of breath and patient is feeling going downhill since then. Patient has PAF episodes reported patient presently presented in atrial fibrillation with normal ventricular rate  Patient is on metoprolol 25 mg twice daily and digoxin 125 mg daily at this point    Discussed with the patient about options of cardioversion and antiarrhythmic therapy.      I recommended either sotalol or Tikosyn and 72-hour admission but patient is not sure if she wants to do that as she has cats at home and she is alone and has no family she came with a friend today    I did discuss with her that she is on Coumadin and I am concerned about amiodarone interaction with Coumadin and I would not like to have those combination especially in elderly women as they could be high risk of bleeding with supratherapeutic INR if she wants to be on amiodarone then the best thing to do is changed Coumadin to either Eliquis or Xarelto which are not under formulary at this time and would like to check how much it would cost and if it could be afforded by her    Also with amiodarone there is a risk of hypothyroidism and patient also that he has a history of hypothyroidism and is on levothyroxine    Patient unfortunately is symptomatic and until recently she was very well to do and she really wants something done to help her get back to her routine again    I think it is reasonable to try YOSELYN cardioversion with antiarrhythmic therapy and if it does not work then we could consider AV node ablation with a pacemaker but given her age of 80 years A. fib ablation may be little higher risk for her Gave her the earliest appointment for YOSELYN cardioversion but patient wants to wait till middle of January because she has to get arrangements for her cats    Thanks again for allowing me to participate in care of this patient. Please call me if you have any questions. With best regards. Raquel Oakes MD, 12/23/2020 11:30 AM     Please note this report has been partially produced using speech recognition software and may contain errors related to that system including errors in grammar, punctuation, and spelling, as well as words and phrases that may be inappropriate. If there are any questions or concerns please feel free to contact the dictating provider for clarification.

## 2020-12-23 NOTE — LETTER
Electrical cardioversion: The electrical procedure is done in a hospital. You will get medicine to help you relax and control the pain. Your doctor will put paddles or patches on your chest and back. These send an electric current to your heart. This resets your heart rhythm. The electrical part takes about 5 minutes. But you will probably be in the hospital for 1 to 2 hours. You will need to recover from the effects of the pain medicine. Chemical cardioversion: The chemical procedure is most often done in a hospital. In most cases, the medicine is put into your arm through a tube called an IV. But you may get medicines to take by mouth. You may feel a quick sting or pinch when the IV starts. The procedure usually takes about 30 to 60 minutes for procedure. Transesophageal Echocardiogram  What is a transesophageal echocardiogram?  A transesophageal echocardiogram is a test to help your doctor look at the inside of your heart. A small device called a transducer directs sound waves toward your heart. The sound waves make a picture of the heart's valves and chambers. Your doctor may do this test to look for certain types of heart disease. Or it may be done to see how disease is affecting your heart. You will be given medicine to make you sleepy and comfortable during the test. The doctor puts a small, flexible tube into your throat and guides it to the esophagus. This is the tube that connects your mouth to your stomach. The doctor will ask you to swallow as the tube goes down. The transducer is at the tip of the tube. It gets close to your heart to make clear pictures. The doctor will look at the ultrasound pictures on a screen. You will not be able to eat or drink until the numbness from the throat spray wears off.  Your throat may be sore for a few days after the test. Follow-up care is a key part of your treatment and safety. Be sure to make and go to all appointments, and call your doctor if you are having problems. It's also a good idea to know your test results and keep a list of the medicines you take. IMPORTANT NOTICE TO PATIENTS: Prior Authorization  We will contact your insurance for prior authorization for the CPT code related to the procedure it is the patient's responsibility to contact their insurance to ensure they are following their medical plans provisions or requirements! [address]/CARDIOLOGY        Patient Name: Jocelyne Woodward   : 1928   MRN# [id number]    Transesophageal Echocardiogram with Cardioversion    Day of Procedure Cath Lab Holding Area Preop Orders:    ? YOU HAVE MY PERMISSION TO TALK TO THE PATIENT-PLEASE    ? Anesthesia    ? YOSELYN with Anesthesia    ? IV peripheral saline lock  (preferred left arm). ? STAT LABS ON ARRIVAL: BMP, MAG, PHOS, CBC, PT INR, PTT    ? If taking Coumadin, PT INR  STAT on arrival day of procedure. ? 12 lead EKG STAT on arrival day of procedure. ? Diabetic patients >> Accu check Blood sugar check. ? Document home medications in EPIC and include date and time of last dose.    ? NPO.    ? Notify Dr. Monique Ochoa of abnormal lab results. ? Chest Prep> Clip hair anterior chest and posterior back. ?   ?  If Tikosyn or Sotalol loading  Planned >>3 Varun bed            Physician Signature:_______________________Date:___________Time:____________                            [facility]) Informed Consent for Anesthesia/Sedation, Surgery, Invasive Procedures, and other High-risk Interventions and Medication use     *This consent is applicable for 30 days following patient signature*    Procedure(s)   Jocelyne RIVERA authorize, Dr. Rosalba Mason ? I consent to release of my social security number and other identifying information to [address] (FDA), and the supplier/, if I receive tissue, a device, or implant. This is to track the tissue, device, or implant for defect, recall, infection, etc.     ? Use of blood and/or blood products, if needed, through my hospital stay. My practitioner has advised me of the risks of using, risks of not using, benefits, side effects, and alternatives. ___ I do NOT want Blood or Blood products given. (Complete separate  refusal form)    Code Status (vida one):  ___ I do NOT HAVE a DNR order. I am a Full-code.   I will receive CPR, intubation,  chest compressions, medications, and/or other life saving measures if I have a  cardiac or respiratory arrest.    ___ I have a Do Not Resuscitate (DNR)order.   (vida one below)  ___  I rescind my DNR for surgery and immediate post-operative period through Phase 2 recovery. This means, for that time period, I will be a Full-code and receive CPR, intubation, chest compressions, medications, and/or other life saving measures, if I have a cardiac or respiratory arrest.    ___ I WANT to keep my DNR in effect during my procedure(s) and immediate post-operative recovery period through Phase 2 recovery. (Complete separate refusal form)     This form has been fully explained to me. I understand its contents.       Patients Signature: ___________________________Date: ________  Time: ________    If patient unable to sign, has engaged the [address] [place] [address], is a minor, or has a court-appointed Guardian:  [address] [place] [address] Representative Name (Print):  ____________________________________      Relationship ([place] one):    Guardian   Parent    Spouse    HCPOA   Child   Sibling  Next-of-Kin Friend    Patients Representative Signature: _______________________________________              Date: ______________  Time: __________ An  was used.  name/ID: _________________________________      [facility] Witness________________________  Date: ________   Time: _________    Physician/Practitioner _______________________  Date: ________   Time: _________           Revision 2017    Rajan Santacruz     PROCEDURE TO SCHEDULE:    Transesophageal Echocardiogram with Cardioversion    Patient Name: Skinny Khan   : 1928   MRN# [id number]    Home Phone Number: [phone]   Weight:    Wt Readings from Last 3 Encounters:   20 139 lb (63 kg)   10/30/20 149 lb 8 oz (67.8 kg)   20 154 lb 2 oz (69.9 kg)        Insurance: Payor: Felipe Zimmerman / Plan: MEDICARE PART A AND B / Product Type: *No Product type* /     Date of Procedure: 2021 Time: 1130 Arrival Time: 930    Diagnosis:  A-Fib I48.11  Allergies:    Allergies   Allergen Reactions    Statins      Muscle pain          1) Call [facility] scheduling ([phone]) or Instant Message  CONFIRMED WITH     PHONE OR   INSTANT MESSAGE  2) PREAUTHORIZATION NUMBER:    Spoke to:      From date:     expiration date:          Donna Gallegos

## 2021-01-01 ENCOUNTER — APPOINTMENT (OUTPATIENT)
Dept: CT IMAGING | Age: 86
DRG: 065 | End: 2021-01-01
Payer: MEDICARE

## 2021-01-01 ENCOUNTER — HOSPITAL ENCOUNTER (INPATIENT)
Age: 86
LOS: 2 days | DRG: 951 | End: 2021-03-19
Attending: FAMILY MEDICINE | Admitting: FAMILY MEDICINE
Payer: COMMERCIAL

## 2021-01-01 ENCOUNTER — OFFICE VISIT (OUTPATIENT)
Dept: CARDIOLOGY CLINIC | Age: 86
End: 2021-01-01
Payer: MEDICARE

## 2021-01-01 ENCOUNTER — APPOINTMENT (OUTPATIENT)
Dept: GENERAL RADIOLOGY | Age: 86
DRG: 065 | End: 2021-01-01
Payer: MEDICARE

## 2021-01-01 ENCOUNTER — TELEPHONE (OUTPATIENT)
Dept: CARDIOLOGY CLINIC | Age: 86
End: 2021-01-01

## 2021-01-01 ENCOUNTER — HOSPITAL ENCOUNTER (INPATIENT)
Age: 86
LOS: 2 days | Discharge: HOSPICE | DRG: 065 | End: 2021-03-17
Attending: EMERGENCY MEDICINE | Admitting: INTERNAL MEDICINE
Payer: MEDICARE

## 2021-01-01 VITALS
OXYGEN SATURATION: 95 % | BODY MASS INDEX: 26.7 KG/M2 | HEART RATE: 75 BPM | TEMPERATURE: 96.8 F | DIASTOLIC BLOOD PRESSURE: 80 MMHG | HEIGHT: 60 IN | WEIGHT: 136 LBS | SYSTOLIC BLOOD PRESSURE: 138 MMHG

## 2021-01-01 VITALS
RESPIRATION RATE: 17 BRPM | BODY MASS INDEX: 26.71 KG/M2 | WEIGHT: 136.02 LBS | HEART RATE: 136 BPM | OXYGEN SATURATION: 97 % | TEMPERATURE: 97.3 F | DIASTOLIC BLOOD PRESSURE: 87 MMHG | HEIGHT: 60 IN | SYSTOLIC BLOOD PRESSURE: 104 MMHG

## 2021-01-01 VITALS
DIASTOLIC BLOOD PRESSURE: 67 MMHG | HEIGHT: 60 IN | BODY MASS INDEX: 26.71 KG/M2 | HEART RATE: 136 BPM | TEMPERATURE: 101.9 F | RESPIRATION RATE: 24 BRPM | OXYGEN SATURATION: 74 % | WEIGHT: 136.02 LBS | SYSTOLIC BLOOD PRESSURE: 121 MMHG

## 2021-01-01 DIAGNOSIS — I48.19 PERSISTENT ATRIAL FIBRILLATION (HCC): Primary | ICD-10-CM

## 2021-01-01 DIAGNOSIS — I63.511 STROKE DUE TO OCCLUSION OF RIGHT MIDDLE CEREBRAL ARTERY (HCC): ICD-10-CM

## 2021-01-01 DIAGNOSIS — R91.8 GROUND GLASS OPACITY PRESENT ON IMAGING OF LUNG: ICD-10-CM

## 2021-01-01 DIAGNOSIS — I63.9 ACUTE CVA (CEREBROVASCULAR ACCIDENT) (HCC): Primary | ICD-10-CM

## 2021-01-01 DIAGNOSIS — T45.515A WARFARIN-INDUCED COAGULOPATHY (HCC): ICD-10-CM

## 2021-01-01 DIAGNOSIS — I48.91 ATRIAL FIBRILLATION WITH TACHYCARDIC VENTRICULAR RATE (HCC): ICD-10-CM

## 2021-01-01 DIAGNOSIS — D68.32 WARFARIN-INDUCED COAGULOPATHY (HCC): ICD-10-CM

## 2021-01-01 DIAGNOSIS — I65.21 ICAO (INTERNAL CAROTID ARTERY OCCLUSION), RIGHT: ICD-10-CM

## 2021-01-01 LAB
ALBUMIN SERPL-MCNC: 3.4 GM/DL (ref 3.4–5)
ALP BLD-CCNC: 75 IU/L (ref 40–129)
ALT SERPL-CCNC: 40 U/L (ref 10–40)
ANION GAP SERPL CALCULATED.3IONS-SCNC: 17 MMOL/L (ref 4–16)
AST SERPL-CCNC: 68 IU/L (ref 15–37)
BASOPHILS ABSOLUTE: 0 K/CU MM
BASOPHILS RELATIVE PERCENT: 0.1 % (ref 0–1)
BILIRUB SERPL-MCNC: 1.8 MG/DL (ref 0–1)
BUN BLDV-MCNC: 20 MG/DL (ref 6–23)
CALCIUM SERPL-MCNC: 9.4 MG/DL (ref 8.3–10.6)
CHLORIDE BLD-SCNC: 95 MMOL/L (ref 99–110)
CO2: 23 MMOL/L (ref 21–32)
CREAT SERPL-MCNC: 0.5 MG/DL (ref 0.6–1.1)
DIFFERENTIAL TYPE: ABNORMAL
DIGOXIN LEVEL: 0.5 NG/ML (ref 0.8–2)
DOSE AMOUNT: ABNORMAL
DOSE TIME: ABNORMAL
EKG DIAGNOSIS: NORMAL
EKG Q-T INTERVAL: 308 MS
EKG QRS DURATION: 70 MS
EKG QTC CALCULATION (BAZETT): 454 MS
EKG R AXIS: -50 DEGREES
EKG T AXIS: -12 DEGREES
EKG VENTRICULAR RATE: 131 BPM
EOSINOPHILS ABSOLUTE: 0 K/CU MM
EOSINOPHILS RELATIVE PERCENT: 0.1 % (ref 0–3)
GFR AFRICAN AMERICAN: >60 ML/MIN/1.73M2
GFR NON-AFRICAN AMERICAN: >60 ML/MIN/1.73M2
GLUCOSE BLD-MCNC: 128 MG/DL (ref 70–99)
HCT VFR BLD CALC: 47 % (ref 37–47)
HEMOGLOBIN: 14.9 GM/DL (ref 12.5–16)
IMMATURE NEUTROPHIL %: 0.4 % (ref 0–0.43)
LYMPHOCYTES ABSOLUTE: 0.4 K/CU MM
LYMPHOCYTES RELATIVE PERCENT: 2.6 % (ref 24–44)
MCH RBC QN AUTO: 26.9 PG (ref 27–31)
MCHC RBC AUTO-ENTMCNC: 31.7 % (ref 32–36)
MCV RBC AUTO: 85 FL (ref 78–100)
MONOCYTES ABSOLUTE: 0.6 K/CU MM
MONOCYTES RELATIVE PERCENT: 4.1 % (ref 0–4)
NUCLEATED RBC %: 0 %
PDW BLD-RTO: 18.6 % (ref 11.7–14.9)
PLATELET # BLD: 273 K/CU MM (ref 140–440)
PMV BLD AUTO: 12 FL (ref 7.5–11.1)
POTASSIUM SERPL-SCNC: 3.8 MMOL/L (ref 3.5–5.1)
RBC # BLD: 5.53 M/CU MM (ref 4.2–5.4)
REASON FOR REJECTION: NORMAL
REJECTED TEST: NORMAL
SEGMENTED NEUTROPHILS ABSOLUTE COUNT: 12.8 K/CU MM
SEGMENTED NEUTROPHILS RELATIVE PERCENT: 92.7 % (ref 36–66)
SODIUM BLD-SCNC: 135 MMOL/L (ref 135–145)
TOTAL IMMATURE NEUTOROPHIL: 0.06 K/CU MM
TOTAL NUCLEATED RBC: 0 K/CU MM
TOTAL PROTEIN: 7.7 GM/DL (ref 6.4–8.2)
TROPONIN T: <0.01 NG/ML
WBC # BLD: 13.8 K/CU MM (ref 4–10.5)

## 2021-01-01 PROCEDURE — 2580000003 HC RX 258: Performed by: INTERNAL MEDICINE

## 2021-01-01 PROCEDURE — 2700000000 HC OXYGEN THERAPY PER DAY

## 2021-01-01 PROCEDURE — 6360000002 HC RX W HCPCS: Performed by: FAMILY MEDICINE

## 2021-01-01 PROCEDURE — 94761 N-INVAS EAR/PLS OXIMETRY MLT: CPT

## 2021-01-01 PROCEDURE — 1200000000 HC SEMI PRIVATE

## 2021-01-01 PROCEDURE — 70498 CT ANGIOGRAPHY NECK: CPT

## 2021-01-01 PROCEDURE — 80162 ASSAY OF DIGOXIN TOTAL: CPT

## 2021-01-01 PROCEDURE — 80053 COMPREHEN METABOLIC PANEL: CPT

## 2021-01-01 PROCEDURE — 93005 ELECTROCARDIOGRAM TRACING: CPT | Performed by: EMERGENCY MEDICINE

## 2021-01-01 PROCEDURE — 70450 CT HEAD/BRAIN W/O DYE: CPT

## 2021-01-01 PROCEDURE — 1250000000 HC SEMI PRIVATE HOSPICE R&B

## 2021-01-01 PROCEDURE — 99284 EMERGENCY DEPT VISIT MOD MDM: CPT

## 2021-01-01 PROCEDURE — 2580000003 HC RX 258: Performed by: FAMILY MEDICINE

## 2021-01-01 PROCEDURE — 84484 ASSAY OF TROPONIN QUANT: CPT

## 2021-01-01 PROCEDURE — 6360000002 HC RX W HCPCS: Performed by: INTERNAL MEDICINE

## 2021-01-01 PROCEDURE — 36415 COLL VENOUS BLD VENIPUNCTURE: CPT

## 2021-01-01 PROCEDURE — 2500000003 HC RX 250 WO HCPCS: Performed by: INTERNAL MEDICINE

## 2021-01-01 PROCEDURE — G8417 CALC BMI ABV UP PARAM F/U: HCPCS | Performed by: NURSE PRACTITIONER

## 2021-01-01 PROCEDURE — 2500000003 HC RX 250 WO HCPCS: Performed by: FAMILY MEDICINE

## 2021-01-01 PROCEDURE — 1123F ACP DISCUSS/DSCN MKR DOCD: CPT | Performed by: NURSE PRACTITIONER

## 2021-01-01 PROCEDURE — 6360000002 HC RX W HCPCS: Performed by: NURSE PRACTITIONER

## 2021-01-01 PROCEDURE — 85025 COMPLETE CBC W/AUTO DIFF WBC: CPT

## 2021-01-01 PROCEDURE — 71045 X-RAY EXAM CHEST 1 VIEW: CPT

## 2021-01-01 PROCEDURE — 1090F PRES/ABSN URINE INCON ASSESS: CPT | Performed by: NURSE PRACTITIONER

## 2021-01-01 PROCEDURE — 85610 PROTHROMBIN TIME: CPT

## 2021-01-01 PROCEDURE — G8427 DOCREV CUR MEDS BY ELIG CLIN: HCPCS | Performed by: NURSE PRACTITIONER

## 2021-01-01 PROCEDURE — 1036F TOBACCO NON-USER: CPT | Performed by: NURSE PRACTITIONER

## 2021-01-01 PROCEDURE — 4040F PNEUMOC VAC/ADMIN/RCVD: CPT | Performed by: NURSE PRACTITIONER

## 2021-01-01 PROCEDURE — 99212 OFFICE O/P EST SF 10 MIN: CPT | Performed by: NURSE PRACTITIONER

## 2021-01-01 PROCEDURE — 6360000004 HC RX CONTRAST MEDICATION: Performed by: EMERGENCY MEDICINE

## 2021-01-01 PROCEDURE — 93010 ELECTROCARDIOGRAM REPORT: CPT | Performed by: INTERNAL MEDICINE

## 2021-01-01 PROCEDURE — G8484 FLU IMMUNIZE NO ADMIN: HCPCS | Performed by: NURSE PRACTITIONER

## 2021-01-01 RX ORDER — MORPHINE SULFATE 4 MG/ML
4 INJECTION, SOLUTION INTRAMUSCULAR; INTRAVENOUS EVERY 6 HOURS SCHEDULED
Status: DISCONTINUED | OUTPATIENT
Start: 2021-01-01 | End: 2021-01-01 | Stop reason: HOSPADM

## 2021-01-01 RX ORDER — SODIUM CHLORIDE 0.9 % (FLUSH) 0.9 %
10 SYRINGE (ML) INJECTION PRN
Status: DISCONTINUED | OUTPATIENT
Start: 2021-01-01 | End: 2021-01-01 | Stop reason: HOSPADM

## 2021-01-01 RX ORDER — ACETAMINOPHEN 650 MG/1
650 SUPPOSITORY RECTAL EVERY 4 HOURS PRN
Status: CANCELLED | OUTPATIENT
Start: 2021-01-01

## 2021-01-01 RX ORDER — DIGOXIN 0.25 MG/ML
125 INJECTION INTRAMUSCULAR; INTRAVENOUS DAILY
Status: DISCONTINUED | OUTPATIENT
Start: 2021-01-01 | End: 2021-01-01 | Stop reason: HOSPADM

## 2021-01-01 RX ORDER — DIGOXIN 0.25 MG/ML
125 INJECTION INTRAMUSCULAR; INTRAVENOUS DAILY
Status: CANCELLED | OUTPATIENT
Start: 2021-01-01

## 2021-01-01 RX ORDER — GLYCOPYRROLATE 1 MG/5 ML
0.2 SYRINGE (ML) INTRAVENOUS EVERY 4 HOURS PRN
Status: DISCONTINUED | OUTPATIENT
Start: 2021-01-01 | End: 2021-01-01 | Stop reason: HOSPADM

## 2021-01-01 RX ORDER — HYDROXYZINE HYDROCHLORIDE 50 MG/ML
50 INJECTION, SOLUTION INTRAMUSCULAR ONCE
Status: COMPLETED | OUTPATIENT
Start: 2021-01-01 | End: 2021-01-01

## 2021-01-01 RX ORDER — DEXTROSE, SODIUM CHLORIDE, AND POTASSIUM CHLORIDE 5; .45; .15 G/100ML; G/100ML; G/100ML
INJECTION INTRAVENOUS CONTINUOUS
Status: DISCONTINUED | OUTPATIENT
Start: 2021-01-01 | End: 2021-01-01

## 2021-01-01 RX ORDER — SODIUM CHLORIDE 0.9 % (FLUSH) 0.9 %
10 SYRINGE (ML) INJECTION EVERY 12 HOURS SCHEDULED
Status: CANCELLED | OUTPATIENT
Start: 2021-01-01

## 2021-01-01 RX ORDER — POLYVINYL ALCOHOL 14 MG/ML
1 SOLUTION/ DROPS OPHTHALMIC
Status: DISCONTINUED | OUTPATIENT
Start: 2021-01-01 | End: 2021-01-01 | Stop reason: HOSPADM

## 2021-01-01 RX ORDER — DIGOXIN 0.25 MG/ML
250 INJECTION INTRAMUSCULAR; INTRAVENOUS ONCE
Status: DISCONTINUED | OUTPATIENT
Start: 2021-01-01 | End: 2021-01-01 | Stop reason: SDUPTHER

## 2021-01-01 RX ORDER — DIGOXIN 0.25 MG/ML
250 INJECTION INTRAMUSCULAR; INTRAVENOUS ONCE
Status: DISCONTINUED | OUTPATIENT
Start: 2021-01-01 | End: 2021-01-01

## 2021-01-01 RX ORDER — MORPHINE SULFATE 2 MG/ML
2 INJECTION, SOLUTION INTRAMUSCULAR; INTRAVENOUS EVERY 4 HOURS PRN
Status: DISCONTINUED | OUTPATIENT
Start: 2021-01-01 | End: 2021-01-01

## 2021-01-01 RX ORDER — POLYETHYLENE GLYCOL 3350 17 G/17G
17 POWDER, FOR SOLUTION ORAL DAILY PRN
Status: DISCONTINUED | OUTPATIENT
Start: 2021-01-01 | End: 2021-01-01

## 2021-01-01 RX ORDER — DIGOXIN 0.25 MG/ML
125 INJECTION INTRAMUSCULAR; INTRAVENOUS DAILY
Status: DISCONTINUED | OUTPATIENT
Start: 2021-01-01 | End: 2021-01-01 | Stop reason: SDUPTHER

## 2021-01-01 RX ORDER — ACETAMINOPHEN 650 MG/1
650 SUPPOSITORY RECTAL EVERY 4 HOURS PRN
Status: DISCONTINUED | OUTPATIENT
Start: 2021-01-01 | End: 2021-01-01 | Stop reason: HOSPADM

## 2021-01-01 RX ORDER — DIGOXIN 0.25 MG/ML
250 INJECTION INTRAMUSCULAR; INTRAVENOUS ONCE
Status: CANCELLED | OUTPATIENT
Start: 2021-01-01

## 2021-01-01 RX ORDER — MORPHINE SULFATE 2 MG/ML
2 INJECTION, SOLUTION INTRAMUSCULAR; INTRAVENOUS
Status: DISCONTINUED | OUTPATIENT
Start: 2021-01-01 | End: 2021-01-01 | Stop reason: HOSPADM

## 2021-01-01 RX ORDER — ONDANSETRON 2 MG/ML
4 INJECTION INTRAMUSCULAR; INTRAVENOUS EVERY 6 HOURS PRN
Status: CANCELLED | OUTPATIENT
Start: 2021-01-01

## 2021-01-01 RX ORDER — LORAZEPAM 2 MG/ML
0.5 INJECTION INTRAMUSCULAR
Status: CANCELLED | OUTPATIENT
Start: 2021-01-01

## 2021-01-01 RX ORDER — BISACODYL 10 MG
10 SUPPOSITORY, RECTAL RECTAL DAILY PRN
Status: CANCELLED | OUTPATIENT
Start: 2021-01-01

## 2021-01-01 RX ORDER — POLYVINYL ALCOHOL 14 MG/ML
1 SOLUTION/ DROPS OPHTHALMIC
Status: CANCELLED | OUTPATIENT
Start: 2021-01-01

## 2021-01-01 RX ORDER — ONDANSETRON 2 MG/ML
4 INJECTION INTRAMUSCULAR; INTRAVENOUS EVERY 6 HOURS PRN
Status: DISCONTINUED | OUTPATIENT
Start: 2021-01-01 | End: 2021-01-01 | Stop reason: HOSPADM

## 2021-01-01 RX ORDER — SODIUM CHLORIDE 0.9 % (FLUSH) 0.9 %
10 SYRINGE (ML) INJECTION PRN
Status: CANCELLED | OUTPATIENT
Start: 2021-01-01

## 2021-01-01 RX ORDER — PROMETHAZINE HYDROCHLORIDE 25 MG/1
12.5 TABLET ORAL EVERY 6 HOURS PRN
Status: DISCONTINUED | OUTPATIENT
Start: 2021-01-01 | End: 2021-01-01

## 2021-01-01 RX ORDER — LORAZEPAM 2 MG/ML
0.5 INJECTION INTRAMUSCULAR
Status: DISCONTINUED | OUTPATIENT
Start: 2021-01-01 | End: 2021-01-01 | Stop reason: HOSPADM

## 2021-01-01 RX ORDER — METOPROLOL TARTRATE 5 MG/5ML
5 INJECTION INTRAVENOUS EVERY 6 HOURS
Status: DISCONTINUED | OUTPATIENT
Start: 2021-01-01 | End: 2021-01-01 | Stop reason: CLARIF

## 2021-01-01 RX ORDER — METOPROLOL TARTRATE 5 MG/5ML
5 INJECTION INTRAVENOUS EVERY 6 HOURS PRN
Status: DISCONTINUED | OUTPATIENT
Start: 2021-01-01 | End: 2021-01-01 | Stop reason: HOSPADM

## 2021-01-01 RX ORDER — METOPROLOL TARTRATE 5 MG/5ML
5 INJECTION INTRAVENOUS EVERY 6 HOURS
Status: DISCONTINUED | OUTPATIENT
Start: 2021-01-01 | End: 2021-01-01

## 2021-01-01 RX ORDER — SODIUM CHLORIDE 0.9 % (FLUSH) 0.9 %
10 SYRINGE (ML) INJECTION EVERY 12 HOURS SCHEDULED
Status: DISCONTINUED | OUTPATIENT
Start: 2021-01-01 | End: 2021-01-01 | Stop reason: HOSPADM

## 2021-01-01 RX ORDER — BISACODYL 10 MG
10 SUPPOSITORY, RECTAL RECTAL DAILY PRN
Status: DISCONTINUED | OUTPATIENT
Start: 2021-01-01 | End: 2021-01-01 | Stop reason: HOSPADM

## 2021-01-01 RX ORDER — GLYCOPYRROLATE 1 MG/5 ML
0.2 SYRINGE (ML) INTRAVENOUS EVERY 4 HOURS PRN
Status: CANCELLED | OUTPATIENT
Start: 2021-01-01

## 2021-01-01 RX ORDER — METOPROLOL TARTRATE 5 MG/5ML
5 INJECTION INTRAVENOUS EVERY 6 HOURS
Status: CANCELLED | OUTPATIENT
Start: 2021-01-01

## 2021-01-01 RX ORDER — MORPHINE SULFATE 2 MG/ML
2 INJECTION, SOLUTION INTRAMUSCULAR; INTRAVENOUS
Status: CANCELLED | OUTPATIENT
Start: 2021-01-01

## 2021-01-01 RX ADMIN — SODIUM CHLORIDE, PRESERVATIVE FREE 10 ML: 5 INJECTION INTRAVENOUS at 10:50

## 2021-01-01 RX ADMIN — MORPHINE SULFATE 2 MG: 2 INJECTION, SOLUTION INTRAMUSCULAR; INTRAVENOUS at 12:54

## 2021-01-01 RX ADMIN — Medication 0.2 MG: at 23:15

## 2021-01-01 RX ADMIN — LORAZEPAM 0.5 MG: 2 INJECTION INTRAMUSCULAR; INTRAVENOUS at 23:16

## 2021-01-01 RX ADMIN — SODIUM CHLORIDE, PRESERVATIVE FREE 10 ML: 5 INJECTION INTRAVENOUS at 08:39

## 2021-01-01 RX ADMIN — DIGOXIN 125 MCG: 0.25 INJECTION INTRAMUSCULAR; INTRAVENOUS at 08:31

## 2021-01-01 RX ADMIN — LORAZEPAM 0.5 MG: 2 INJECTION INTRAMUSCULAR; INTRAVENOUS at 20:27

## 2021-01-01 RX ADMIN — POTASSIUM CHLORIDE, DEXTROSE MONOHYDRATE AND SODIUM CHLORIDE: 150; 5; 450 INJECTION, SOLUTION INTRAVENOUS at 23:43

## 2021-01-01 RX ADMIN — Medication 0.2 MG: at 12:54

## 2021-01-01 RX ADMIN — MORPHINE SULFATE 2 MG: 2 INJECTION, SOLUTION INTRAMUSCULAR; INTRAVENOUS at 08:28

## 2021-01-01 RX ADMIN — MORPHINE SULFATE 2 MG: 2 INJECTION, SOLUTION INTRAMUSCULAR; INTRAVENOUS at 18:13

## 2021-01-01 RX ADMIN — LORAZEPAM 0.5 MG: 2 INJECTION INTRAMUSCULAR; INTRAVENOUS at 03:14

## 2021-01-01 RX ADMIN — MORPHINE SULFATE 2 MG: 2 INJECTION, SOLUTION INTRAMUSCULAR; INTRAVENOUS at 20:27

## 2021-01-01 RX ADMIN — SODIUM CHLORIDE, PRESERVATIVE FREE 10 ML: 5 INJECTION INTRAVENOUS at 21:56

## 2021-01-01 RX ADMIN — MORPHINE SULFATE 2 MG: 2 INJECTION, SOLUTION INTRAMUSCULAR; INTRAVENOUS at 04:56

## 2021-01-01 RX ADMIN — METOPROLOL TARTRATE 5 MG: 5 INJECTION INTRAVENOUS at 19:09

## 2021-01-01 RX ADMIN — LORAZEPAM 0.5 MG: 2 INJECTION INTRAMUSCULAR; INTRAVENOUS at 14:54

## 2021-01-01 RX ADMIN — LORAZEPAM 0.5 MG: 2 INJECTION INTRAMUSCULAR; INTRAVENOUS at 08:32

## 2021-01-01 RX ADMIN — MORPHINE SULFATE 2 MG: 2 INJECTION, SOLUTION INTRAMUSCULAR; INTRAVENOUS at 17:18

## 2021-01-01 RX ADMIN — SODIUM CHLORIDE, PRESERVATIVE FREE 10 ML: 5 INJECTION INTRAVENOUS at 10:33

## 2021-01-01 RX ADMIN — POTASSIUM CHLORIDE, DEXTROSE MONOHYDRATE AND SODIUM CHLORIDE: 150; 5; 450 INJECTION, SOLUTION INTRAVENOUS at 19:49

## 2021-01-01 RX ADMIN — MORPHINE SULFATE 2 MG: 2 INJECTION, SOLUTION INTRAMUSCULAR; INTRAVENOUS at 03:13

## 2021-01-01 RX ADMIN — Medication 0.2 MG: at 06:12

## 2021-01-01 RX ADMIN — MORPHINE SULFATE 2 MG: 2 INJECTION, SOLUTION INTRAMUSCULAR; INTRAVENOUS at 18:01

## 2021-01-01 RX ADMIN — SODIUM CHLORIDE, PRESERVATIVE FREE 10 ML: 5 INJECTION INTRAVENOUS at 20:59

## 2021-01-01 RX ADMIN — MORPHINE SULFATE 2 MG: 2 INJECTION, SOLUTION INTRAMUSCULAR; INTRAVENOUS at 06:13

## 2021-01-01 RX ADMIN — HYDROXYZINE HYDROCHLORIDE 50 MG: 50 INJECTION, SOLUTION INTRAMUSCULAR at 01:23

## 2021-01-01 RX ADMIN — MORPHINE SULFATE 4 MG: 4 INJECTION, SOLUTION INTRAMUSCULAR; INTRAVENOUS at 13:07

## 2021-01-01 RX ADMIN — Medication 0.2 MG: at 04:55

## 2021-01-01 RX ADMIN — MORPHINE SULFATE 2 MG: 2 INJECTION, SOLUTION INTRAMUSCULAR; INTRAVENOUS at 23:15

## 2021-01-01 RX ADMIN — SODIUM CHLORIDE, PRESERVATIVE FREE 10 ML: 5 INJECTION INTRAVENOUS at 08:55

## 2021-01-01 RX ADMIN — LORAZEPAM 0.5 MG: 2 INJECTION INTRAMUSCULAR; INTRAVENOUS at 05:23

## 2021-01-01 RX ADMIN — METOPROLOL TARTRATE 5 MG: 5 INJECTION INTRAVENOUS at 12:11

## 2021-01-01 RX ADMIN — MORPHINE SULFATE 2 MG: 2 INJECTION, SOLUTION INTRAMUSCULAR; INTRAVENOUS at 14:54

## 2021-01-01 RX ADMIN — LORAZEPAM 0.5 MG: 2 INJECTION INTRAMUSCULAR; INTRAVENOUS at 10:33

## 2021-01-01 RX ADMIN — MORPHINE SULFATE 2 MG: 2 INJECTION, SOLUTION INTRAMUSCULAR; INTRAVENOUS at 05:23

## 2021-01-01 RX ADMIN — IOPAMIDOL 85 ML: 755 INJECTION, SOLUTION INTRAVENOUS at 17:36

## 2021-01-01 RX ADMIN — SODIUM CHLORIDE, PRESERVATIVE FREE 10 ML: 5 INJECTION INTRAVENOUS at 22:00

## 2021-01-01 RX ADMIN — MORPHINE SULFATE 2 MG: 2 INJECTION, SOLUTION INTRAMUSCULAR; INTRAVENOUS at 22:01

## 2021-01-01 RX ADMIN — MORPHINE SULFATE 2 MG: 2 INJECTION, SOLUTION INTRAMUSCULAR; INTRAVENOUS at 10:33

## 2021-01-01 RX ADMIN — METOPROLOL TARTRATE 5 MG: 5 INJECTION INTRAVENOUS at 01:14

## 2021-01-01 RX ADMIN — Medication 0.2 MG: at 18:05

## 2021-01-01 RX ADMIN — SODIUM CHLORIDE, PRESERVATIVE FREE 10 ML: 5 INJECTION INTRAVENOUS at 08:30

## 2021-01-01 RX ADMIN — POTASSIUM CHLORIDE, DEXTROSE MONOHYDRATE AND SODIUM CHLORIDE: 150; 5; 450 INJECTION, SOLUTION INTRAVENOUS at 10:14

## 2021-01-01 RX ADMIN — LORAZEPAM 0.5 MG: 2 INJECTION INTRAMUSCULAR; INTRAVENOUS at 12:54

## 2021-01-01 RX ADMIN — LORAZEPAM 0.5 MG: 2 INJECTION INTRAMUSCULAR; INTRAVENOUS at 19:02

## 2021-01-01 RX ADMIN — MORPHINE SULFATE 2 MG: 2 INJECTION, SOLUTION INTRAMUSCULAR; INTRAVENOUS at 08:55

## 2021-01-01 RX ADMIN — DIGOXIN 125 MCG: 0.25 INJECTION INTRAMUSCULAR; INTRAVENOUS at 08:27

## 2021-01-01 RX ADMIN — LORAZEPAM 0.5 MG: 2 INJECTION INTRAMUSCULAR; INTRAVENOUS at 08:28

## 2021-01-01 RX ADMIN — LORAZEPAM 0.5 MG: 2 INJECTION INTRAMUSCULAR; INTRAVENOUS at 06:13

## 2021-01-01 RX ADMIN — METOPROLOL TARTRATE 5 MG: 5 INJECTION INTRAVENOUS at 05:08

## 2021-01-01 RX ADMIN — Medication 0.2 MG: at 08:31

## 2021-01-01 RX ADMIN — LORAZEPAM 0.5 MG: 2 INJECTION INTRAMUSCULAR; INTRAVENOUS at 10:50

## 2021-01-01 RX ADMIN — MORPHINE SULFATE 2 MG: 2 INJECTION, SOLUTION INTRAMUSCULAR; INTRAVENOUS at 08:32

## 2021-01-01 RX ADMIN — MORPHINE SULFATE 2 MG: 2 INJECTION, SOLUTION INTRAMUSCULAR; INTRAVENOUS at 10:50

## 2021-01-01 RX ADMIN — DIGOXIN 250 MCG: 0.25 INJECTION INTRAMUSCULAR; INTRAVENOUS at 08:55

## 2021-01-01 RX ADMIN — LORAZEPAM 0.5 MG: 2 INJECTION INTRAMUSCULAR; INTRAVENOUS at 13:07

## 2021-01-01 ASSESSMENT — PAIN SCALES - WONG BAKER

## 2021-01-01 ASSESSMENT — PAIN SCALES - GENERAL
PAINLEVEL_OUTOF10: 0
PAINLEVEL_OUTOF10: 0
PAINLEVEL_OUTOF10: 4
PAINLEVEL_OUTOF10: 0

## 2021-01-01 ASSESSMENT — ENCOUNTER SYMPTOMS
CONSTIPATION: 0
SHORTNESS OF BREATH: 0
BLOOD IN STOOL: 0
PHOTOPHOBIA: 0
EYE PAIN: 0
BACK PAIN: 0
ABDOMINAL PAIN: 0
COLOR CHANGE: 0
DIARRHEA: 0
WHEEZING: 0
NAUSEA: 0
CHEST TIGHTNESS: 0
VOMITING: 0
COUGH: 0

## 2021-01-04 NOTE — TELEPHONE ENCOUNTER
Spoke with patient for greater than 20 min. Called to advise patient of cost of Eliquis and Xarelto. Both medications are approximately $360 for a 3 month supply. Per Tera Cuellar, patient will need to switch to one of them after cardioversion scheduled on 1/18. Patient is currently on coumadin. Patient will possibly be started on antiarrythmic post cardioversion and she can not take coumadin and amiodarone together. Advised patient she will either have a 3 day hospital stay to start Tikosyn or Sotalol if she wants to stay on Coumadin, OR she will need to switch to Eliquis or Xarelto if she wants to avoid hospital stay and start Amiodarone. Answered several questions regarding A-Fib, cardioversion and medications with patient. She states she is over whelled and really needs someone to explain to her why she needs to have this procedure done. Offered to schedule OV with Brittney Ayala CNP to discuss treatment further. Patient needs to contact her transportation to see when she would be available to have OV with Brittney Ayala. She definitely wants an appt to discuss treatment further. She will call back when she knows what day she can come in.

## 2021-01-12 PROBLEM — I48.19 PERSISTENT ATRIAL FIBRILLATION (HCC): Status: ACTIVE | Noted: 2021-01-01

## 2021-01-12 NOTE — PROGRESS NOTES
Electrophysiology Note      Reason for consultation:  Atrial fibrillation    Chief complaint: atrial fibrillation    Referring physician: Dr. Cory Heller      Primary care physician: Ignacio Lechuga MD      History of Present Illness: This visit 1/12/2021  Patient is here today to discuss management of atrial fibrillation. She has seen Dr Yajaira Cueto in the past who recommended cardioversion with Sotalol or Tikosyn monitoring therapy. Patient is unsure if she wants to have a procedure due to Covid-19. She would like to wait until after she receives her vaccine. At this time her complaint is fatigue. She denies chest pain, palpitations, shortness of breath, edema, dizziness, or syncope. Previous visit  Patient is a 25-year-old female with history of recent CVA referred by Dr. Cory Heller for atrial fibrillation. Patient reports she is very independent. In September she had strokelike symptoms and then came to the hospital patient was placed on Coumadin since then patient is having intermittent palpitations and shortness of breath. Patient feels that she has gone downhill since then. Patient reports she lives alone and has no family around. Patient came with a friend today. 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 She was a  previously. Patient feels tired and weak has no energy. She has shortness of breath with minimal exertion.   She denies any chest pain, dizziness or syncope      Pastmedical history:   Past Medical History:   Diagnosis Date    Cancer ([facility] [address]     Hypertension     Osteoporosis     TIA (transient ischemic attack)        Surgical history :   Past Surgical History:   Procedure Laterality Date    BREAST SURGERY         Family history: No sudden cardiac death Social history :  reports that she has never smoked. She has never used smokeless tobacco. She reports that she does not drink alcohol or use drugs. Allergies   Allergen Reactions    Statins      Muscle pain       Current Outpatient Medications on File Prior to Visit   Medication Sig Dispense Refill    digoxin (LANOXIN) 125 MCG tablet Take 125 mcg by mouth daily       metoprolol tartrate (LOPRESSOR) 50 MG tablet Take 1 tablet by mouth 2 times daily 60 tablet 3    warfarin (COUMADIN) 4 MG tablet Take 1 tablet by mouth every evening 30 tablet 0    levothyroxine (SYNTHROID) 112 MCG tablet Take 112 mcg by mouth Daily. No current facility-administered medications on file prior to visit. Review of Systems:   Review of Systems   Constitutional: Negative for activity change, chills, fatigue and fever. HENT: Negative for congestion, ear pain and tinnitus. Eyes: Negative for photophobia, pain and visual disturbance. Respiratory: Negative for cough, chest tightness, shortness of breath and wheezing. Cardiovascular: Negative for chest pain, palpitations and leg swelling. Gastrointestinal: Negative for abdominal pain, blood in stool, constipation, diarrhea, nausea and vomiting. Endocrine: Negative for cold intolerance and heat intolerance. Genitourinary: Negative for dysuria, flank pain and hematuria. Musculoskeletal: Positive for arthralgias and gait problem (walks with a cane). Negative for back pain, myalgias and neck stiffness. Skin: Negative for color change and rash. Allergic/Immunologic: Negative for food allergies. Neurological: Negative for dizziness, light-headedness, numbness and headaches. Hematological: Does not bruise/bleed easily. Psychiatric/Behavioral: Negative for agitation, behavioral problems and confusion.            Examination:      Vitals:    01/12/21 1113   BP: 138/80   Pulse: 75   Temp: 96.8 °F (36 °C)   SpO2: 95%   Weight: 136 lb (61.7 kg) Height: 5' (1.524 m)        Body mass index is 26.56 kg/m². Physical Exam  Constitutional:       Appearance: She is well-developed. She is not ill-appearing or diaphoretic. HENT:      Head: Normocephalic and atraumatic. Right Ear: External ear normal.      Left Ear: External ear normal.      Nose: No congestion or rhinorrhea. Mouth/Throat:      Mouth: Mucous membranes are moist.      Pharynx: Oropharynx is clear. Eyes:      General:         Right eye: No discharge. Left eye: No discharge. Conjunctiva/sclera: Conjunctivae normal.      Pupils: Pupils are equal, round, and reactive to light. Neck:      Musculoskeletal: Neck supple. No muscular tenderness. Thyroid: No thyromegaly. Vascular: No carotid bruit or JVD. Cardiovascular:      Rate and Rhythm: Normal rate. Rhythm irregular. Heart sounds: Normal heart sounds. No murmur. No friction rub. Pulmonary:      Effort: Pulmonary effort is normal. No respiratory distress. Breath sounds: Normal breath sounds. No stridor. No wheezing. Abdominal:      General: Bowel sounds are normal. There is no distension. Palpations: Abdomen is soft. Tenderness: There is no abdominal tenderness. Musculoskeletal: Normal range of motion. General: No tenderness. Right lower leg: No edema. Left lower leg: No edema. Skin:     General: Skin is warm and dry. Capillary Refill: Capillary refill takes less than 2 seconds. Neurological:      General: No focal deficit present. Mental Status: She is alert and oriented to person, place, and time.    Psychiatric:         Mood and Affect: Mood normal.         Behavior: Behavior normal.               CBC:   Lab Results   Component Value Date    WBC 6.5 10/30/2020    HGB 12.2 10/30/2020    HCT 36.7 10/30/2020     10/30/2020     Lipids:  Lab Results   Component Value Date    CHOL 166 10/25/2020    TRIG 78 10/25/2020    HDL 53 10/25/2020 Please note this report has been partially produced using speech recognition software and may contain errors related to that system including errors in grammar, punctuation, and spelling, as well as words and phrases that may be inappropriate. If there are any questions or concerns please feel free to contact the dictating provider for clarification.

## 2021-03-15 PROBLEM — I63.9 ACUTE CEREBROVASCULAR ACCIDENT (CVA) (HCC): Status: ACTIVE | Noted: 2021-01-01

## 2021-03-15 NOTE — ED PROVIDER NOTES
Emergency Department Encounter    Patient: Nora Keys  MRN: [id number]  : 1928  Date of Evaluation: 3/15/2021  ED Provider:  Zuly Candelario    Triage Chief Complaint:   Cerebrovascular Accident      [place]:  Nora Keys is a 80 y.o. female that presents to the emergency department for evaluation of possible stroke. Patient is brought in via EMS reports initially provided by paramedics to state that they were dispatched to the patient secondary to strokelike symptoms. Last known well around 7:30 AM.  Patiently usually opens the door to get her newspaper around 7:30 AM, did the same today. Upon coming back in the home, suspected that patient had weakness bilateral upper extremities. She fell. Family noticed focal weakness. Patient does have a history of stroke, TIA. Due to concern, EMS was called for transport to the hospital.  Upon arrival, patient is dysarthric and aphasic. She does have noticeable left upper and lower extremity weakness with a right-sided facial droop. She does take Coumadin. No known precipitating, modifying, alleviating features. Further history is limited secondary to patient's mental status. ROS - see HPI, below listed is current ROS at time of my eval:  A complete 14 point review of systems was performed and is as dictated above, otherwise negative. Past Medical History:   Diagnosis Date    Cancer ([address]     Hypertension     Osteoporosis     TIA (transient ischemic attack)        Past Surgical History:   Procedure Laterality Date    BREAST SURGERY         No family history on file. Social History     Socioeconomic History    Marital status:       Spouse name: Not on file    Number of children: Not on file    Years of education: Not on file    Highest education level: Not on file   Occupational History    Not on file   Social Needs    Financial resource strain: Not on file    Food insecurity     Worry: Not on file     Inability: Not on file   Khanh Murdock Transportation needs     Medical: Not on file     Non-medical: Not on file   Tobacco Use    Smoking status: Never Smoker    Smokeless tobacco: Never Used    Tobacco comment: 2 cups every morning. Drinks 3 or 4 cups daily.    Substance and Sexual Activity    Alcohol use: No    Drug use: No    Sexual activity: Not on file   Lifestyle    Physical activity     Days per week: Not on file     Minutes per session: Not on file    Stress: Not on file   Relationships    Social connections     Talks on phone: Not on file     Gets together: Not on file     Attends Anglican service: Not on file     Active member of club or organization: Not on file     Attends meetings of clubs or organizations: Not on file     Relationship status: Not on file    Intimate partner violence     Fear of current or ex partner: Not on file     Emotionally abused: Not on file     Physically abused: Not on file     Forced sexual activity: Not on file   Other Topics Concern    Not on file   Social History Narrative    Not on file       Current Facility-Administered Medications   Medication Dose Route Frequency Provider Last Rate Last Admin    sodium chloride flush 0.9 % injection 10 mL  10 mL Intravenous 2 times per day Sanam Ashley MD        sodium chloride flush 0.9 % injection 10 mL  10 mL Intravenous PRN Sanam Ashley MD        promethazine (PHENERGAN) tablet 12.5 mg  12.5 mg Oral Q6H PRN Sanam Ashley MD        Or    ondansetron (ZOFRAN) injection 4 mg  4 mg Intravenous Q6H PRN Sanam Ashley MD        polyethylene glycol (GLYCOLAX) packet 17 g  17 g Oral Daily PRN Sanam Ashley MD        morphine (PF) injection 2 mg  2 mg Intravenous Q4H PRN Sanam Ashley MD        dextrose 5 % and 0.45 % NaCl with KCl 20 mEq infusion   Intravenous Continuous Sanam Ashley MD           Allergies   Allergen Reactions    Statins      Muscle pain       Nursing Notes Reviewed    Physical Exam:  Triage VS:    ED Triage Vitals   Enc Vitals Group      BP  140/86      Pulse  121      Resp  23      Temp       Temp src       SpO2  95% 2 L supplemental oxygen      My pulse ox interpretation is - normal    General appearance: Patient is alert. Appears frail. He is unable to answer questions. She is able to follow commands by squeezing in the right upper extremity. Skin:  Warm. Dry. Intact. No herpes zoster lesions. Eye: Pupils are equal, round, reactive. No horizontal, vertical, rotary nystagmus. No gaze deviation. Funduscopic exam reveals no papilledema. Disc margins are clear. Positive spontaneous venous pulsations. No Jeronimo sign is apparent. No proptosis is appreciated. Head, ears, nose, mouth and throat: Head is normocephalic and atraumatic. No external masses or lesions. No nasal drainage. Airways patent. No tenderness to palpation of the temporal arteries bilaterally. There is no jaw or temporomandibular joint tenderness or trauma. Neck: Supple. No nuchal rigidity. No meningeal signs. Trachea is midline. No masses or thyromegaly. No JVD. No carotid thrills or bruits. Extremity:  No clubbing, cyanosis, or edema. Weakness in the left upper and lower extremity. Heart: Tachycardic rate and atrial fibrillation rhythm. Audible S1 and S2. No audible murmurs, rubs, gallops. Perfusion: Symmetric peripheral pulses. Brisk capillary refill. Compartments are soft and compressible. Respiratory: Respirations nonlabored. No rales, rhonchi, wheezes. Abdominal:   Soft. Non-peritoneal.  Bowel sounds in all 4 quadrants. No midline pulsatile abdominal masses. Neurological:     NIH Stroke Scale  NIH Stroke Scale Assessed: Yes  Interval: 2 hrs Posttreatment  Level of Consciousness (1a. ): Alert  LOC Questions (1b. ):  Answers neither question correctly  LOC Commands (1c. ): Performs one task correctly  Best Gaze (2. ): Normal  Visual (3. ): No visual loss  Facial Palsy (4. ): (!) Partial paralysis  Motor Arm, Left (5a. ): No effort against gravity  Motor Arm, Right (5b. ): Drift, but does not hit bed  Motor Leg, Left (6a. ): Some effort against gravity  Motor Leg, Right (6b. ): Drift, but does not hit bed  Limb Ataxia (7. ): Absent  Sensory (8. ): (!) Mild to Moderate  Best Language (9. ): Mild to moderate aphasia  Dysarthria (10. ): Mild to moderate dysarthria  Extinction and Inattention (11): No abnormality  Total: 15  Negative Kernig and Brudzinski signs. Psychiatric: Cooperative. I have reviewed and interpreted all of the currently available diagnostic results from this visit    Labs:  Results for orders placed or performed during the hospital encounter of 03/15/21   EKG 12 Lead   Result Value Ref Range    Ventricular Rate 131 BPM    Atrial Rate 129 BPM    QRS Duration 70 ms    Q-T Interval 308 ms    QTc Calculation (Bazett) 454 ms    R Axis -50 degrees    T Axis -12 degrees    Diagnosis       Atrial fibrillation with rapid ventricular response with premature ventricular or aberrantly conducted complexes  Left axis deviation  Anterior infarct (cited on or before 20-SEP-2020)  Abnormal ECG  When compared with ECG of 25-OCT-2020 01:05,  No significant change was found          Radiographs:  Ct Head Wo Contrast    Result Date: 3/15/2021  EXAMINATION: CT OF THE HEAD WITHOUT CONTRAST  3/15/2021 3:04 pm TECHNIQUE: CT of the head was performed without the administration of intravenous contrast. Dose modulation, iterative reconstruction, and/or weight based adjustment of the mA/kV was utilized to reduce the radiation dose to as low as reasonably achievable. COMPARISON: CT head October 28, 2020 HISTORY: ORDERING SYSTEM PROVIDED HISTORY: CVA TECHNOLOGIST PROVIDED HISTORY: Has a \"code stroke\" or \"stroke alert\" been called? ->Yes Reason for exam:->CVA Decision Support Exception->Emergency Medical Condition (MA) Reason for Exam: stroke alert Acuity: Acute Type of Exam: Initial Additional signs and symptoms: cva FINDINGS: BRAIN/VENTRICLES: There are acute infarctions in the right frontal parietal lobes, and in right insula cortex. There is associated local mass effect without midline shift. There is old infarction in the left occipital lobe. There is old lacunar infarct in the right suprapatellar angle. There is mild parenchymal volume loss. There is periventricular white matter low attenuation, likely related to mild to moderate chronic microvascular disease. There is no acute intracranial hemorrhage. No abnormal extra-axial fluid collection. There is no hydrocephalus. ORBITS: The visualized portion of the orbits demonstrate no acute abnormality. SINUSES: The visualized paranasal sinuses and mastoid air cells demonstrate no acute abnormality. SOFT TISSUES/SKULL:  No acute abnormality of the visualized skull. There is soft tissue swelling of the left scalp, left face, left lateral neck. Acute infarctions in the right frontal parietal lobes, and in the right insula cortex, in the right MCA territory. Old infarction in the left occipital lobe. Old lacunar infarct in the right suprapatellar angle. Mild parenchymal volume loss. Mild to moderate chronic microvascular disease. Results were reported to Dr. Marilu Paulson at 3:18 p.m. on March 15, 2021. Cta Head Neck W Contrast    Result Date: 3/15/2021  EXAMINATION: CTA OF THE HEAD AND NECK WITH CONTRAST 3/15/2021 3:07 pm: TECHNIQUE: CTA of the head and neck was performed with the administration of intravenous contrast. Multiplanar reformatted images are provided for review. MIP images are provided for review. Stenosis of the internal carotid arteries measured using NASCET criteria. Dose modulation, iterative reconstruction, and/or weight based adjustment of the mA/kV was utilized to reduce the radiation dose to as low as reasonably achievable.  COMPARISON: October 24, 2020 HISTORY: ORDERING SYSTEM PROVIDED HISTORY: CVA TECHNOLOGIST PROVIDED HISTORY: Reason for exam:->CVA Reason for Exam: CVA Acuity: Acute Type of Exam: Initial Additional signs and symptoms: STROKE ALERT FINDINGS: CTA NECK: AORTIC ARCH/ARCH VESSELS: No dissection or arterial injury. No significant stenosis of the brachiocephalic or subclavian arteries. CAROTID ARTERIES: The left carotid is normal in course and caliber. The right internal carotid artery is occluded just beyond the bifurcation. VERTEBRAL ARTERIES: No dissection, arterial injury, or significant stenosis. SOFT TISSUES: There are moderately severe patchy ground-glass infiltrates within the visualized lung fields. BONES: Degenerative changes of the spine are present. CTA HEAD: ANTERIOR CIRCULATION: There is reconstitution of flow within the right supraclinoid ICA, likely filling across the anterior communicating artery. The anterior cerebral arteries and left MCA demonstrate normal arborization patterns. There is partial occlusion of the distal M1 segment of the right middle cerebral artery with attenuated flow within the M2 branches POSTERIOR CIRCULATION: No significant stenosis of the vertebral, basilar, or posterior cerebral arteries. No aneurysm. OTHER: No dural venous sinus thrombosis on this non-dedicated study. BRAIN: See separately dictated noncontrast head CT report. Occluded right cervical ICA. Partially occluded distal M1 segment of the right MCA with attenuated flow within the M2 branches. Moderately severe patchy ground-glass infiltrates within the lung apices, consistent with pneumonia in the appropriate clinical setting. Commonly reported imaging features of COVID-19 pneumonia are present. Other processes such as influenza pneumonia and organizing pneumonia, as can be seen with drug toxicity and connective tissue disease, can cause a similar imaging pattern. Findings were discussed with Sarita Burciaga at 6:16 pm on 3/15/2021. EKG: (All EKG's are interpreted by myself in the absence of a cardiologist).   EKG performed on 3/15/2021 at 1639 demonstrates ventricular rate of 131 bpm atrial fibrillation with rapid ventricular response and premature ventricular and aberrantly conducted complexes. Left axis deviation. No signs of acute ST segment elevation myocardial infarction. EKG compared to previous EKG performed on 10/25/2020 and is essentially unchanged. Stroke alert timing details:  1. Last Known Well: 3/15/2021 0730  2. Presented to Emergency Department: 3/15/2021 1505  3. Physician evaluation: 1311  2. Stroke alert called:  1505  5. CT results obtained:  6898  9. Case discussed with [facility] neurologist: [facility]  7. Decision to administer tPA:  NOT A CANDIDATE  8. Physician re-evaluation: 1800    t-PA NOT given due to the following EXCLUSION CRITERIA (only those listed): Symptoms > 3 hours of onset  -Current or recent use of anticoagulants (dabagtran, rivaroxaban, or warfarin with  INR > 1.7)      MDM:  Patient was seen and evaluated in the emergency department by myself. A thorough history and physical exam were performed, prior medical records were reviewed. Patient arrived to our emergency department on 3/15/21 at 1505. Last known well is 3/15/2021 at 0730. Upon arrival, comprehensive NIH stroke scale evaluation was completed by myself demonstrating NIH stroke scale score of 16. Blood glucose was within normal limits. Stroke alert was activated. Patient was immediately taken to CT scan. CT brain reviewed by myself demonstrating acute right MCA infarct. No hemorrhagic lesions appreciated. Radiology report read acute infarctions in the right frontal parietal lobes in the right insular cortex, right MCA territory. Old infarction in the left occipital lobe. Old lacunar infarct in the right supratentorial angle. Patient was brought back to the room. Was connected to continuous cardiopulmonary monitoring. Rhythm strip interpreted by myself demonstrating sinus rhythm. Vital signs are noted, patient is tachycardic.   Mildly tachypneic and hypoxic requiring supplemental oxygen. Given findings on CT, we do recommend consultation with [facility] stroke neurologist, emanuel - who is also POA - declines. I do discuss results of radiographic findings and concern for ischemic stroke. Although patient is not a candidate for TPA due to time since onset of symptoms and Coumadin coagulopathy, patient may be a candidate for thrombectomy. Emanuel declines. Notes that patient has had a long life with many health issues. She would not want neurosurgery or any similar procedures. Emanuel declines transfer to [facility] for stroke neurology evaluation. Request that we admit the patient to our hospital and maintain comfort care status. EKG is obtained, interpreted by myself, as detailed above. IV access had been established. Pertinent labs are drawn, currently pending. On repeat evaluations, patient remains stable. Remains in atrial fibrillation with tachycardic ventricular rate. Family would like to make her comfort care. Unable to initiate additional anticoagulation as patient has signs of ischemic CVA. I do not have a digoxin level back yet, have not initiated rate control medication. I think likely secondary to underlying process. Repeat neurologic exam demonstrates no additional neurologic sequelae. Repeat NIH stroke scale score is 16. Patient not a candidate for TPA. Results of laboratory and radiographic data along with differential diagnosis and treatment plan discussed with emanuel. Patient symptoms are consistent with acute CVA, MCA territory ischemic stroke. We recommend admission, emanuel requests admission to our hospital.  Case discussed with admitting hospitalist who is agreeable with treatment plan and accepts admission. Family is updated. Patient is admitted. After admission, CT angiography head and neck results. Radiology report reads cccluded right cervical ICA.  Partially occluded distal M1 segment of the right MCA with attenuated flow within the M2 branches. Moderately severe patchy ground-glass infiltrates within the lung apices, consistent with pneumonia in the appropriate clinical setting. Commonly reported imaging features of COVID-19 pneumonia are present. Other processes such as influenza pneumonia and organizing pneumonia, as can be seen with drug toxicity and connective tissue disease, can cause a similar imaging pattern. Family is updated. Again, request no additional intervention. Patient is [facility] admitting hospitalist is paged for update. Clinical Impression:  1. Acute CVA (cerebrovascular accident) ([address]    2. ICAO (internal carotid artery occlusion), right    3. Stroke due to occlusion of right middle cerebral artery ([address]    4. Atrial fibrillation with tachycardic ventricular rate ([address]    5. Warfarin-induced coagulopathy ([address]    6. Ground glass opacity present on imaging of lung        Critical Care Note:  Total critical care time provided today was 32 minutes. This excludes seperately billable procedures and family discussion time. Critical care time provided for obtaining history, conducting a physical exam, performing and monitoring interventions, ordering, collecting and interpreting tests, and establishing medical decision-making. There was a potential for life/limb threatening pathology requiring close evaluation and intervention with concern for patient decompensation. ED Provider Disposition:  DISPOSITION Admitted 03/15/2021 03:54:41 PM      Comment: Please note this report has been produced using speech recognition software and may contain errors related to that system including errors in grammar, punctuation, and spelling, as well as words and phrases that may be inappropriate. Efforts were made to edit the dictations.        Jude Moon,   03/15/21 1743

## 2021-03-15 NOTE — ED NOTES
Performed 2 venipuncture procedures along patient's right arm, only able to obtain troponin. 1st stick in right wrist (troponin level drawn), 2nd stick along underside of right forearm (return, no draw).       Kristina Fuller  03/15/21 3244

## 2021-03-15 NOTE — H&P
History and Physical      Name:  Nataly Jorgensen /Age/Sex: 1928  (80 y.o. female)   MRN & CSN:  [id number] & [id number] Admission Date/Time: 3/15/2021  3:08 PM   Location:  [place] [address] PCP: Maris Miranda MD       Hospital Day: 1    Assessment and Plan:   79 y/o F with PMH of atrial fibrillation, HTN, hypothyroidism, prior strokes in the past who presented with upper and lower extremity weakness, right sided facial droop, aphasia and dysarthria found to have a large acute CVA. Acute CVA  - Case d/w ED: Given patient's Coumadin hx patient not a candidate for TPA and family declined transfer to [facility] for thrombectomy; daughter and other family members request was to make the patient DNR CC with no further treatment. Family and daughter expressed this both to ED and myself at bedside.  - Will admit   - Hospice consulted  - DNR CC  - Gentle maintenance fluids overnight    Chronic Medical Conditions  HTN- permissive HTN   Atrial Fibrillation- Holding Coumadin to prevent hemorrhagic conversion  Hypothyroidism- holding levothyroxine given mental status    Hospice consulted, appreciate recs    Diet Diet NPO Effective Now   DVT Prophylaxis [] Lovenox, []  Heparin, [] SCDs, [] Ambulation   GI Prophylaxis [] PPI,  [] H2 Blocker,  [] Carafate,  [] Diet/Tube Feeds   Code Status Prior   Disposition Patient requires continued admission due to    MDM [] Low, [] Moderate,[]  High  Patient's risk as above due to      History of Present Illness:   79 y/o F with PMH of atrial fibrillation, HTN, hypothyroidism, prior strokes in the past who presented with upper and lower extremity weakness, right sided facial droop, aphasia and dysarthria found to have a large acute CVA. Patient was admitted as a stroke alert and CT H demonstrated acute infarcts in the right frontal parietal lobes and in the right insula cortex in the right MCA territory.   Stroke alert was called but due to patient being on Coumadin TPA was not Drinks 3 or 4 cups daily.    Substance and Sexual Activity    Alcohol use: No    Drug use: No    Sexual activity: Not on file   Lifestyle    Physical activity     Days per week: Not on file     Minutes per session: Not on file    Stress: Not on file   Relationships    Social connections     Talks on phone: Not on file     Gets together: Not on file     Attends Episcopal service: Not on file     Active member of club or organization: Not on file     Attends meetings of clubs or organizations: Not on file     Relationship status: Not on file    Intimate partner violence     Fear of current or ex partner: Not on file     Emotionally abused: Not on file     Physically abused: Not on file     Forced sexual activity: Not on file   Other Topics Concern    Not on file   Social History Narrative    Not on file       Medications:   Medications:    Infusions:   PRN Meds:       Electronically signed by Jerrell Dye MD on 3/15/2021 at 3:57 PM

## 2021-03-15 NOTE — ED NOTES
Performed 2 venipuncture procedures on patient, both attempts failed: 1st in right median AC surface, 2nd in left forearm.       Minh Alexander  03/15/21 1537

## 2021-03-16 NOTE — PROGRESS NOTES
PS Dr. Turner Filler:   3/15/21 6:48 PM   [phone] Hospital or Facility: [address] From: [place] RE: Tyrone Duarte RM: [id number] Do you want to do anything for pt hr 140's?, Do you want vitals q4hr, also q4 nih? Need Callback: NO CALLBACK REQ  Read 6:48 PM   Response:   3/15/21 6:51 PM   Shes comfort care so we dont need nih but we can do vitals q4 just to control heart rate.

## 2021-03-16 NOTE — PROGRESS NOTES
without significant tenderness, masses, or guarding. Bowel sounds are normoactive.   Martin catheter is not present. MSK No gross joint deformities. SKIN Normal coloration, warm, dry. NEURO  Expressive aphasia,Left arm and leg weakness, she moves the Rt UE spontaneously. PSYCH Awake, confused.      Medications:   Medications:    sodium chloride flush  10 mL Intravenous 2 times per day      Infusions:    dextrose 5% and 0.45% NaCl with KCl 20 mEq 75 mL/hr at 03/16/21 1014     PRN Meds: sodium chloride flush, 10 mL, PRN  promethazine, 12.5 mg, Q6H PRN    Or  ondansetron, 4 mg, Q6H PRN  polyethylene glycol, 17 g, Daily PRN  morphine, 2 mg, Q4H PRN  metoprolol, 5 mg, Q6H PRN        CBC   Recent Labs     03/15/21  1700   WBC 13.8*   HGB 14.9   HCT 47.0         BMP   Recent Labs     03/15/21  1700      K 3.8   CL 95*   CO2 23   BUN 20   CREATININE 0.5*       Radiology report reviewed       Electronically signed by Alexi Aguilar MD on 3/16/2021 at 3:01 PM

## 2021-03-16 NOTE — CONSULTS
[address] [place] [address] Consult Note    Date: 3/16/2021  Name: Jarocho Rodas  MRN: [id number]  YOB: 1928   Patient's PCP: Mina Bethea MD  Referring Physician: Dr. Vane Foster care admission date: 3/15/2021     Informant: Chart reviewed, discussed with the patient's nurses and case management. I examined the patient who is unable to provide any information due to clinical status. The patient's granddaughter was present during the night but left this morning. CC: stroke    [place]: This is a 80 y.o. female with history of atrial fibrillation, cerebrovascular disease with ischemic strokes in September and October 2020, hypertension, hypothyroidism, history of breast cancer,  Who was admitted on 3/15/2021 from home with acute stroke. The patient evidently developed weakness and speech difficulties in the morning of 3/15, and came to the ED at 1505. There is a left hemiparesis. A stroke alert was called, and the patient was not felt to be a candidate for thrombolytics due to time and Warfarin therapy (I do not see an INR resulted). Imaging shows right MCA infarct in right frontoparietal lobe and insula, and old stroke in left occipital lobe and old lacunar infarct on the right. CTA. CTA showed occluded right MCA segment. The patient's granddaughter declined transfer to a tertiary center for additional intervention, and the patient was admitted by the hospitalist. The patient is in atrial fibrillation with rapid ventricular response, and anticoagulation was held. Per ED notes and H+P, the granddaughter wants DNR-comfort care and hospice was consulted. The patient's granddaughter had stayed the night, but has left the hospital.        The patient opens her eyes to my voice and exam. She has left sided weakness and neglect.  Her speech is garbled, but she is able to accomplish simple commands and will squeeze my fingers with her right hand, close her eyes, and is able to left her left leg slightly but there is no movement of the left upper extremity. The patient is DNR-comfort care status. Past Medical History:   Diagnosis Date    Cancer ([facility] [address]     Hypertension     Osteoporosis     TIA (transient ischemic attack)        Past Surgical History:   Procedure Laterality Date    BREAST SURGERY         Social History     Socioeconomic History    Marital status:      Spouse name: Not on file    Number of children: Not on file    Years of education: Not on file    Highest education level: Not on file   Occupational History    Not on file   Social Needs    Financial resource strain: Not on file    Food insecurity     Worry: Not on file     Inability: Not on file    Transportation needs     Medical: Not on file     Non-medical: Not on file   Tobacco Use    Smoking status: Never Smoker    Smokeless tobacco: Never Used    Tobacco comment: 2 cups every morning. Drinks 3 or 4 cups daily. Substance and Sexual Activity    Alcohol use: No    Drug use: No    Sexual activity: Not on file   Lifestyle    Physical activity     Days per week: Not on file     Minutes per session: Not on file    Stress: Not on file   Relationships    Social connections     Talks on phone: Not on file     Gets together: Not on file     Attends Anabaptism service: Not on file     Active member of club or organization: Not on file     Attends meetings of clubs or organizations: Not on file     Relationship status: Not on file    Intimate partner violence     Fear of current or ex partner: Not on file     Emotionally abused: Not on file     Physically abused: Not on file     Forced sexual activity: Not on file   Other Topics Concern    Not on file   Social History Narrative    Not on file       No family history on file. Allergies   Allergen Reactions    Statins      Muscle pain       Medication list reviewed  Prior to Admission medications    Medication Sig Start Date End Date Taking?  Authorizing Provider digoxin (LANOXIN) 125 MCG tablet Take 125 mcg by mouth daily  11/19/20   Historical Provider, MD   metoprolol tartrate (LOPRESSOR) 50 MG tablet Take 1 tablet by mouth 2 times daily 12/23/20   Crista Bernheim, MD   warfarin (COUMADIN) 4 MG tablet Take 1 tablet by mouth every evening 10/30/20   Annette Sandhoff, MD   levothyroxine (SYNTHROID) 112 MCG tablet Take 112 mcg by mouth Daily. Historical Provider, MD       ROS: As noted in [address], all other systems are limited due to the patient's clinical condition   Constitutional: generally weak, no fever, chills, weight loss    Weight:    Wt Readings from Last 3 Encounters:   03/15/21 136 lb 0.4 oz (61.7 kg)   01/12/21 136 lb (61.7 kg)   12/23/20 139 lb (63 kg)       Data reviewed 3/16/2021:  Transthoracic Echocardiogram 9/22/20:   Left ventricular systolic function is normal.   Ejection fraction is visually estimated at 50-55%. Mild left ventricular hypertrophy. Grade I diastolic dysfunction. Sclerotic, but non-stenotic aortic valve. Moderate aortic regurgitation; PHT: 355 msec. Mitral annular calcification is present. Mild to moderate mitral regurgitation. No evidence of any pericardial effusion. CT-scan of the brain 3/15/21:  Acute infarctions in the right frontal parietal lobes, and in the right   insula cortex, in the right MCA territory. Old infarction in the left occipital lobe. Old lacunar infarct in the right suprapatellar angle. Mild parenchymal volume loss. Mild to moderate chronic microvascular disease. CTA head and neck 3/15/21:  Occluded right cervical ICA. Partially occluded distal M1 segment of the right MCA with attenuated flow   within the M2 branches. Moderately severe patchy ground-glass infiltrates within the lung apices,   consistent with pneumonia in the appropriate clinical setting. Commonly reported imaging features of COVID-19 pneumonia are present.   Other   processes such as influenza pneumonia and organizing pneumonia, as can be   seen with drug toxicity and connective tissue disease, can cause a similar   imaging pattern. Chest X-ray  3/15/21:  There is extensive airspace disease seen centrally within the lungs, which is   obscuring the hilar structures as well as portions of the heart. The cardial   pericardial silhouette is stable. No pneumothorax is seen. No free air. No   acute bony abnormality. Digoxin 3/15/21: 0.5 ng/ml    Hepatic Function Panel:    Lab Results   Component Value Date    ALKPHOS 75 03/15/2021    ALT 40 03/15/2021    AST 68 03/15/2021    PROT 7.7 03/15/2021    BILITOT 1.8 03/15/2021    LABALBU 3.4 03/15/2021     CBC:   Recent Labs     03/15/21  1700   WBC 13.8*   HGB 14.9   HCT 47.0   MCV 85.0        BMP:   Recent Labs     03/15/21  1700      K 3.8   CL 95*   CO2 23   BUN 20   CREATININE 0.5*   GLUCOSE 128*       Physical Exam:   /85   Pulse 122   Temp 98.4 °F (36.9 °C) (Axillary)   Resp 22   Ht 5' (1.524 m)   Wt 136 lb 0.4 oz (61.7 kg)   SpO2 91%   BMI 26.57 kg/m²   General: drowsy but arousable, follows simple commands, garbled speech, in no acute distress  Skin: decreased subcutaneous fat  HEENT: Mucous membranes are mildly dry, sclerae are clear   Neck: carotid upstrokes are diminished without bruit  Chest: bilateral mastectomy  Heart: tachycardic IRRR, S1S2, no murmurs  Lungs:  Equal coarse breath sounds bilaterally, diminished at the bases, without rales, rhonchi   Abdomen: soft, bowel sounds quietly present, no apparent tenderness, nondistended  Extremities:  No mottling or edema  Neurologic: drowsy but arousable, left hemiparesis, no clonus, plantar reflex is equivocal, follows simple commands, left sided neglect, possible decrease vision in left visual field on limited exam      Assessment/Plan:  1. 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 Likely cardioembolic from atrial fibrillation with right MCA occlusion and family declining endovascular intervention. It is unclear this early in course what her recovery would be. Will need to set up family meeting to discuss goals and plans. The patient is Hospice eligible. Thanks, and I will follow from the Hospice physician standpoint. PPS 30%. 2. Cerebrovascular disease with right MCA occlusion and family declining endovascular intervention  3. Atrial fibrillation with rapid ventricular response  4. History of hypertension, breast cancer with bilateral mastectomy, hypothyroidism  5. DNR-comfort care      Patient Active Problem List   Diagnosis Code    Headache R51.9    Acute CVA (cerebrovascular accident) ([address] I63.9    Acute cerebrovascular accident ([address] I63.9    Persistent atrial fibrillation (HCC) I48.19    Acute cerebrovascular accident (CVA) ([facility] [address] I58.6         Certification of Terminal Illness: I certify that this patient is eligible for Hospice services for a terminal diagnosis of cerebrovascular disease and right middle cerebral artery distribution cerebrovascular accident with a life expectancy predicted to be less than 6 months if this illness follows its expected course.       Darian Tierney MD

## 2021-03-17 PROBLEM — G93.40 ENCEPHALOPATHY: Status: ACTIVE | Noted: 2021-01-01

## 2021-03-17 PROBLEM — I67.9 CEREBROVASCULAR DISEASE: Chronic | Status: ACTIVE | Noted: 2021-01-01

## 2021-03-17 PROBLEM — I63.9 ACUTE CVA (CEREBROVASCULAR ACCIDENT) (HCC): Status: RESOLVED | Noted: 2020-01-01 | Resolved: 2021-01-01

## 2021-03-17 PROBLEM — R51.9 HEADACHE: Status: RESOLVED | Noted: 2020-01-01 | Resolved: 2021-01-01

## 2021-03-17 PROBLEM — I63.9 ACUTE CVA (CEREBROVASCULAR ACCIDENT) (HCC): Status: ACTIVE | Noted: 2021-01-01

## 2021-03-17 PROBLEM — I63.9 ACUTE CEREBROVASCULAR ACCIDENT (HCC): Status: RESOLVED | Noted: 2020-01-01 | Resolved: 2021-01-01

## 2021-03-17 PROBLEM — Z51.5 HOSPICE CARE: Status: ACTIVE | Noted: 2021-01-01

## 2021-03-17 NOTE — DISCHARGE SUMMARY
Discharge Summary    Name:  Veronica Abdul /Age/Sex: 1928  (80 y.o. female)   MRN & CSN:  [id number] & [id number] Admission Date/Time: 3/15/2021  3:08 PM   Attending:  No att. providers found Discharging Physician: Kalpesh Ramirez MD     Hospital Course:   Veronica Abdul is a 81 y/o F with PMH of atrial fibrillation, HTN, hypothyroidism, prior strokes in the past, most recently in Oct 2020, who presented with  left upper and lower extremity weakness, right sided facial droop, expressive aphasia and was found to have a large left acute CVA of Rt frontal, parietal lobes and Rt insular cortex. She did not receive TPA because she is on warfarin. Family declined transfer to [address] [place] [address] for thrombectomy. Daughter and other family members requested to make the patient DNR CC with no further treatment. She was ultimately transitioned to inpatient hospice. Chronic Medical Conditions, medications were held due to NPO status. HTN   Atrial Fibrillation   Hypothyroidism     The patient expressed appropriate understanding of and agreement with the discharge recommendations, medications, and plan. Consults this admission:  IP CONSULT TO PHARMACY  PHARMACY TO CHANGE BASE FLUIDS  IP CONSULT TO [address] [place] [address]    Discharge Instruction:   Follow up appointments:  None    Diet:  NPO   Activity: bedrest  Disposition: Discharged to:   [x] Inpatient Hospice. Condition on discharge: Poor    Discharge Medications:     No current facility-administered medications for this encounter. No current outpatient medications on file. Objective Findings at Discharge:   /87   Pulse 136   Temp 97.3 °F (36.3 °C) (Axillary)   Resp 17   Ht 5' (1.524 m)   Wt 136 lb 0.4 oz (61.7 kg)   SpO2 97%   BMI 26.57 kg/m²            PHYSICAL EXAM  GEN    Elderly  female, lying in bed. RESP  Breathing comfortably on room air. CARDIO/VASC            S1/S2 auscultated. Regular rate without appreciable murmurs. No peripheral edema.   GI

## 2021-03-17 NOTE — CARE COORDINATION
Reviewed chart for continued discharge planning. Dr. Brandy Hager has already met c pt's family at bedside and plan for GIP. Referral called to [facility] and Joan Fox is on her way to [facility] to meet c family soon.

## 2021-03-17 NOTE — H&P
[address] [place] [address] H+P    Date: 3/17/2021  Name: Veronica Abdul  MRN: [id number]  YOB: 1928   Patient's PCP: Grant Lugo MD  Acute care admission date: 3/15 to 3/17/2021   Admit to General Inpatient Hospice: 3/17/2021    Informant: Chart reviewed, discussed with the patient's nurses, case management, hospice nurse liaison. I examined the patient who is unable to provide any information due to clinical status. CC: stroke    [place]: This is a 80 y.o. female with history of atrial fibrillation, cerebrovascular disease with ischemic strokes in September and October 2020, hypertension, hypothyroidism, history of breast cancer, who was admitted on 3/15/2021 from home with acute stroke. The patient evidently developed weakness and speech difficulties in the morning of 3/15, and came to the ED at 1505. There is a left hemiparesis. A stroke alert was called, and the patient was not felt to be a candidate for thrombolytics due to time and Warfarin therapy (I do not see an INR resulted). Imaging shows right MCA infarct in right frontoparietal lobe and insula, and old stroke in left occipital lobe and old lacunar infarct on the right. CTA. CTA showed occluded right MCA segment. The patient's granddaughter declined transfer to a tertiary center for additional intervention, and the patient was admitted by the hospitalist. The patient is in atrial fibrillation with rapid ventricular response, and anticoagulation was held. Per ED notes and H+P, the granddaughter wants DNR-comfort care and hospice was consulted. On 3/16/21, the patient opened her eyes to my voice and exam. She has left sided weakness and neglect. Her speech was garbled, but she is able to accomplish simple commands and would squeeze my fingers with her right hand, close her eyes, and is able to left her left leg slightly but there is no movement of the left upper extremity.  On 3/17/21, the patient is much less alert, has some upper airway noise, and is minimally responsive, briefly opening her eyes. The patient is DNR-comfort care status. I met with the patient's granddaughter (Sandi Mario) and her  at the bedside. Additional history is obtained: the patient is fiercely independent and would not want to be like this and dependent for care. She would not want to be in a Nursing Home. She was able to return home after the two prior strokes in the fall 2020 with family support. The patient is right handed. Rekha Reed has financial POA, and Rekha Reed has a twin sister. The patient has an estranged son who is not involved, and is aware of the situation. Hospice philosophy was discussed regarding care and comfort at the end of life. We discussed that [address] is for management of symptoms, and that if the patient stabilizes, alternate arrangements for care will be needed. The patient would not want artificial nutrition and hydration. Questions were answered, and emotional support was provided. Past Medical History:   Diagnosis Date    [facility])     Cerebrovascular disease 3/17/2021    Hypertension     Osteoporosis     TIA (transient ischemic attack)        Past Surgical History:   Procedure Laterality Date    BREAST SURGERY         Social History     Socioeconomic History    Marital status:      Spouse name: Not on file    Number of children: Not on file    Years of education: Not on file    Highest education level: Not on file   Occupational History    Not on file   Social Needs    Financial resource strain: Not on file    Food insecurity     Worry: Not on file     Inability: Not on file    Transportation needs     Medical: Not on file     Non-medical: Not on file   Tobacco Use    Smoking status: Never Smoker    Smokeless tobacco: Never Used    Tobacco comment: 2 cups every morning. Drinks 3 or 4 cups daily.    Substance and Sexual Activity    Alcohol use: No    Drug use: No    Sexual activity: Not on file   Lifestyle    Physical activity     Days per week: Not on file     Minutes per session: Not on file    Stress: Not on file   Relationships    Social connections     Talks on phone: Not on file     Gets together: Not on file     Attends Yazidi service: Not on file     Active member of club or organization: Not on file     Attends meetings of clubs or organizations: Not on file     Relationship status: Not on file    Intimate partner violence     Fear of current or ex partner: Not on file     Emotionally abused: Not on file     Physically abused: Not on file     Forced sexual activity: Not on file   Other Topics Concern    Not on file   Social History Narrative    Not on file       No family history on file. Allergies   Allergen Reactions    Statins      Muscle pain       Medication list reviewed  Prior to Admission medications    Medication Sig Start Date End Date Taking? Authorizing Provider   digoxin (LANOXIN) 125 MCG tablet Take 125 mcg by mouth daily  11/19/20   Historical Provider, MD   metoprolol tartrate (LOPRESSOR) 50 MG tablet Take 1 tablet by mouth 2 times daily 12/23/20   Cholo Clayton MD   warfarin (COUMADIN) 4 MG tablet Take 1 tablet by mouth every evening 10/30/20   Johana Lucero MD   levothyroxine (SYNTHROID) 112 MCG tablet Take 112 mcg by mouth Daily. Historical Provider, MD       ROS: As noted in [address], all other systems are limited due to the patient's clinical condition     The patient does not have capacity for medical decision making. Weight:    Wt Readings from Last 3 Encounters:   03/15/21 136 lb 0.4 oz (61.7 kg)   01/12/21 136 lb (61.7 kg)   12/23/20 139 lb (63 kg)       Data reviewed 3/17/2021:  Transthoracic Echocardiogram 9/22/20:   Left ventricular systolic function is normal.   Ejection fraction is visually estimated at 50-55%. Mild left ventricular hypertrophy. Grade I diastolic dysfunction.    Sclerotic, but non-stenotic aortic valve.   Moderate aortic regurgitation; PHT: 355 msec. Mitral annular calcification is present. Mild to moderate mitral regurgitation. No evidence of any pericardial effusion. CT-scan of the brain 3/15/21:  Acute infarctions in the right frontal parietal lobes, and in the right   insula cortex, in the right MCA territory. Old infarction in the left occipital lobe. Old lacunar infarct in the right suprapatellar angle. Mild parenchymal volume loss. Mild to moderate chronic microvascular disease. CTA head and neck 3/15/21:  Occluded right cervical ICA. Partially occluded distal M1 segment of the right MCA with attenuated flow   within the M2 branches. Moderately severe patchy ground-glass infiltrates within the lung apices,   consistent with pneumonia in the appropriate clinical setting. Commonly reported imaging features of COVID-19 pneumonia are present. Other   processes such as influenza pneumonia and organizing pneumonia, as can be   seen with drug toxicity and connective tissue disease, can cause a similar   imaging pattern. Chest X-ray  3/15/21:  There is extensive airspace disease seen centrally within the lungs, which is   obscuring the hilar structures as well as portions of the heart. The cardial   pericardial silhouette is stable. No pneumothorax is seen. No free air. No   acute bony abnormality. Digoxin 3/15/21: 0.5 ng/ml    Hepatic Function Panel:    Lab Results   Component Value Date    ALKPHOS 75 03/15/2021    ALT 40 03/15/2021    AST 68 03/15/2021    PROT 7.7 03/15/2021    BILITOT 1.8 03/15/2021    LABALBU 3.4 03/15/2021     CBC:   Recent Labs     03/15/21  1700   WBC 13.8*   HGB 14.9   HCT 47.0   MCV 85.0        BMP:   Recent Labs     03/15/21  1700      K 3.8   CL 95*   CO2 23   BUN 20   CREATININE 0.5*   GLUCOSE 128*     Physical Exam:   Blood pressure 114/88, pulse 130, temperature 97.8 °F (36.6 °C), temperature source Axillary, resp.  rate 18, height 5' (1.524 m), weight 136 lb 0.4 oz (61.7 kg), SpO2 95 %. General: minimally responsive, briefly opening her eyes with exam, no attempts at speech this morning, and weakly tried to squeeze my fingers with her right hand  Skin: decreased subcutaneous fat, scattered bruising  HEENT: Mucous membranes are dry, sclerae are clear   Neck: carotid upstrokes are diminished without bruit  Chest: bilateral mastectomy  Heart: tachycardic IRRR, S1S2, no murmurs  Lungs:  Equal coarse breath sounds bilaterally, diminished at the bases, without rales, scattered rhonchi   Abdomen: soft, bowel sounds quietly present, no apparent tenderness, nondistended  Extremities:  No mottling or edema  Neurologic: minimally responsive, left hemiparesis, no clonus, plantar reflex is equivocal, briefly opens her eyes      Assessment/Plan:  1. Right middle cerebral artery distribution cerebrovascular accident with underlying cerebrovascular disease and two prior strokes in Fall 2020. Likely cardioembolic from atrial fibrillation with right MCA occlusion and family declining transfer and endovascular intervention. I met with the patient's granddaughter, Jina Adam, on 3/17/21 and hospice philosophy was discussed. We discussed General Inpatient Hospice for symptom management of pain, congestion, restlessness and possible end of life care, and family is agreeable. I have added comfort medications. PPS 20%. The hospice nurse liaison to met with family for consents. 2. Cerebrovascular disease with right MCA occlusion and family declining endovascular intervention  3. Atrial fibrillation with rapid ventricular response, restart Digoxin, and continue IV Metoprolol  4. History of hypertension, breast cancer with bilateral mastectomy, hypothyroidism  5.  DNR-comfort care      Patient Active Problem List   Diagnosis Code    Persistent atrial fibrillation (HCC) I48.19    Acute cerebrovascular accident (CVA) ([facility] [address] I63.9    Cerebrovascular disease I67.9  Hospice care Z51.5    Encephalopathy G93.40    Hypertension O70         Certification of Terminal Illness: I certify that this patient is eligible for Hospice services for a terminal diagnosis of cerebrovascular disease and right middle cerebral artery distribution cerebrovascular accident with a life expectancy predicted to be less than 6 months if this illness follows its expected course.       Sacha St MD

## 2021-03-17 NOTE — PROGRESS NOTES
[address] [place] [address]  Progress Note    Date: 3/17/2021  Name: Letitia Benito  MRN: [id number]  YOB: 1928   Patient's PCP: Alva Deluna MD   Acute care admission date: 3/15/2021   Admit Date: 3/17/2021 to [facility] (based on consents)    Subjective: The patient is minimally responsive, briefly opening her eyes with my exam. She was restless and painful at times overnight, benefiting from low dose Morphine. She has developed some upper airway noise. She remains tachycardic and unable to safely take oral medications. I collaborated with the patient's nurse and the hospice nurse. I met with the patient's granddaughter Ulysses Sander) and her  at the bedside. Additional history is obtained: the patient is fiercely independent and would not want to be like this and dependent for care. She would not want to be in a Nursing Home. She was able to return home after the two prior strokes in the fall 2020 with family support. The patient is right handed. Dania Martinez has financial POA, and Dania Martinez has a twin sister. The patient has an estranged son who is not involved, and is aware of the situation. Hospice philosophy was discussed regarding care and comfort at the end of life. We discussed that [facility] is for management of symptoms, and that if the patient stabilizes, alternate arrangements for care will be needed. The patient would not want artificial nutrition and hydration. Questions were answered, and emotional support was provided. Objective:   Pain is managed with Morphine IV prn with 3 doses in the past 24 hours. Will make Glycopyrrolate IV available for secretions, and Lorazepam is available for anxiety.       Data reviewed 3/17/2021:  Transthoracic Echocardiogram 9/22/20:   Left ventricular systolic function is normal.   Ejection fraction is visually estimated at 50-55%.   Mild left ventricular hypertrophy.   Grade I diastolic dysfunction.   Sclerotic, but non-stenotic aortic valve.   Moderate aortic regurgitation; PHT: 355 msec.   Mitral annular calcification is present.   Mild to moderate mitral regurgitation.   No evidence of any pericardial effusion.     CT-scan of the brain 3/15/21:  Acute infarctions in the right frontal parietal lobes, and in the right   insula cortex, in the right MCA territory. Old infarction in the left occipital lobe. Old lacunar infarct in the right suprapatellar angle. Mild parenchymal volume loss. Mild to moderate chronic microvascular disease.      CTA head and neck 3/15/21:  Occluded right cervical ICA. Partially occluded distal M1 segment of the right MCA with attenuated flow   within the M2 branches. Moderately severe patchy ground-glass infiltrates within the lung apices,   consistent with pneumonia in the appropriate clinical setting.      Commonly reported imaging features of COVID-19 pneumonia are present.  Other   processes such as influenza pneumonia and organizing pneumonia, as can be   seen with drug toxicity and connective tissue disease, can cause a similar   imaging pattern.      Chest X-ray  3/15/21:  There is extensive airspace disease seen centrally within the lungs, which is   obscuring the hilar structures as well as portions of the heart.  The cardial   pericardial silhouette is stable.  No pneumothorax is seen.  No free air.  No   acute bony abnormality.      Digoxin 3/15/21: 0.5 ng/ml     Hepatic Function Panel:    Lab Results   Component Value Date     ALKPHOS 75 03/15/2021     ALT 40 03/15/2021     AST 68 03/15/2021     PROT 7.7 03/15/2021     BILITOT 1.8 03/15/2021     LABALBU 3.4 03/15/2021      CBC:       Recent Labs     03/15/21  1700   WBC 13.8*   HGB 14.9   HCT 47.0   MCV 85.0         BMP:       Recent Labs     03/15/21  1700      K 3.8   CL 95*   CO2 23   BUN 20   CREATININE 0.5*   GLUCOSE 128*         Physical Exam:   Blood pressure 114/88, pulse 130, temperature 97.8 °F (36.6 °C), temperature source Axillary, resp. rate 18, height 5' (1.524 m), weight 136 lb 0.4 oz (61.7 kg), SpO2 95 %. General: minimally responsive, briefly opening her eyes with exam, no attempts at speech this morning, and weakly tried to squeeze my fingers with her right hand  Skin: decreased subcutaneous fat, scattered bruising  HEENT: Mucous membranes are dry, sclerae are clear   Neck: carotid upstrokes are diminished without bruit  Chest: bilateral mastectomy  Heart: tachycardic IRRR, S1S2, no murmurs  Lungs:  Equal coarse breath sounds bilaterally, diminished at the bases, without rales, scattered rhonchi   Abdomen: soft, bowel sounds quietly present, no apparent tenderness, nondistended  Extremities:  No mottling or edema  Neurologic: minimally responsive, left hemiparesis, no clonus, plantar reflex is equivocal, briefly opens her eyes      Assessment/Plan:  1. 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 Likely cardioembolic from atrial fibrillation with right MCA occlusion and family declining endovascular intervention. I met with the patient's granddaughter, aTtianna Santizo, and hospice philosophy was discussed. We discussed [facility] for symptom management of pain, congestion, restlessness and possible end of life care, and is agreeable. Will add additional comfort medications. PPS 20%. The hospice nurse liaison to meet with family for consents. 2. Cerebrovascular disease with right MCA occlusion and family declining endovascular intervention  3. Atrial fibrillation with rapid ventricular response, Restart Digoxin, and continue IV Metoprolol  4. History of hypertension, breast cancer with bilateral mastectomy, hypothyroidism  5.  DNR-comfort care        Patient Active Problem List   Diagnosis Code    Headache R51.9    Acute CVA (cerebrovascular accident) ([facility] [address] I63.9    Acute cerebrovascular accident ([facility] [address] I63.9    Persistent atrial fibrillation (HCC) I48.19    Acute cerebrovascular accident (CVA) ([facility] [address] I63.9       ABDIEL Marshall MD  3/17/2021

## 2021-03-18 NOTE — PLAN OF CARE
Problem: Skin Integrity:  Goal: Will show no infection signs and symptoms  Description: Will show no infection signs and symptoms  3/18/2021 1156 by Yeny Mojica RN  Outcome: Ongoing  3/18/2021 0146 by Cheryl Sanchez RN  Outcome: Ongoing  Goal: Absence of new skin breakdown  Description: Absence of new skin breakdown  3/18/2021 1156 by Yeny Mojica RN  Outcome: Ongoing  3/18/2021 0146 by Cheryl Sanchez RN  Outcome: Ongoing     Problem: Pain:  Goal: Pain level will decrease  Description: Pain level will decrease  3/18/2021 1156 by Yeny Mojica RN  Outcome: Ongoing  3/18/2021 0146 by Cheryl Sanchez RN  Outcome: Ongoing  Goal: Control of acute pain  Description: Control of acute pain  3/18/2021 1156 by Yeny Mojica RN  Outcome: Ongoing  3/18/2021 0146 by Cheryl Sanchez RN  Outcome: Ongoing  Goal: Control of chronic pain  Description: Control of chronic pain  3/18/2021 1156 by Yeny Mojica RN  Outcome: Ongoing  3/18/2021 0146 by Cheryl Sanchez RN  Outcome: Ongoing

## 2021-03-18 NOTE — PROGRESS NOTES
[address] [place] [address] Inpatient Hospice Progress Note    Date: 3/18/2021  Name: Nataly Jorgensen  MRN: [id number]  YOB: 1928   Patient's PCP: Maris Miranda MD   Acute care admission date: 3/15/2021   Acute care admission date: 3/15 to 3/17/2021   Admit Date: 3/17/2021 to [facility]    Subjective: The patient is unresponsive with sonorous respirations. Her restlessness is better managed. Pain is managed with Morphine. She has upper airway noise benefiting from Glycopyrrolate IV. She is less tachycardic. She is unable to safely take diet or oral medications. I collaborated with the patient's nurse and the hospice nurse. I talked with the patient's granddaughter, Antonio Oleary, at the bedside who is thankful for the care being provided. She relates that the patient told her 2 weeks ago that she was ready to go and wanted it to be on a \"rainy dreary day\" like today. Objective:   Pain is managed with Morphine IV prn with 5 doses in the past 24 hours, Glycopyrrolate IV is available for secretions, and Lorazepam is available for anxiety. Data reviewed 3/18/2021:  Transthoracic Echocardiogram 9/22/20:   Left ventricular systolic function is normal.   Ejection fraction is visually estimated at 50-55%.   Mild left ventricular hypertrophy.   Grade I diastolic dysfunction.   Sclerotic, but non-stenotic aortic valve.   Moderate aortic regurgitation; PHT: 355 msec.   Mitral annular calcification is present.   Mild to moderate mitral regurgitation.   No evidence of any pericardial effusion.     CT-scan of the brain 3/15/21:  Acute infarctions in the right frontal parietal lobes, and in the right   insula cortex, in the right MCA territory. Old infarction in the left occipital lobe. Old lacunar infarct in the right suprapatellar angle. Mild parenchymal volume loss. Mild to moderate chronic microvascular disease.      CTA head and neck 3/15/21:  Occluded right cervical ICA. [>50% of Time Spent on Counseling and Coordination of Care for  ___] : Greater than 50% of the encounter time was spent on counseling and coordination of care for [unfilled] [Time Spent: ___ minutes] : I have spent [unfilled] minutes of face to face time with the patient or edema  Neurologic: unresponsive, left hemiparesis and right upper extremity is weak this morning also, no clonus, plantar reflex is equivocal     Assessment/Plan:  1. 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 Likely cardioembolic from atrial fibrillation with right MCA occlusion and family declining endovascular intervention. Continue [facility] for symptom management of pain, congestion, restlessness and probable end of life care. The patient has declined from the last visit. Continue comfort medications which are effective. PPS 10 %. 2. Cerebrovascular disease with right MCA occlusion and family declining endovascular intervention  3. Atrial fibrillation with rapid ventricular response, less tachycardic with restart Digoxin. Will stop IV Metoprolol  4. History of hypertension, breast cancer with bilateral mastectomy, hypothyroidism  5. DNR-comfort care        Patient Active Problem List   Diagnosis Code    Persistent atrial fibrillation (HCC) I48.19    Acute cerebrovascular accident (CVA) ([facility] [address] I63.9    Cerebrovascular disease I67.9    Hospice care Z51.5    Encephalopathy G93.40    Hypertension I10    Acute CVA (cerebrovascular accident) ([facility] [address] I63.9       ABDIEL Henson MD  3/18/2021

## 2021-03-19 NOTE — PROGRESS NOTES
[address] [place] [address] Inpatient Hospice Progress Note    Date: 3/19/2021  Name: Issac Ortega  MRN: [id number]  YOB: 1928   Patient's PCP: Mimi West MD   Acute care admission date: 3/15/2021   Acute care admission date: 3/15 to 3/17/2021   Admit Date: 3/17/2021 to [facility]    Subjective: The patient remains unresponsive with upper airway noise. Her breathing is more shallow. She has been intermittently restless and has benefited from the use of Morphine and Lorazepam. She has upper airway noise benefiting from Glycopyrrolate IV. She is tachycardic. She is unable to safely take diet or oral medications. I collaborated with the patient's nurse and the hospice nurse. Objective:   Pain is managed with Morphine IV prn with 7 doses in the past 24 hours, Glycopyrrolate IV is available for secretions, and Lorazepam is available for anxiety. Data reviewed 3/19/2021:  Transthoracic Echocardiogram 9/22/20:   Left ventricular systolic function is normal.   Ejection fraction is visually estimated at 50-55%.   Mild left ventricular hypertrophy.   Grade I diastolic dysfunction.   Sclerotic, but non-stenotic aortic valve.   Moderate aortic regurgitation; PHT: 355 msec.   Mitral annular calcification is present.   Mild to moderate mitral regurgitation.   No evidence of any pericardial effusion.     CT-scan of the brain 3/15/21:  Acute infarctions in the right frontal parietal lobes, and in the right   insula cortex, in the right MCA territory. Old infarction in the left occipital lobe. Old lacunar infarct in the right suprapatellar angle. Mild parenchymal volume loss. Mild to moderate chronic microvascular disease.      CTA head and neck 3/15/21:  Occluded right cervical ICA. Partially occluded distal M1 segment of the right MCA with attenuated flow   within the M2 branches.    Moderately severe patchy ground-glass infiltrates within the lung apices,   consistent with pneumonia in the appropriate clinical setting. Commonly reported imaging features of COVID-19 pneumonia are present.  Other   processes such as influenza pneumonia and organizing pneumonia, as can be   seen with drug toxicity and connective tissue disease, can cause a similar   imaging pattern.      Chest X-ray  3/15/21:  There is extensive airspace disease seen centrally within the lungs, which is   obscuring the hilar structures as well as portions of the heart.  The cardial   pericardial silhouette is stable.  No pneumothorax is seen.  No free air.  No   acute bony abnormality.      Digoxin 3/15/21: 0.5 ng/ml     Hepatic Function Panel:    Lab Results   Component Value Date     ALKPHOS 75 03/15/2021     ALT 40 03/15/2021     AST 68 03/15/2021     PROT 7.7 03/15/2021     BILITOT 1.8 03/15/2021     LABALBU 3.4 03/15/2021      CBC:       Recent Labs     03/15/21  1700   WBC 13.8*   HGB 14.9   HCT 47.0   MCV 85.0         BMP:       Recent Labs     03/15/21  1700      K 3.8   CL 95*   CO2 23   BUN 20   CREATININE 0.5*   GLUCOSE 128*         Physical Exam:   Blood pressure 121/67, pulse 136, temperature 101.9 °F (38.8 °C), temperature source Axillary, resp. rate 24, height 5' (1.524 m), weight 136 lb 0.4 oz (61.7 kg), SpO2 (!) 74 %. General: unresponsive, there is upper airway noise, no furrowed brow, open mouth breathing  Skin: decreased subcutaneous fat, scattered bruising  HEENT: Mucous membranes are dry, sclerae are clear   Chest: bilateral mastectomy  Heart: tachycardic IRRR, S1S2, no murmurs  Lungs:  Equal coarse breath sounds bilaterally, diminished at the bases, without rales, scattered rhonchi   Abdomen: soft, bowel sounds quietly present, no apparent tenderness, nondistended  : fiore  Extremities:  No mottling or edema, toes are cooler  Neurologic: unresponsive, left hemiparesis,      Assessment/Plan:  1.  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 Likely cardioembolic from atrial fibrillation with right MCA occlusion and family declining endovascular intervention. Continue [facility] for symptom management of pain, congestion, restlessness and probable end of life care. Will add scheduled Morphine and continue prn. The patient continues to decline daily, and life expectancy is likely hours to days. Continue comfort medications which are effective. PPS 10 %. 2. Cerebrovascular disease with right MCA occlusion and family declining endovascular intervention  3. Atrial fibrillation with rapid ventricular response, less tachycardic with restart Digoxin. Will stop IV Metoprolol  4. History of hypertension, breast cancer with bilateral mastectomy, hypothyroidism  5. DNR-comfort care  6. Dr. Danna Lacey will be covering the [facility] patients for Dr Fernanda Llamas beginning Friday, March 19 at 1700 until Monday, March 22 at 0800. Dr. Bernardino Jauregui can be reached through [address] [place] [address]. Patient Active Problem List   Diagnosis Code    Persistent atrial fibrillation (HCC) I48.19    Acute cerebrovascular accident (CVA) ([facility] [address] I63.9    Cerebrovascular disease I67.9    Hospice care Z51.5    Encephalopathy G93.40    Hypertension I10    Acute CVA (cerebrovascular accident) ([facility] [address] I63.9       ABDIEL Page MD  3/19/2021

## 2021-03-19 NOTE — DISCHARGE SUMMARY
the ED at 1505. There is a left hemiparesis. A stroke alert was called, and the patient was not felt to be a candidate for thrombolytics due to time and Warfarin therapy (I do not see an INR resulted). Imaging shows right MCA infarct in right frontoparietal lobe and insula, and old stroke in left occipital lobe and old lacunar infarct on the right. CTA. CTA showed occluded right MCA segment. The patient's granddaughter declined transfer to a tertiary center for additional intervention, and the patient was admitted by the hospitalist. The patient is in atrial fibrillation with rapid ventricular response, and anticoagulation was held. Per ED notes and H+P, the granddaughter wants DNR-comfort care and hospice was consulted.        On 3/16/21, the patient opened her eyes to my voice and exam. She has left sided weakness and neglect. Her speech was garbled, but she is able to accomplish simple commands and would squeeze my fingers with her right hand, close her eyes, and is able to left her left leg slightly but there is no movement of the left upper extremity. On 3/17/21, the patient is much less alert, has some upper airway noise, and is minimally responsive, briefly opening her eyes. The patient is DNR-comfort care status.       I met with the patient's granddaughter (Cindy, financial POA) and her  at the bedside. Additional history is obtained: the patient is fiercely independent and would not want to be like this and dependent for care. She would not want to be in a Nursing Home. She was able to return home after the two prior strokes in the fall 2020 with family support. The patient is right handed.  Janeen Wilson has financial POA, and Janeen Wilson has a twin sister. The patient has an estranged son who is not involved, and is aware of the situation. Hospice philosophy was discussed regarding care and comfort at the end of life. We discussed that [address] is for management of symptoms, and that if the patient stabilizes, alternate arrangements for care will be needed. The patient would not want artificial nutrition and hydration. Questions were answered, and emotional support was provided. Hospital Course: The patient was admitted to [facility] with the above, for complete details, please see the acute care History and Physical, progress notes, consultant notes and discharge summary. The patient was treated with comfort medications, and symptoms were managed. Emotional and spiritual support was provided to the patient and family. The patient  as noted above.     Cause of death: Right middle cerebral artery distribution cerebrovascular accident with underlying cerebrovascular disease    Significant Diagnostic Studies:  See computerized record in Joshua Ortega MD  3/19/2021